# Patient Record
Sex: FEMALE | Race: WHITE | Employment: OTHER | URBAN - METROPOLITAN AREA
[De-identification: names, ages, dates, MRNs, and addresses within clinical notes are randomized per-mention and may not be internally consistent; named-entity substitution may affect disease eponyms.]

---

## 2017-01-05 PROBLEM — N28.9 RENAL INSUFFICIENCY: Status: ACTIVE | Noted: 2017-01-05

## 2017-01-07 PROBLEM — R10.9 ABDOMINAL PAIN: Status: ACTIVE | Noted: 2017-01-07

## 2018-04-25 ENCOUNTER — HOSPITAL ENCOUNTER (OUTPATIENT)
Dept: GENERAL RADIOLOGY | Age: 83
Discharge: HOME | End: 2018-04-27
Payer: MEDICARE

## 2018-04-25 ENCOUNTER — HOSPITAL ENCOUNTER (OUTPATIENT)
Age: 83
Discharge: HOME | End: 2018-04-27
Payer: MEDICARE

## 2018-04-25 DIAGNOSIS — M54.2 CERVICALGIA: ICD-10-CM

## 2018-04-25 DIAGNOSIS — M54.40 ACUTE RIGHT-SIDED LOW BACK PAIN WITH SCIATICA, SCIATICA LATERALITY UNSPECIFIED: ICD-10-CM

## 2018-04-25 PROCEDURE — 72110 X-RAY EXAM L-2 SPINE 4/>VWS: CPT

## 2018-04-25 PROCEDURE — 72072 X-RAY EXAM THORAC SPINE 3VWS: CPT

## 2019-03-22 ENCOUNTER — HOSPITAL ENCOUNTER (OUTPATIENT)
Dept: MRI IMAGING | Age: 84
Discharge: HOME | End: 2019-03-24
Payer: MEDICARE

## 2019-03-22 DIAGNOSIS — M54.6 PAIN IN THORACIC SPINE: ICD-10-CM

## 2019-03-22 DIAGNOSIS — M54.14 THORACIC RADICULOPATHY: ICD-10-CM

## 2019-03-22 DIAGNOSIS — M19.90 OSTEOARTHRITIS, UNSPECIFIED OSTEOARTHRITIS TYPE, UNSPECIFIED SITE: ICD-10-CM

## 2019-03-22 PROCEDURE — 72146 MRI CHEST SPINE W/O DYE: CPT

## 2019-04-05 ENCOUNTER — INITIAL CONSULT (OUTPATIENT)
Dept: NEUROSURGERY | Age: 84
End: 2019-04-05
Payer: MEDICARE

## 2019-04-05 VITALS
WEIGHT: 99 LBS | HEIGHT: 63 IN | BODY MASS INDEX: 17.54 KG/M2 | HEART RATE: 65 BPM | DIASTOLIC BLOOD PRESSURE: 75 MMHG | SYSTOLIC BLOOD PRESSURE: 136 MMHG

## 2019-04-05 DIAGNOSIS — G89.29 CHRONIC BILATERAL THORACIC BACK PAIN: ICD-10-CM

## 2019-04-05 DIAGNOSIS — M54.6 CHRONIC BILATERAL THORACIC BACK PAIN: ICD-10-CM

## 2019-04-05 PROCEDURE — 1036F TOBACCO NON-USER: CPT | Performed by: PHYSICIAN ASSISTANT

## 2019-04-05 PROCEDURE — 1090F PRES/ABSN URINE INCON ASSESS: CPT | Performed by: PHYSICIAN ASSISTANT

## 2019-04-05 PROCEDURE — 1123F ACP DISCUSS/DSCN MKR DOCD: CPT | Performed by: PHYSICIAN ASSISTANT

## 2019-04-05 PROCEDURE — G8427 DOCREV CUR MEDS BY ELIG CLIN: HCPCS | Performed by: PHYSICIAN ASSISTANT

## 2019-04-05 PROCEDURE — 4040F PNEUMOC VAC/ADMIN/RCVD: CPT | Performed by: PHYSICIAN ASSISTANT

## 2019-04-05 PROCEDURE — G8419 CALC BMI OUT NRM PARAM NOF/U: HCPCS | Performed by: PHYSICIAN ASSISTANT

## 2019-04-05 PROCEDURE — 99213 OFFICE O/P EST LOW 20 MIN: CPT | Performed by: PHYSICIAN ASSISTANT

## 2019-04-05 RX ORDER — FUROSEMIDE 20 MG/1
40 TABLET ORAL 2 TIMES DAILY
Status: ON HOLD | COMMUNITY
Start: 2019-02-08 | End: 2019-08-09 | Stop reason: SDUPTHER

## 2019-04-05 NOTE — PROGRESS NOTES
[address]  NEUROSURGERY     Patient: Rachel Granado  : 1928  MRN: [id number]    Date of Service: 2019    Reason for Referral: Back Pain    History of Present Illness: This is a 80year old white female who presents with chronic thoracic back pain. States the pain is tolerable, 5/10, and bilateral. Hx of T8 and T9 compression fx with kyphoplasty in  with Dr. Eloise Morales, admits to good relief. Only taking Tylenol intermittently for pain, states the pain subsided with rest. Denies new lower extremity pain, weakness, n/t. No loss of bowel or bladder function. Ambulating well with cane. No family present. Allergies:   Pcn [penicillins]    Past Medical History:      Diagnosis Date    Decreased ambulation status     uses cane    Gastric reflux     Hypertension     Left cataract 16    for removal    Parkinson disease ([facility] [address]     tremors of hands/arm / left leg    Vitamin D deficiency        Surgical History:      Procedure Laterality Date    CATARACT REMOVAL WITH IMPLANT Left 2016    HIP FRACTURE SURGERY Right 2015    ORIF left hip inter & subtrochanteric fx. w/long gamma nail. Lester Muniz MD    HYSTERECTOMY  ? robotic    KYPHOSIS SURGERY  2017    T8    KYPHOSIS SURGERY      TONSILLECTOMY      TOTAL KNEE ARTHROPLASTY Right 1997    Right TKA. Philip King. Jennifer Puente MD       Social History:   reports that she has never smoked. She has never used smokeless tobacco.   reports that she does not drink alcohol. Family History:  No family history on file.     Review of Systems:  Denies fever, chills, or night sweats  Denies headache, dizziness, syncope  Denies blurred vision, double vision  Denies chest pain, palpitations, SOB  Denies diarrhea, constipation, n/v  Denies dysuria, hematuria  Denies recent infections  Denies easy bruising  Denies anxiety, depression    Physical Exam:  WDWN, resting comfortable, no apparent distress  Appears stated age  Vitals stable  Non-labored breathing   A&O x 3, normal affect   Head is normocephalic, atraumatic   No palpable lymphadenopathy   Abdomen soft, nontender  Left pupil surgical   EOMI bilaterally  Cranial nerves II-XII intact bilaterally  No drift  4/5 in BUE, LUE tremor noted  4/5 in BLE  Sensation to LT intact x 4 ext  Toes going down  Skin warm and dry  -midline tenderness to palpation in thoracic or lumbar spine     Review of Imaging:  MRI Thoracic Spine   Impression   No evidence of new thoracic compression fracture. Prior kyphoplasties   as noted. Thyroid goiter. Severely dilated esophagus of indeterminate   etiology. Additional observations as outlined above. Assessment: Patient with chronic thoracic back pain, h/o T8 and T9 kyphoplasty. Stable. Plan:  -MRI reviewed, there is no surgical lesion.  No compression fx identified  -Pain control and expectations discussed   -Conservative tx; OTC medications, creams, ice/heat  -HHPT  -RTC PRN

## 2019-04-05 NOTE — PATIENT INSTRUCTIONS

## 2019-04-05 NOTE — LETTER
 KYPHOSIS SURGERY  01/11/2017    T8    KYPHOSIS SURGERY      TONSILLECTOMY      TOTAL KNEE ARTHROPLASTY Right 01/16/1997    Right TKA. Therman More. Aris Gosselin, MD       Social History:   reports that she has never smoked. She has never used smokeless tobacco.   reports that she does not drink alcohol. Family History:  No family history on file. Review of Systems:  Denies fever, chills, or night sweats  Denies headache, dizziness, syncope  Denies blurred vision, double vision  Denies chest pain, palpitations, SOB  Denies diarrhea, constipation, n/v  Denies dysuria, hematuria  Denies recent infections  Denies easy bruising  Denies anxiety, depression    Physical Exam:  WDWN, resting comfortable, no apparent distress  Appears stated age  Vitals stable  Non-labored breathing   A&O x 3, normal affect   Head is normocephalic, atraumatic   No palpable lymphadenopathy   Abdomen soft, nontender  Left pupil surgical   EOMI bilaterally  Cranial nerves II-XII intact bilaterally  No drift  4/5 in BUE, LUE tremor noted  4/5 in BLE  Sensation to LT intact x 4 ext  Toes going down  Skin warm and dry  -midline tenderness to palpation in thoracic or lumbar spine     Review of Imaging:  MRI Thoracic Spine   Impression   No evidence of new thoracic compression fracture. Prior kyphoplasties   as noted. Thyroid goiter. Severely dilated esophagus of indeterminate   etiology. Additional observations as outlined above. Assessment: Patient with chronic thoracic back pain, h/o T8 and T9 kyphoplasty. Stable. Plan:  -MRI reviewed, there is no surgical lesion. No compression fx identified  -Pain control and expectations discussed   -Conservative tx; OTC medications, creams, ice/heat  -HHPT  -RTC PRN                 If you have questions, please do not hesitate to call me. I look forward to following Balta Rasmussen along with you.     Sincerely,        NICOLE Camarena

## 2019-05-31 ENCOUNTER — HOSPITAL ENCOUNTER (EMERGENCY)
Age: 84
Discharge: HOME | End: 2019-05-31
Payer: MEDICARE

## 2019-05-31 ENCOUNTER — APPOINTMENT (OUTPATIENT)
Dept: CT IMAGING | Age: 84
End: 2019-05-31
Payer: MEDICARE

## 2019-05-31 VITALS
OXYGEN SATURATION: 93 % | DIASTOLIC BLOOD PRESSURE: 55 MMHG | HEIGHT: 64 IN | RESPIRATION RATE: 14 BRPM | HEART RATE: 84 BPM | SYSTOLIC BLOOD PRESSURE: 145 MMHG | TEMPERATURE: 97.8 F | WEIGHT: 100 LBS | BODY MASS INDEX: 17.07 KG/M2

## 2019-05-31 DIAGNOSIS — S39.012A LUMBOSACRAL STRAIN, INITIAL ENCOUNTER: Primary | ICD-10-CM

## 2019-05-31 DIAGNOSIS — S29.012A UPPER BACK STRAIN, INITIAL ENCOUNTER: ICD-10-CM

## 2019-05-31 PROCEDURE — 72131 CT LUMBAR SPINE W/O DYE: CPT

## 2019-05-31 PROCEDURE — 99283 EMERGENCY DEPT VISIT LOW MDM: CPT

## 2019-05-31 PROCEDURE — 6370000000 HC RX 637 (ALT 250 FOR IP): Performed by: NURSE PRACTITIONER

## 2019-05-31 PROCEDURE — 71250 CT THORAX DX C-: CPT

## 2019-05-31 RX ORDER — ACETAMINOPHEN 325 MG/1
650 TABLET ORAL ONCE
Status: COMPLETED | OUTPATIENT
Start: 2019-05-31 | End: 2019-05-31

## 2019-05-31 RX ORDER — ACETAMINOPHEN 500 MG
1000 TABLET ORAL EVERY 6 HOURS PRN
Qty: 20 TABLET | Refills: 0 | Status: ON HOLD | OUTPATIENT
Start: 2019-05-31 | End: 2019-08-09 | Stop reason: SDUPTHER

## 2019-05-31 RX ADMIN — ACETAMINOPHEN 650 MG: 325 TABLET ORAL at 13:32

## 2019-05-31 ASSESSMENT — PAIN DESCRIPTION - DESCRIPTORS: DESCRIPTORS: DISCOMFORT;CONSTANT

## 2019-05-31 ASSESSMENT — PAIN - FUNCTIONAL ASSESSMENT: PAIN_FUNCTIONAL_ASSESSMENT: PREVENTS OR INTERFERES WITH ALL ACTIVE AND SOME PASSIVE ACTIVITIES

## 2019-05-31 ASSESSMENT — PAIN SCALES - GENERAL
PAINLEVEL_OUTOF10: 7
PAINLEVEL_OUTOF10: 7

## 2019-05-31 ASSESSMENT — PAIN DESCRIPTION - ORIENTATION: ORIENTATION: UPPER

## 2019-05-31 ASSESSMENT — PAIN DESCRIPTION - FREQUENCY: FREQUENCY: CONTINUOUS

## 2019-05-31 ASSESSMENT — PAIN DESCRIPTION - PROGRESSION: CLINICAL_PROGRESSION: NOT CHANGED

## 2019-05-31 ASSESSMENT — PAIN DESCRIPTION - PAIN TYPE: TYPE: ACUTE PAIN

## 2019-05-31 ASSESSMENT — PAIN DESCRIPTION - LOCATION: LOCATION: BACK

## 2019-05-31 NOTE — ED NOTES
Bed:  [place] [address]  Expected date:   Expected time:   Means of arrival:   Comments:  [place]  05/31/19 10[facility]

## 2019-05-31 NOTE — ED PROVIDER NOTES
[facility]     Department of Emergency Medicine   ED  Provider Note  Admit Date/RoomTime: 5/31/2019 12:18 PM  ED Room: [place] [address]  Chief Complaint:   Back Pain (felt something \"pop\" in her upper back when she was inserting a screen in her window)    History of Present Illness   Source of history provided by:  patient. History/Exam Limitations: none. Rachel Granado is a 80 y.o. old female who has a past medical history of:   Past Medical History:   Diagnosis Date    Decreased ambulation status     uses cane    Gastric reflux     Hypertension     Left cataract 8-11-16    for removal    Parkinson disease ([facility] [address]     tremors of hands/arm / left leg    Vitamin D deficiency     presents to the emergency department by private vehicle, for acute, aching and cramping bilateral middle thoracic spine  And mid to lower lumbar pain without radiation, for several hour(s) prior to arrival.  The pain was caused by patient states that she was lifting a screen into a window when she felt a pop in her back. She has a history of recurrent self limited episodes of low back pain in the past, previous osteoarthritis of lumbar spine and previous herniated disc. Since onset the symptoms have been intermittent and mild in severity. There has been no bowel or bladder incontinence associated with the pain. There have been associated symptoms of nothing additional and denies any weakness, numbness, incontinence, dysuria, hematuria or abdominal pain. The the pain is aggraveated by any movement and relieved by nothing. Kathryn ODOM   Pertinent positives and negatives are stated within HPI, all other systems reviewed and are negative. Past Surgical History:   Procedure Laterality Date    CATARACT REMOVAL WITH IMPLANT Left 12/06/2016    HIP FRACTURE SURGERY Right 11/16/2015    ORIF left hip inter & subtrochanteric fx. w/long gamma nail. Lester Muniz MD    HYSTERECTOMY  2011?     robotic    KYPHOSIS SURGERY 01/11/2017    T8    KYPHOSIS SURGERY      TONSILLECTOMY      TOTAL KNEE ARTHROPLASTY Right 01/16/1997    Right TKA. Mikaelniranjan Jack. Vasile Lindsey MD   Social History:  reports that she has never smoked. She has never used smokeless tobacco. She reports that she does not drink alcohol or use drugs. Family History: family history is not on file. Allergies: Pcn [penicillins]    Physical Exam           ED Triage Vitals [05/31/19 1223]   BP Temp Temp Source Pulse Resp SpO2 Height Weight   (!) 145/55 97.8 °F (36.6 °C) Oral 84 14 93 % 5' 4\" (1.626 m) 100 lb (45.4 kg)      Oxygen Saturation Interpretation: Normal.    Constitutional:  Alert, development consistent with age. HEENT:  NC/NT. Airway patent. Neck:  Normal ROM. Supple. Respiratory:  Clear to auscultation and breath sounds equal.  CV:  Regular rate and rhythm, normal heart sounds, without pathological murmurs, ectopy, gallops, or rubs. GI:  Abdomen Soft, nontender, good bowel sounds. No firm or pulsatile mass. Back: middle and lower thoracic spine and lumbar spine bilateral.             Tenderness: Mild. Swelling: no.              Range of Motion: full range with pain. CVA Tenderness: No.            Straight leg raising:  Bilateral negative. Skin:  no erythema, rash or swelling noted. Distal Function:              Motor deficit: none. Sensory deficit: none. Pulse deficit: none. Calf Tenderness:  No Bilateral.               Edema:  none Both lower extremity(s). Reflexes: Bilateral knee,ankle,biceps normal.  Gait:  normal.  Integument:  Normal turgor. Warm, dry, without visible rash. Lymphatics: No lymphangitis or adenopathy noted. Neurological:  Oriented. Motor functions intact. Lab / Imaging Results   (All laboratory and radiology results have been personally reviewed by myself)  Labs:  No results found for this visit on 05/31/19. Imaging:   All Radiology results encounter      Plan   Discharge to home  Patient condition is good    New Medications     New Prescriptions    ACETAMINOPHEN (APAP EXTRA STRENGTH) 500 MG TABLET    Take 2 tablets by mouth every 6 hours as needed for Pain     Electronically signed by JERMAN Diaz CNP   DD: 5/31/19  **This report was transcribed using voice recognition software. Every effort was made to ensure accuracy; however, inadvertent computerized transcription errors may be present.   END OF ED PROVIDER NOTE      JERMAN Diaz CNP  05/31/19 5167

## 2019-05-31 NOTE — ED NOTES
RN applied ice to patients back where she is complaining of pain.      Sherren Levering, RN  05/31/19 5554

## 2019-06-06 ENCOUNTER — HOSPITAL ENCOUNTER (INPATIENT)
Age: 84
LOS: 5 days | Discharge: NURSING, REHAB OR LONG-TERM CARE | DRG: 478 | End: 2019-06-11
Attending: INTERNAL MEDICINE | Admitting: INTERNAL MEDICINE
Payer: MEDICARE

## 2019-06-06 ENCOUNTER — HOSPITAL ENCOUNTER (OUTPATIENT)
Age: 84
Discharge: HOME | End: 2019-06-06
Payer: MEDICARE

## 2019-06-06 ENCOUNTER — HOSPITAL ENCOUNTER (EMERGENCY)
Age: 84
Discharge: ANOTHER HOSPITAL | End: 2019-06-06
Attending: EMERGENCY MEDICINE
Payer: MEDICARE

## 2019-06-06 ENCOUNTER — APPOINTMENT (OUTPATIENT)
Dept: CT IMAGING | Age: 84
End: 2019-06-06
Payer: MEDICARE

## 2019-06-06 VITALS
HEIGHT: 64 IN | TEMPERATURE: 97.7 F | RESPIRATION RATE: 16 BRPM | BODY MASS INDEX: 17.07 KG/M2 | DIASTOLIC BLOOD PRESSURE: 87 MMHG | SYSTOLIC BLOOD PRESSURE: 171 MMHG | HEART RATE: 79 BPM | WEIGHT: 100 LBS | OXYGEN SATURATION: 96 %

## 2019-06-06 DIAGNOSIS — S22.000A THORACIC COMPRESSION FRACTURE, CLOSED, INITIAL ENCOUNTER (HCC): Primary | ICD-10-CM

## 2019-06-06 DIAGNOSIS — M54.5 LOW BACK PAIN, UNSPECIFIED BACK PAIN LATERALITY, UNSPECIFIED CHRONICITY, WITH SCIATICA PRESENCE UNSPECIFIED: ICD-10-CM

## 2019-06-06 DIAGNOSIS — S22.080A COMPRESSION FRACTURE OF T12 VERTEBRA (HCC): Primary | ICD-10-CM

## 2019-06-06 DIAGNOSIS — R26.2 UNABLE TO AMBULATE: ICD-10-CM

## 2019-06-06 LAB
ANION GAP SERPL CALCULATED.3IONS-SCNC: 12 MMOL/L (ref 7–16)
APTT: 24.2 SEC (ref 24.5–35.1)
BACTERIA: ABNORMAL /HPF
BASOPHILS ABSOLUTE: 0.02 E9/L (ref 0–0.2)
BASOPHILS RELATIVE PERCENT: 0.2 % (ref 0–2)
BILIRUBIN URINE: NEGATIVE
BLOOD, URINE: ABNORMAL
BUN BLDV-MCNC: 31 MG/DL (ref 8–23)
C-REACTIVE PROTEIN: 1.5 MG/DL (ref 0–0.4)
CALCIUM SERPL-MCNC: 9.5 MG/DL (ref 8.6–10.2)
CHLORIDE BLD-SCNC: 96 MMOL/L (ref 98–107)
CLARITY: CLEAR
CO2: 31 MMOL/L (ref 22–29)
COLOR: YELLOW
CREAT SERPL-MCNC: 0.9 MG/DL (ref 0.5–1)
EOSINOPHILS ABSOLUTE: 0 E9/L (ref 0.05–0.5)
EOSINOPHILS RELATIVE PERCENT: 0 % (ref 0–6)
EPITHELIAL CELLS, UA: ABNORMAL /HPF
GFR AFRICAN AMERICAN: >60
GFR NON-AFRICAN AMERICAN: 59 ML/MIN/1.73
GLUCOSE BLD-MCNC: 130 MG/DL (ref 74–99)
GLUCOSE URINE: NEGATIVE MG/DL
HCT VFR BLD CALC: 42.9 % (ref 34–48)
HEMOGLOBIN: 13.7 G/DL (ref 11.5–15.5)
IMMATURE GRANULOCYTES #: 0.1 E9/L
IMMATURE GRANULOCYTES %: 0.8 % (ref 0–5)
KETONES, URINE: ABNORMAL MG/DL
LACTIC ACID: 1.4 MMOL/L (ref 0.5–2.2)
LEUKOCYTE ESTERASE, URINE: NEGATIVE
LYMPHOCYTES ABSOLUTE: 0.59 E9/L (ref 1.5–4)
LYMPHOCYTES RELATIVE PERCENT: 4.5 % (ref 20–42)
MCH RBC QN AUTO: 30.6 PG (ref 26–35)
MCHC RBC AUTO-ENTMCNC: 31.9 % (ref 32–34.5)
MCV RBC AUTO: 96 FL (ref 80–99.9)
MONOCYTES ABSOLUTE: 0.39 E9/L (ref 0.1–0.95)
MONOCYTES RELATIVE PERCENT: 3 % (ref 2–12)
NEUTROPHILS ABSOLUTE: 11.96 E9/L (ref 1.8–7.3)
NEUTROPHILS RELATIVE PERCENT: 91.5 % (ref 43–80)
NITRITE, URINE: NEGATIVE
PDW BLD-RTO: 13.8 FL (ref 11.5–15)
PH UA: 5.5 (ref 5–9)
PLATELET # BLD: 201 E9/L (ref 130–450)
PMV BLD AUTO: 11.7 FL (ref 7–12)
POTASSIUM SERPL-SCNC: 4.1 MMOL/L (ref 3.5–5)
PROTEIN UA: ABNORMAL MG/DL
RBC # BLD: 4.47 E12/L (ref 3.5–5.5)
RBC # BLD: NORMAL 10*6/UL
RBC UA: ABNORMAL /HPF (ref 0–2)
SEDIMENTATION RATE, ERYTHROCYTE: 7 MM/HR (ref 0–20)
SODIUM BLD-SCNC: 139 MMOL/L (ref 132–146)
SPECIFIC GRAVITY UA: 1.01 (ref 1–1.03)
UROBILINOGEN, URINE: 0.2 E.U./DL
WBC # BLD: 13.1 E9/L (ref 4.5–11.5)
WBC UA: ABNORMAL /HPF (ref 0–5)

## 2019-06-06 PROCEDURE — 72128 CT CHEST SPINE W/O DYE: CPT

## 2019-06-06 PROCEDURE — 1200000000 HC SEMI PRIVATE

## 2019-06-06 PROCEDURE — 85025 COMPLETE CBC W/AUTO DIFF WBC: CPT

## 2019-06-06 PROCEDURE — 85730 THROMBOPLASTIN TIME PARTIAL: CPT

## 2019-06-06 PROCEDURE — 80048 BASIC METABOLIC PNL TOTAL CA: CPT

## 2019-06-06 PROCEDURE — A0425 GROUND MILEAGE: HCPCS

## 2019-06-06 PROCEDURE — 87040 BLOOD CULTURE FOR BACTERIA: CPT

## 2019-06-06 PROCEDURE — 81001 URINALYSIS AUTO W/SCOPE: CPT

## 2019-06-06 PROCEDURE — 83605 ASSAY OF LACTIC ACID: CPT

## 2019-06-06 PROCEDURE — 85651 RBC SED RATE NONAUTOMATED: CPT

## 2019-06-06 PROCEDURE — 99284 EMERGENCY DEPT VISIT MOD MDM: CPT

## 2019-06-06 PROCEDURE — 6370000000 HC RX 637 (ALT 250 FOR IP): Performed by: INTERNAL MEDICINE

## 2019-06-06 PROCEDURE — 86140 C-REACTIVE PROTEIN: CPT

## 2019-06-06 PROCEDURE — 6370000000 HC RX 637 (ALT 250 FOR IP): Performed by: NURSE PRACTITIONER

## 2019-06-06 PROCEDURE — A0428 BLS: HCPCS

## 2019-06-06 PROCEDURE — 2580000003 HC RX 258: Performed by: INTERNAL MEDICINE

## 2019-06-06 RX ORDER — GLYCERIN/PROPYLENE GLYCOL 0.6 %-0.6%
1 DROPPERETTE, SINGLE-USE DROP DISPENSER OPHTHALMIC (EYE) EVERY MORNING
COMMUNITY
End: 2020-09-21 | Stop reason: SDUPTHER

## 2019-06-06 RX ORDER — HYDROCODONE BITARTRATE AND ACETAMINOPHEN 5; 325 MG/1; MG/1
1 TABLET ORAL ONCE
Status: COMPLETED | OUTPATIENT
Start: 2019-06-06 | End: 2019-06-06

## 2019-06-06 RX ORDER — LACTOSE-REDUCED FOOD 0.06 G-1.5
1 LIQUID (ML) ORAL EVERY MORNING
Status: ON HOLD | COMMUNITY
End: 2019-06-11 | Stop reason: HOSPADM

## 2019-06-06 RX ORDER — GLYCERIN/PROPYLENE GLYCOL 0.6 %-0.6%
1 DROPPERETTE, SINGLE-USE DROP DISPENSER OPHTHALMIC (EYE) 3 TIMES DAILY
COMMUNITY

## 2019-06-06 RX ORDER — BISACODYL 10 MG
10 SUPPOSITORY, RECTAL RECTAL DAILY PRN
Status: DISCONTINUED | OUTPATIENT
Start: 2019-06-06 | End: 2019-06-11 | Stop reason: HOSPADM

## 2019-06-06 RX ORDER — ONDANSETRON 2 MG/ML
4 INJECTION INTRAMUSCULAR; INTRAVENOUS EVERY 6 HOURS PRN
Status: DISCONTINUED | OUTPATIENT
Start: 2019-06-06 | End: 2019-06-10 | Stop reason: SDUPTHER

## 2019-06-06 RX ORDER — LACTOSE-REDUCED FOOD 0.06 G-1.5
1 LIQUID (ML) ORAL EVERY MORNING
Status: DISCONTINUED | OUTPATIENT
Start: 2019-06-07 | End: 2019-06-06 | Stop reason: CLARIF

## 2019-06-06 RX ORDER — BISACODYL 10 MG
10 SUPPOSITORY, RECTAL RECTAL DAILY PRN
COMMUNITY
End: 2022-01-01

## 2019-06-06 RX ORDER — SODIUM CHLORIDE 0.9 % (FLUSH) 0.9 %
10 SYRINGE (ML) INJECTION EVERY 12 HOURS SCHEDULED
Status: DISCONTINUED | OUTPATIENT
Start: 2019-06-06 | End: 2019-06-10

## 2019-06-06 RX ORDER — ACETAMINOPHEN 500 MG
1000 TABLET ORAL EVERY 6 HOURS PRN
Status: DISCONTINUED | OUTPATIENT
Start: 2019-06-06 | End: 2019-06-11 | Stop reason: HOSPADM

## 2019-06-06 RX ORDER — FUROSEMIDE 40 MG/1
40 TABLET ORAL 2 TIMES DAILY
Status: DISCONTINUED | OUTPATIENT
Start: 2019-06-06 | End: 2019-06-11 | Stop reason: HOSPADM

## 2019-06-06 RX ORDER — SODIUM CHLORIDE 0.9 % (FLUSH) 0.9 %
10 SYRINGE (ML) INJECTION PRN
Status: DISCONTINUED | OUTPATIENT
Start: 2019-06-06 | End: 2019-06-10

## 2019-06-06 RX ORDER — ASPIRIN 81 MG/1
81 TABLET ORAL NIGHTLY
Status: DISCONTINUED | OUTPATIENT
Start: 2019-06-06 | End: 2019-06-09

## 2019-06-06 RX ORDER — POLYVINYL ALCOHOL 14 MG/ML
1 SOLUTION/ DROPS OPHTHALMIC EVERY MORNING
Status: DISCONTINUED | OUTPATIENT
Start: 2019-06-07 | End: 2019-06-07 | Stop reason: SDUPTHER

## 2019-06-06 RX ORDER — POLYVINYL ALCOHOL 14 MG/ML
1 SOLUTION/ DROPS OPHTHALMIC 3 TIMES DAILY
Status: DISCONTINUED | OUTPATIENT
Start: 2019-06-06 | End: 2019-06-11 | Stop reason: HOSPADM

## 2019-06-06 RX ADMIN — ASPIRIN 81 MG: 81 TABLET ORAL at 22:10

## 2019-06-06 RX ADMIN — ACETAMINOPHEN 1000 MG: 500 TABLET ORAL at 22:10

## 2019-06-06 RX ADMIN — CARBIDOPA AND LEVODOPA 1 TABLET: 25; 100 TABLET ORAL at 22:10

## 2019-06-06 RX ADMIN — POLYVINYL ALCOHOL 1 DROP: 14 SOLUTION/ DROPS OPHTHALMIC at 22:09

## 2019-06-06 RX ADMIN — HYDROCODONE BITARTRATE AND ACETAMINOPHEN 1 TABLET: 5; 325 TABLET ORAL at 16:40

## 2019-06-06 RX ADMIN — Medication 10 ML: at 22:13

## 2019-06-06 RX ADMIN — HYDROCODONE BITARTRATE AND ACETAMINOPHEN 1 TABLET: 5; 325 TABLET ORAL at 10:51

## 2019-06-06 ASSESSMENT — PAIN SCALES - GENERAL
PAINLEVEL_OUTOF10: 8
PAINLEVEL_OUTOF10: 6
PAINLEVEL_OUTOF10: 8
PAINLEVEL_OUTOF10: 7
PAINLEVEL_OUTOF10: 5
PAINLEVEL_OUTOF10: 8

## 2019-06-06 ASSESSMENT — PAIN DESCRIPTION - PAIN TYPE
TYPE: ACUTE PAIN
TYPE: CHRONIC PAIN
TYPE: ACUTE PAIN

## 2019-06-06 ASSESSMENT — PAIN DESCRIPTION - ORIENTATION: ORIENTATION: MID

## 2019-06-06 ASSESSMENT — PAIN DESCRIPTION - LOCATION
LOCATION: BACK

## 2019-06-06 ASSESSMENT — PAIN DESCRIPTION - DESCRIPTORS
DESCRIPTORS: CONSTANT
DESCRIPTORS: ACHING;DISCOMFORT
DESCRIPTORS: ACHING;BURNING;STABBING

## 2019-06-06 ASSESSMENT — PAIN DESCRIPTION - FREQUENCY: FREQUENCY: CONTINUOUS

## 2019-06-06 ASSESSMENT — PAIN - FUNCTIONAL ASSESSMENT: PAIN_FUNCTIONAL_ASSESSMENT: PREVENTS OR INTERFERES SOME ACTIVE ACTIVITIES AND ADLS

## 2019-06-06 ASSESSMENT — PAIN DESCRIPTION - PROGRESSION: CLINICAL_PROGRESSION: NOT CHANGED

## 2019-06-06 ASSESSMENT — PAIN DESCRIPTION - ONSET: ONSET: ON-GOING

## 2019-06-06 NOTE — ED PROVIDER NOTES
ATTENDING PROVIDER ATTESTATION:     Patel Berg presented to the emergency department for evaluation of Back Pain (mid back)    I have reviewed and discussed the case, including pertinent history (medical, surgical, family and social) and exam findings with the Midlevel and the Nurse assigned to Patel Berg. I have personally performed and/or participated in the history, exam, medical decision making, and procedures and agree with all pertinent clinical information. Please note the original note was cosigned in error by Dr. Radha Parker. I have reviewed my findings and recommendations with Patel Berg and members of family present at the time of disposition. My findings/plan: The primary encounter diagnosis was Compression fracture of T12 vertebra ([address] A diagnosis of Unable to ambulate was also pertinent to this visit. Discharge Medication List as of 6/6/2019  5:09 PM        Jose Marley MD      ED Attending  CC: Lisa         Department of Emergency Medicine   ED  Provider Note  Admit Date/RoomTime: 6/6/2019  9:44 AM  ED Room: 26/26  Chief Complaint:   Back Pain (mid back)    History of Present Illness   Source of history provided by:  patient. History/Exam Limitations: none. Patel Berg is a 80 y.o. old female who has a past medical history of:   Past Medical History:   Diagnosis Date    Decreased ambulation status     uses cane    Gastric reflux     Hypertension     Left cataract 8-11-16    for removal    Parkinson disease ([facility] [address]     tremors of hands/arm / left leg    Vitamin D deficiency     presents to the emergency department by ambulance where the patient received see Ambulance Run Sheet prior to arrival., for acute on chronic back pain and urinary frequency.  Patient reports she was in the ED last Friday after attempting to press a screen in her window she felt like something exploded in her back and had a CT of the back and states her previous kyphoplastys by Dr. Heydi Galdamez in 2017 were good and no fractures [facility] [address] [facility] [address]. She report she can not get out of bed and Dr. Hawa Zraate put her on a medrol dose pack and she still has 2 days left and has no improvement of pain. Since onset the symptoms have been constant and stable and moderate in severity. There has been no bowel or bladder incontinence associated with the pain. There have been associated symptoms of nothing additional and denies any weakness, numbness, incontinence, dysuria, hematuria, abdominal pain, fever, hx cancer, weight loss, tingling, morning stiffness, leg pain, leg weakness, abdominal swelling, chest pain, pelvic pain or perianal numbness. The the pain is aggraveated by any movement and relieved by nothing. ROS   Pertinent positives and negatives are stated within HPI, all other systems reviewed and are negative. Past Surgical History:   Procedure Laterality Date    CATARACT REMOVAL WITH IMPLANT Left 12/06/2016    HIP FRACTURE SURGERY Right 11/16/2015    ORIF left hip inter & subtrochanteric fx. w/long gamma nail. Anatoliy Gordillo MD    HYSTERECTOMY  2011? robotic    KYPHOSIS SURGERY  01/11/2017    T8    KYPHOSIS SURGERY      KYPHOSIS SURGERY N/A 6/10/2019    T11 VERTEBRAL BODY BIOPSY & KYPHOPLASTY performed by Yuan Das MD at [facility] Right 01/16/1997    Right TKA. Michael Rosario. Dallas Stokes MD   Social History:  reports that she has never smoked. She has never used smokeless tobacco. She reports that she does not drink alcohol or use drugs. Family History: family history is not on file. Allergies: Pcn [penicillins]    Physical Exam           ED Triage Vitals [06/06/19 0959]   BP Temp Temp Source Pulse Resp SpO2 Height Weight   (!) 168/79 97.7 °F (36.5 °C) Oral 85 18 96 % 5' 4\" (1.626 m) 100 lb (45.4 kg)      Oxygen Saturation Interpretation: Normal.    Constitutional:  Alert, development consistent with age.   HEENT: NC/NT. Airway patent. Neck:  Normal ROM. Supple. Respiratory:  Clear to auscultation and breath sounds equal.  CV:  Regular rate and rhythm, normal heart sounds, without pathological murmurs, ectopy, gallops, or rubs. GI:  Abdomen Soft, nontender, good bowel sounds. No firm or pulsatile mass. Back: middle thoracic spine bilateral.             Tenderness: Moderate to midline thoracic spine. Swelling: no.              Range of Motion: diminished range with pain. CVA Tenderness: No.            Straight leg raising:  Bilateral negative. Skin:  no erythema, rash or swelling noted. Distal Function:              Motor deficit: limited due to pain. Sensory deficit: none. Pulse deficit: none. Calf Tenderness:  No Bilateral.               Edema:  none Both lower extremity(s). Reflexes: Bilateral knee,ankle,biceps normal.  Gait:  On cart. Integument:  Normal turgor. Warm, dry, without visible rash. Lymphatics: No lymphangitis or adenopathy noted. Neurological:  Oriented. Motor functions intact.     Lab / Imaging Results   (All laboratory and radiology results have been personally reviewed by myself)  Labs:  Results for orders placed or performed during the hospital encounter of 06/06/19   Culture Blood #1   Result Value Ref Range    Blood Culture, Routine 5 Days- no growth    Culture Blood #2   Result Value Ref Range    Culture, Blood 2 5 Days- no growth    Urinalysis   Result Value Ref Range    Color, UA Yellow Straw/Yellow    Clarity, UA Clear Clear    Glucose, Ur Negative Negative mg/dL    Bilirubin Urine Negative Negative    Ketones, Urine TRACE (A) Negative mg/dL    Specific Gravity, UA 1.010 1.005 - 1.030    Blood, Urine TRACE-INTACT Negative    pH, UA 5.5 5.0 - 9.0    Protein, UA TRACE Negative mg/dL    Urobilinogen, Urine 0.2 <2.0 E.U./dL    Nitrite, Urine Negative Negative    Leukocyte Esterase, Urine Negative Negative Microscopic Urinalysis   Result Value Ref Range    WBC, UA 0-1 0 - 5 /HPF    RBC, UA 0-1 0 - 2 /HPF    Epi Cells RARE /HPF    Bacteria, UA RARE (A) /HPF   CBC Auto Differential   Result Value Ref Range    WBC 13.1 (H) 4.5 - 11.5 E9/L    RBC 4.47 3.50 - 5.50 E12/L    Hemoglobin 13.7 11.5 - 15.5 g/dL    Hematocrit 42.9 34.0 - 48.0 %    MCV 96.0 80.0 - 99.9 fL    MCH 30.6 26.0 - 35.0 pg    MCHC 31.9 (L) 32.0 - 34.5 %    RDW 13.8 11.5 - 15.0 fL    Platelets 715 411 - 689 E9/L    MPV 11.7 7.0 - 12.0 fL    Neutrophils % 91.5 (H) 43.0 - 80.0 %    Immature Granulocytes % 0.8 0.0 - 5.0 %    Lymphocytes % 4.5 (L) 20.0 - 42.0 %    Monocytes % 3.0 2.0 - 12.0 %    Eosinophils % 0.0 0.0 - 6.0 %    Basophils % 0.2 0.0 - 2.0 %    Neutrophils # 11.96 (H) 1.80 - 7.30 E9/L    Immature Granulocytes # 0.10 E9/L    Lymphocytes # 0.59 (L) 1.50 - 4.00 E9/L    Monocytes # 0.39 0.10 - 0.95 E9/L    Eosinophils # 0.00 (L) 0.05 - 0.50 E9/L    Basophils # 0.02 0.00 - 0.20 E9/L    RBC Morphology Normal    Sedimentation Rate   Result Value Ref Range    Sed Rate 7 0 - 20 mm/Hr   Basic Metabolic Panel   Result Value Ref Range    Sodium 139 132 - 146 mmol/L    Potassium 4.1 3.5 - 5.0 mmol/L    Chloride 96 (L) 98 - 107 mmol/L    CO2 31 (H) 22 - 29 mmol/L    Anion Gap 12 7 - 16 mmol/L    Glucose 130 (H) 74 - 99 mg/dL    BUN 31 (H) 8 - 23 mg/dL    CREATININE 0.9 0.5 - 1.0 mg/dL    GFR Non-African American 59 >=60 mL/min/1.73    GFR African American >60     Calcium 9.5 8.6 - 10.2 mg/dL   Lactic acid, plasma   Result Value Ref Range    Lactic Acid 1.4 0.5 - 2.2 mmol/L   C-Reactive Protein   Result Value Ref Range    CRP 1.5 (H) 0.0 - 0.4 mg/dL   APTT   Result Value Ref Range    aPTT 24.2 (L) 24.5 - 35.1 sec       Imaging: All Radiology results interpreted by Radiologist unless otherwise noted. CT Thoracic Spine WO Contrast   Final Result   ALERT:  THIS IS AN ABNORMAL REPORT   1.  Acute/subacute compression fracture at what is designated as the   T12 vertebral body, with approximately 40-50% loss of vertebral body   height, worsened when compared with previous study, with interval   development of air in the vertebral body at T12. Additionally, there   is air noted in the prevertebral soft tissues extending from the   approximate level of T10 vertebral body inferiorly to approximately   the level of the superior T12 vertebral body in the retrocrural   region. Findings are suspicious for possible infection given the lack   of nitrogen gas phenomenon within either the T11-T12 or T12-L1 disc   space. There is retropulsion of fracture fragments at the superior   posterior aspect of T12 of approximately 0.44 cm in the spinal canal.   2. Osteopenia with other stable changes throughout the thoracic spine   as described above, see above for details. 3. Abnormal extensive dilatation of the esophagus, tapering toward   more normal distal esophagus, findings are nonspecific, achalasia   could have the same appearance. An obstruction, esophageal well or   stricture distally to have the same appearance. Surgical   consultation/gastrointestinal consultation and evaluation recommended. 4. Retrosternal extension left thyroid gland incompletely   characterized, further evaluation with dedicated thyroid ultrasound is   recommended. ED Course / Medical Decision Making     Medications   HYDROcodone-acetaminophen (NORCO) 5-325 MG per tablet 1 tablet (1 tablet Oral Given 6/6/19 1051)   HYDROcodone-acetaminophen (NORCO) 5-325 MG per tablet 1 tablet (1 tablet Oral Given 6/6/19 1640)        Re-examination:  6/6/19       Time: 1300  Patients symptoms are improving. [address] Dr. Nunu De Luna into examine patient. [phone] Discussed transfer with patient and son. Consult(s):   IP CONSULT TO NEUROSURGERY  IP CONSULT TO PRIMARY CARE PROVIDER  1518 Discussed with Dr. Oliver Zuleta and will call Dr. Paulette Adamson.   [phone] Discussed with Dr. Paulette Adamson and will call pcp for admission to transfer to [facility]. 1545 Discussed case with Dr. Nia Angel and will admit to general medical surgical bed. Procedure(s):   none    Medical Decision Making:    Films were not obtained based on negative suspicion for bony injury as per history/physical findings and recent CT of spine. Will obtain a urinalysis and medicate with norco and re assess. She is afebrile; non toxic in appearance and hemodynamically stable. Her WBC's are slightly elevated and has been taking medrol dose pack for the past 3 days. She did get moderate relief of discomfort but still unable to ambulate due to pain. Case discussed with neurosurgeon and consult pcp for admission and they can consult Dr. Santhosh Huynh. Counseling: The emergency provider has spoken with the patient and son and discussed todays results, in addition to providing specific details for the plan of care and counseling regarding the diagnosis and prognosis. Questions are answered at this time and they are agreeable with the plan. Assessment      1. Compression fracture of T12 vertebra (HCC)    2. Unable to ambulate      Plan   Transfer to [address] [place] [address] for admission   Patient condition is stable    New Medications     Discharge Medication List as of 6/6/2019  5:09 PM        Electronically signed by Hawa Daley MD   DD: 6/6/19  **This report was transcribed using voice recognition software. Every effort was made to ensure accuracy; however, inadvertent computerized transcription errors may be present.   END OF ED PROVIDER NOTE      JERMAN Callaway - TAYLA  06/06/19 1546           Hawa Daley MD  06/25/19 1848

## 2019-06-07 ENCOUNTER — APPOINTMENT (OUTPATIENT)
Dept: MRI IMAGING | Age: 84
DRG: 478 | End: 2019-06-07
Attending: INTERNAL MEDICINE
Payer: MEDICARE

## 2019-06-07 ENCOUNTER — ANESTHESIA EVENT (OUTPATIENT)
Dept: OPERATING ROOM | Age: 84
DRG: 478 | End: 2019-06-07
Payer: MEDICARE

## 2019-06-07 LAB
ANION GAP SERPL CALCULATED.3IONS-SCNC: 14 MMOL/L (ref 7–16)
BASOPHILS ABSOLUTE: 0.02 E9/L (ref 0–0.2)
BASOPHILS RELATIVE PERCENT: 0.2 % (ref 0–2)
BUN BLDV-MCNC: 30 MG/DL (ref 8–23)
CALCIUM SERPL-MCNC: 9.2 MG/DL (ref 8.6–10.2)
CHLORIDE BLD-SCNC: 95 MMOL/L (ref 98–107)
CO2: 29 MMOL/L (ref 22–29)
CREAT SERPL-MCNC: 1 MG/DL (ref 0.5–1)
EOSINOPHILS ABSOLUTE: 0.14 E9/L (ref 0.05–0.5)
EOSINOPHILS RELATIVE PERCENT: 1.5 % (ref 0–6)
GFR AFRICAN AMERICAN: >60
GFR NON-AFRICAN AMERICAN: 52 ML/MIN/1.73
GLUCOSE BLD-MCNC: 96 MG/DL (ref 74–99)
HCT VFR BLD CALC: 42.3 % (ref 34–48)
HEMOGLOBIN: 13.8 G/DL (ref 11.5–15.5)
IMMATURE GRANULOCYTES #: 0.09 E9/L
IMMATURE GRANULOCYTES %: 1 % (ref 0–5)
LYMPHOCYTES ABSOLUTE: 1.58 E9/L (ref 1.5–4)
LYMPHOCYTES RELATIVE PERCENT: 17.2 % (ref 20–42)
MCH RBC QN AUTO: 30.7 PG (ref 26–35)
MCHC RBC AUTO-ENTMCNC: 32.6 % (ref 32–34.5)
MCV RBC AUTO: 94.2 FL (ref 80–99.9)
MONOCYTES ABSOLUTE: 0.68 E9/L (ref 0.1–0.95)
MONOCYTES RELATIVE PERCENT: 7.4 % (ref 2–12)
NEUTROPHILS ABSOLUTE: 6.68 E9/L (ref 1.8–7.3)
NEUTROPHILS RELATIVE PERCENT: 72.7 % (ref 43–80)
PDW BLD-RTO: 13.7 FL (ref 11.5–15)
PLATELET # BLD: 209 E9/L (ref 130–450)
PMV BLD AUTO: 11.9 FL (ref 7–12)
POTASSIUM REFLEX MAGNESIUM: 3.8 MMOL/L (ref 3.5–5)
RBC # BLD: 4.49 E12/L (ref 3.5–5.5)
SODIUM BLD-SCNC: 138 MMOL/L (ref 132–146)
WBC # BLD: 9.2 E9/L (ref 4.5–11.5)

## 2019-06-07 PROCEDURE — 36415 COLL VENOUS BLD VENIPUNCTURE: CPT

## 2019-06-07 PROCEDURE — 6370000000 HC RX 637 (ALT 250 FOR IP): Performed by: INTERNAL MEDICINE

## 2019-06-07 PROCEDURE — 6370000000 HC RX 637 (ALT 250 FOR IP): Performed by: PHYSICIAN ASSISTANT

## 2019-06-07 PROCEDURE — 2580000003 HC RX 258: Performed by: INTERNAL MEDICINE

## 2019-06-07 PROCEDURE — A9579 GAD-BASE MR CONTRAST NOS,1ML: HCPCS | Performed by: RADIOLOGY

## 2019-06-07 PROCEDURE — 6360000002 HC RX W HCPCS: Performed by: INTERNAL MEDICINE

## 2019-06-07 PROCEDURE — 99222 1ST HOSP IP/OBS MODERATE 55: CPT | Performed by: NEUROLOGICAL SURGERY

## 2019-06-07 PROCEDURE — 72157 MRI CHEST SPINE W/O & W/DYE: CPT

## 2019-06-07 PROCEDURE — 72158 MRI LUMBAR SPINE W/O & W/DYE: CPT

## 2019-06-07 PROCEDURE — 85025 COMPLETE CBC W/AUTO DIFF WBC: CPT

## 2019-06-07 PROCEDURE — 6360000004 HC RX CONTRAST MEDICATION: Performed by: RADIOLOGY

## 2019-06-07 PROCEDURE — 1200000000 HC SEMI PRIVATE

## 2019-06-07 PROCEDURE — 80048 BASIC METABOLIC PNL TOTAL CA: CPT

## 2019-06-07 RX ORDER — HYDRALAZINE HYDROCHLORIDE 20 MG/ML
5 INJECTION INTRAMUSCULAR; INTRAVENOUS EVERY 6 HOURS PRN
Status: DISCONTINUED | OUTPATIENT
Start: 2019-06-07 | End: 2019-06-11 | Stop reason: HOSPADM

## 2019-06-07 RX ORDER — OXYCODONE HYDROCHLORIDE AND ACETAMINOPHEN 5; 325 MG/1; MG/1
1 TABLET ORAL EVERY 4 HOURS PRN
Status: DISCONTINUED | OUTPATIENT
Start: 2019-06-07 | End: 2019-06-11 | Stop reason: HOSPADM

## 2019-06-07 RX ADMIN — POLYVINYL ALCOHOL 1 DROP: 14 SOLUTION/ DROPS OPHTHALMIC at 10:10

## 2019-06-07 RX ADMIN — VITAMIN D, TAB 1000IU (100/BT) 1000 UNITS: 25 TAB at 10:06

## 2019-06-07 RX ADMIN — CARBIDOPA AND LEVODOPA 1 TABLET: 25; 100 TABLET ORAL at 10:06

## 2019-06-07 RX ADMIN — OXYCODONE HYDROCHLORIDE AND ACETAMINOPHEN 1 TABLET: 5; 325 TABLET ORAL at 15:06

## 2019-06-07 RX ADMIN — OXYCODONE HYDROCHLORIDE AND ACETAMINOPHEN 1 TABLET: 5; 325 TABLET ORAL at 19:59

## 2019-06-07 RX ADMIN — OXYCODONE HYDROCHLORIDE AND ACETAMINOPHEN 1 TABLET: 5; 325 TABLET ORAL at 10:06

## 2019-06-07 RX ADMIN — GADOTERIDOL 9 ML: 279.3 INJECTION, SOLUTION INTRAVENOUS at 21:40

## 2019-06-07 RX ADMIN — ACETAMINOPHEN 1000 MG: 500 TABLET ORAL at 05:05

## 2019-06-07 RX ADMIN — ENOXAPARIN SODIUM 30 MG: 30 INJECTION SUBCUTANEOUS at 10:07

## 2019-06-07 RX ADMIN — Medication 10 ML: at 21:50

## 2019-06-07 RX ADMIN — Medication 10 ML: at 10:07

## 2019-06-07 RX ADMIN — ASPIRIN 81 MG: 81 TABLET ORAL at 21:50

## 2019-06-07 RX ADMIN — POLYVINYL ALCOHOL 1 DROP: 14 SOLUTION/ DROPS OPHTHALMIC at 21:53

## 2019-06-07 RX ADMIN — CARBIDOPA AND LEVODOPA 1 TABLET: 25; 100 TABLET ORAL at 21:50

## 2019-06-07 RX ADMIN — POLYVINYL ALCOHOL 1 DROP: 14 SOLUTION/ DROPS OPHTHALMIC at 14:08

## 2019-06-07 ASSESSMENT — PAIN DESCRIPTION - PAIN TYPE
TYPE: ACUTE PAIN

## 2019-06-07 ASSESSMENT — PAIN DESCRIPTION - PROGRESSION
CLINICAL_PROGRESSION: NOT CHANGED
CLINICAL_PROGRESSION: NOT CHANGED

## 2019-06-07 ASSESSMENT — PAIN DESCRIPTION - ORIENTATION
ORIENTATION: MID
ORIENTATION: MID

## 2019-06-07 ASSESSMENT — PAIN DESCRIPTION - FREQUENCY
FREQUENCY: CONTINUOUS
FREQUENCY: CONTINUOUS

## 2019-06-07 ASSESSMENT — PAIN DESCRIPTION - DESCRIPTORS
DESCRIPTORS: DISCOMFORT;NAGGING;STABBING
DESCRIPTORS: ACHING;DISCOMFORT
DESCRIPTORS: ACHING;DISCOMFORT
DESCRIPTORS: DISCOMFORT;NAGGING;STABBING

## 2019-06-07 ASSESSMENT — PAIN DESCRIPTION - LOCATION
LOCATION: BACK

## 2019-06-07 ASSESSMENT — PAIN SCALES - GENERAL
PAINLEVEL_OUTOF10: 4
PAINLEVEL_OUTOF10: 8
PAINLEVEL_OUTOF10: 6
PAINLEVEL_OUTOF10: 8
PAINLEVEL_OUTOF10: 6
PAINLEVEL_OUTOF10: 4
PAINLEVEL_OUTOF10: 8

## 2019-06-07 ASSESSMENT — PAIN DESCRIPTION - ONSET
ONSET: ON-GOING
ONSET: ON-GOING

## 2019-06-07 NOTE — PLAN OF CARE
Problem: Falls - Risk of:  Goal: Will remain free from falls  Description  Will remain free from falls  6/7/2019 1556 by Mounika Matthews RN  Outcome: Met This Shift  6/7/2019 1022 by Mounika Matthews RN  Outcome: Ongoing  Goal: Absence of physical injury  Description  Absence of physical injury  6/7/2019 1556 by Mounika Matthews RN  Outcome: Met This Shift  6/7/2019 1022 by Mounika Matthews RN  Outcome: Ongoing     Problem: Risk for Impaired Skin Integrity  Goal: Tissue integrity - skin and mucous membranes  Description  Structural intactness and normal physiological function of skin and  mucous membranes.   6/7/2019 1556 by Mounika Matthews RN  Outcome: Met This Shift  6/7/2019 1022 by Mounika Matthews RN  Outcome: Ongoing     Problem: Pain:  Goal: Pain level will decrease  Description  Pain level will decrease  6/7/2019 1556 by Mounika Matthews RN  Outcome: Ongoing  6/7/2019 1022 by Mounika Matthews RN  Outcome: Ongoing  Goal: Control of acute pain  Description  Control of acute pain  6/7/2019 1556 by Mounika Matthews RN  Outcome: Ongoing  6/7/2019 1022 by Mounika Matthews RN  Outcome: Ongoing  Goal: Control of chronic pain  Description  Control of chronic pain  Outcome: Ongoing

## 2019-06-07 NOTE — ANESTHESIA PRE PROCEDURE
Department of Anesthesiology  Preprocedure Note       Name:  Diony Lao   Age:  80 y.o.  :  1928                                          MRN:  [id number]         Date:  2019      Surgeon: Will Lomeli):  Spenser Christine MD    Procedure: T11 VERTEBRAL BODY BIOPSY & KYPHOPLASTY (N/A )    Medications prior to admission:   Prior to Admission medications    Medication Sig Start Date End Date Taking?  Authorizing Provider   Nutritional Supplements (BOOST PLUS) LIQD Take 1 Can by mouth every morning   Yes Historical Provider, MD   bisacodyl (DULCOLAX) 10 MG suppository Place 10 mg rectally daily as needed for Constipation   Yes Historical Provider, MD   Artificial Tear Solution (SOOTHE XP) SOLN Place 1 drop into both eyes every morning   Yes Historical Provider, MD   Artificial Tear Solution (SOOTHE XP) SOLN Place 1 drop into the left eye 3 times daily   Yes Historical Provider, MD   acetaminophen (APAP EXTRA STRENGTH) 500 MG tablet Take 2 tablets by mouth every 6 hours as needed for Pain 19  Yes JERMAN Gonzalez - CNP   furosemide (LASIX) 20 MG tablet Take 40 mg by mouth 2 times daily  19  Yes Historical Provider, MD   aspirin 81 MG tablet Take 81 mg by mouth nightly    Yes Historical Provider, MD   Cholecalciferol (VITAMIN D3) 1000 UNITS TABS Take 1 tablet by mouth every morning    Yes Historical Provider, MD   carbidopa-levodopa (SINEMET)  MG per tablet Take 1 tablet by mouth 2 times daily    Yes Historical Provider, MD       Current medications:    Current Facility-Administered Medications   Medication Dose Route Frequency Provider Last Rate Last Dose    oxyCODONE-acetaminophen (PERCOCET) 5-325 MG per tablet 1 tablet  1 tablet Oral Q4H PRN NICOLE Alcantar   1 tablet at 19 1506    HYDROmorphone (DILAUDID) injection 0.5 mg  0.5 mg Intravenous Q4H PRN NICOLE Alcantar        enoxaparin (LOVENOX) injection 30 mg  30 mg Subcutaneous Daily Claudean Bread, DO   30 mg at 19 1007    hydrALAZINE (APRESOLINE) injection 5 mg  5 mg Intravenous Q6H PRN Shahana Morales MD        carbidopa-levodopa (SINEMET)  MG per tablet 1 tablet  1 tablet Oral BID Katharina Nguyễn, DO   1 tablet at 06/07/19 1006    vitamin D (CHOLECALCIFEROL) tablet 1,000 Units  1 tablet Oral QAM Katharina Nguyễn, DO   1,000 Units at 06/07/19 1006    aspirin EC tablet 81 mg  81 mg Oral Nightly Katharina Nguyễn, DO   81 mg at 06/06/19 2210    furosemide (LASIX) tablet 40 mg  40 mg Oral BID Katharina Nguyễn, DO        acetaminophen (TYLENOL) tablet 1,000 mg  1,000 mg Oral Q6H PRN Katharina Nguyễn, DO   1,000 mg at 06/07/19 0505    bisacodyl (DULCOLAX) suppository 10 mg  10 mg Rectal Daily PRN Katharina Nguyễn, DO        polyvinyl alcohol (LIQUIFILM TEARS) 1.4 % ophthalmic solution 1 drop  1 drop Left Eye TID Katharina Nguyễn, DO   1 drop at 06/07/19 1408    sodium chloride flush 0.9 % injection 10 mL  10 mL Intravenous 2 times per day Katharina Nguyễn, DO   10 mL at 06/07/19 1007    sodium chloride flush 0.9 % injection 10 mL  10 mL Intravenous PRN Katharina Nguyễn, DO        magnesium hydroxide (MILK OF MAGNESIA) 400 MG/5ML suspension 30 mL  30 mL Oral Daily PRN Katharina Nguyễn, DO        ondansetron [facility]) injection 4 mg  4 mg Intravenous Q6H PRN Katharina Nguyễn, DO           Allergies:     Allergies   Allergen Reactions    Pcn [Penicillins] Itching       Problem List:    Patient Active Problem List   Diagnosis Code    Subtrochanteric fracture of right femur ([facility] S72.21XA    Parkinson's disease ([facility] G20    Renal insufficiency N28.9    Hypertension I10    Abdominal pain R10.9    Chronic bilateral thoracic back pain M54.6, G89.29    Thoracic compression fracture, closed, initial encounter ([facility] [address] S22.000A       Past Medical History:        Diagnosis Date    Decreased ambulation status     uses cane    Gastric reflux     Hypertension     Left cataract 8-11-16    for removal    [facility]     tremors of hands/arm / left leg    Vitamin D deficiency        Past Surgical History:        Procedure Laterality Date    CATARACT REMOVAL WITH IMPLANT Left 12/06/2016    HIP FRACTURE SURGERY Right 11/16/2015    ORIF left hip inter & subtrochanteric fx. w/long gamma nail. Anatoliy Gordillo MD    HYSTERECTOMY  2011? robotic    KYPHOSIS SURGERY  01/11/2017    T8    KYPHOSIS SURGERY      TONSILLECTOMY      TOTAL KNEE ARTHROPLASTY Right 01/16/1997    Right TKA. Michael Rosario. Dallas Stokes MD       Social History:    Social History     Tobacco Use    Smoking status: Never Smoker    Smokeless tobacco: Never Used   Substance Use Topics    Alcohol use: No                                Counseling given: Not Answered      Vital Signs (Current):   Vitals:    06/07/19 0730 06/07/19 1130 06/07/19 1218 06/07/19 1506   BP: (!) 187/91 (!) 140/58 (!) 154/71 (!) 132/96   Pulse: 78  81 120   Resp: 18  16 18   Temp: 36.4 °C (97.5 °F)  36.4 °C (97.5 °F) 36.9 °C (98.4 °F)   TempSrc: Temporal   Temporal   SpO2: 94%   98%   Weight:       Height:                                                  BP Readings from Last 3 Encounters:   06/07/19 (!) 132/96   06/06/19 (!) 171/87   05/31/19 (!) 145/55       NPO Status:  Pt instructed to be NPO at midnight on 06/10/2019 and she verbalized understanding. BMI:   Wt Readings from Last 3 Encounters:   06/07/19 93 lb 9.6 oz (42.5 kg)   06/06/19 100 lb (45.4 kg)   05/31/19 100 lb (45.4 kg)     Body mass index is 17.12 kg/m².     CBC:   Lab Results   Component Value Date    WBC 9.2 06/07/2019    RBC 4.49 06/07/2019    HGB 13.8 06/07/2019    HCT 42.3 06/07/2019    MCV 94.2 06/07/2019    RDW 13.7 06/07/2019     06/07/2019       CMP:   Lab Results   Component Value Date     06/07/2019    K 3.8 06/07/2019    CL 95 06/07/2019    CO2 29 06/07/2019    BUN 30 06/07/2019    CREATININE 1.0 06/07/2019    GFRAA >60 06/07/2019 LABGLOM 52 06/07/2019    GLUCOSE 96 06/07/2019    PROT 6.2 01/08/2017    CALCIUM 9.2 06/07/2019    BILITOT 0.7 01/08/2017    ALKPHOS 74 01/08/2017    AST 44 01/08/2017    ALT <5 01/08/2017       POC Tests: No results for input(s): POCGLU, POCNA, POCK, POCCL, POCBUN, POCHEMO, POCHCT in the last 72 hours. Coags:   Lab Results   Component Value Date    PROTIME 10.4 01/11/2017    INR 0.9 01/11/2017    APTT 24.2 06/06/2019       HCG (If Applicable): No results found for: PREGTESTUR, PREGSERUM, HCG, HCGQUANT     ABGs: No results found for: PHART, PO2ART, AUW0LHH, FSK7TTV, BEART, X6CGBVXD     Type & Screen (If Applicable):  No results found for: LABABO, LABRH    EKG 01/04/2017    Normal sinus rhythm  Possible Left atrial enlargement  Left axis deviation  Left ventricular hypertrophy  Abnormal ECG  No previous ECGs available    CT Scan of Thoracic Spine 06/06/2019    Impression   ALERT:  THIS IS AN ABNORMAL REPORT   1. Acute/subacute compression fracture at what is designated as the   T12 vertebral body, with approximately 40-50% loss of vertebral body   height, worsened when compared with previous study, with interval   development of air in the vertebral body at T12. Additionally, there   is air noted in the prevertebral soft tissues extending from the   approximate level of T10 vertebral body inferiorly to approximately   the level of the superior T12 vertebral body in the retrocrural   region. Findings are suspicious for possible infection given the lack   of nitrogen gas phenomenon within either the T11-T12 or T12-L1 disc   space. There is retropulsion of fracture fragments at the superior   posterior aspect of T12 of approximately 0.44 cm in the spinal canal.   2. Osteopenia with other stable changes throughout the thoracic spine   as described above, see above for details.    3. Abnormal extensive dilatation of the esophagus, tapering toward   more normal distal esophagus, findings are nonspecific, achalasia could have the same appearance. An obstruction, esophageal well or   stricture distally to have the same appearance. Surgical   consultation/gastrointestinal consultation and evaluation recommended. 4. Retrosternal extension left thyroid gland incompletely   characterized, further evaluation with dedicated thyroid ultrasound is   recommended.                                       Anesthesia Evaluation  Patient summary reviewed and Nursing notes reviewed no history of anesthetic complications:   Airway: Mallampati: III  TM distance: <3 FB   Neck ROM: full  Mouth opening: < 3 FB Dental:    (+) upper dentures and lower dentures      Pulmonary:Negative Pulmonary ROS and normal exam                               Cardiovascular:    (+) hypertension:,       ECG reviewed  Rhythm: regular  Rate: normal           Beta Blocker:  Not on Beta Blocker         Neuro/Psych:   (+) neuromuscular disease: Parkinson's disease,              ROS comment: Hx: Chronic bilateral thoracic back pain, tremors to BUEs GI/Hepatic/Renal:   (+) GERD: well controlled, renal disease: CRI,           Endo/Other:                      ROS comment: Hx: Vitamin D deficiency, enlarged thyroid Abdominal:           Vascular: negative vascular ROS. Anesthesia Plan      general and MAC     ASA 4       Induction: intravenous. BIS  MIPS: Postoperative opioids intended and Prophylactic antiemetics administered. Anesthetic plan and risks discussed with patient. Use of blood products discussed with patient whom consented to blood products. Plan discussed with CRNA and attending. Justino Menon RN   6/7/2019      DOS STAFF ADDENDUM:    Patient seen and chart reviewed. Physical exam and history updated as indicated. NPO status confirmed. Anesthesia options and plan discussed including risks benefits with patient/legal guardian and family as available. Concerns and questions addressed.   Consent verbalized to proceed.   Anesthesia plan, options and intraoperative/postoperative concerns discussed with care team.    Hoang Dumont MD  6/10/2019  10:28 AM

## 2019-06-07 NOTE — H&P
Milla Lainez M.D. History and Physical      CHIEF COMPLAINT:  Back pain      Reason for Admission:  Compression fracture thoracic spine    History Obtained From:  patient, electronic medical record    HISTORY OF PRESENT ILLNESS:      The patient is a 80 y.o. female of Rose Doshi MD with significant past medical history of HTN, renal insufficiency, enlarged thyroid, previous compression fractures, parkinson's disease who presents with mid- back pain that started one week ago when the patient was reaching up and pushing a window screen into place. She felt \"an explosion in my back\". She saw her PCP and was given a medrol dose pack without improvement in her condition and then presented to the ER for care. Denies associated symptoms. Patient sees Dr Sabine Vaughn. 06/06/19 1815  97.4 (36.3)  14  73  136/73  --  --  --  --  97  None (Room air)  6  100 lb (45.4 kg)  18.3     Labs: unremarkable    Radiology: CT thoracic spine: acute/subacute compression fracture of T12 vertebral body, air in the vertebral body at T12, osteopenia      Past Medical History:        Diagnosis Date    Decreased ambulation status     uses cane    Gastric reflux     Hypertension     Left cataract 8-11-16    for removal    Parkinson disease ([address]     tremors of hands/arm / left leg    Vitamin D deficiency      Past Surgical History:        Procedure Laterality Date    CATARACT REMOVAL WITH IMPLANT Left 12/06/2016    HIP FRACTURE SURGERY Right 11/16/2015    ORIF left hip inter & subtrochanteric fx. w/long gamma nail. Nathaly Greenwood MD    HYSTERECTOMY  2011? robotic    KYPHOSIS SURGERY  01/11/2017    T8    KYPHOSIS SURGERY      TONSILLECTOMY      TOTAL KNEE ARTHROPLASTY Right 01/16/1997    Right TKA. Kilo Patterson.  Virginie Hobbs MD         Medications Prior to Admission:    Medications Prior to Admission: Nutritional Supplements (BOOST PLUS) LIQD, Take 1 Can by mouth every morning  bisacodyl

## 2019-06-08 LAB
ANION GAP SERPL CALCULATED.3IONS-SCNC: 10 MMOL/L (ref 7–16)
BASOPHILS ABSOLUTE: 0.02 E9/L (ref 0–0.2)
BASOPHILS RELATIVE PERCENT: 0.2 % (ref 0–2)
BUN BLDV-MCNC: 28 MG/DL (ref 8–23)
CALCIUM SERPL-MCNC: 9.2 MG/DL (ref 8.6–10.2)
CHLORIDE BLD-SCNC: 98 MMOL/L (ref 98–107)
CO2: 26 MMOL/L (ref 22–29)
CREAT SERPL-MCNC: 1 MG/DL (ref 0.5–1)
EOSINOPHILS ABSOLUTE: 0.2 E9/L (ref 0.05–0.5)
EOSINOPHILS RELATIVE PERCENT: 2 % (ref 0–6)
GFR AFRICAN AMERICAN: >60
GFR NON-AFRICAN AMERICAN: 52 ML/MIN/1.73
GLUCOSE BLD-MCNC: 73 MG/DL (ref 74–99)
HCT VFR BLD CALC: 39.6 % (ref 34–48)
HEMOGLOBIN: 12.8 G/DL (ref 11.5–15.5)
IMMATURE GRANULOCYTES #: 0.12 E9/L
IMMATURE GRANULOCYTES %: 1.2 % (ref 0–5)
INR BLD: 0.9
LYMPHOCYTES ABSOLUTE: 1.63 E9/L (ref 1.5–4)
LYMPHOCYTES RELATIVE PERCENT: 16.2 % (ref 20–42)
MCH RBC QN AUTO: 31.4 PG (ref 26–35)
MCHC RBC AUTO-ENTMCNC: 32.3 % (ref 32–34.5)
MCV RBC AUTO: 97.1 FL (ref 80–99.9)
MONOCYTES ABSOLUTE: 0.59 E9/L (ref 0.1–0.95)
MONOCYTES RELATIVE PERCENT: 5.8 % (ref 2–12)
NEUTROPHILS ABSOLUTE: 7.53 E9/L (ref 1.8–7.3)
NEUTROPHILS RELATIVE PERCENT: 74.6 % (ref 43–80)
PDW BLD-RTO: 14 FL (ref 11.5–15)
PLATELET # BLD: 223 E9/L (ref 130–450)
PMV BLD AUTO: 11.7 FL (ref 7–12)
POTASSIUM SERPL-SCNC: 5 MMOL/L (ref 3.5–5)
PROTHROMBIN TIME: 10.6 SEC (ref 9.3–12.4)
RBC # BLD: 4.08 E12/L (ref 3.5–5.5)
SODIUM BLD-SCNC: 134 MMOL/L (ref 132–146)
WBC # BLD: 10.1 E9/L (ref 4.5–11.5)

## 2019-06-08 PROCEDURE — 36415 COLL VENOUS BLD VENIPUNCTURE: CPT

## 2019-06-08 PROCEDURE — 85025 COMPLETE CBC W/AUTO DIFF WBC: CPT

## 2019-06-08 PROCEDURE — 1200000000 HC SEMI PRIVATE

## 2019-06-08 PROCEDURE — 6370000000 HC RX 637 (ALT 250 FOR IP): Performed by: INTERNAL MEDICINE

## 2019-06-08 PROCEDURE — 2580000003 HC RX 258: Performed by: INTERNAL MEDICINE

## 2019-06-08 PROCEDURE — 93005 ELECTROCARDIOGRAM TRACING: CPT

## 2019-06-08 PROCEDURE — 6370000000 HC RX 637 (ALT 250 FOR IP): Performed by: PHYSICIAN ASSISTANT

## 2019-06-08 PROCEDURE — 6360000002 HC RX W HCPCS: Performed by: INTERNAL MEDICINE

## 2019-06-08 PROCEDURE — 80048 BASIC METABOLIC PNL TOTAL CA: CPT

## 2019-06-08 PROCEDURE — 99232 SBSQ HOSP IP/OBS MODERATE 35: CPT | Performed by: NEUROLOGICAL SURGERY

## 2019-06-08 PROCEDURE — 85610 PROTHROMBIN TIME: CPT

## 2019-06-08 RX ADMIN — FUROSEMIDE 40 MG: 40 TABLET ORAL at 20:45

## 2019-06-08 RX ADMIN — CARBIDOPA AND LEVODOPA 1 TABLET: 25; 100 TABLET ORAL at 09:20

## 2019-06-08 RX ADMIN — CARBIDOPA AND LEVODOPA 1 TABLET: 25; 100 TABLET ORAL at 20:45

## 2019-06-08 RX ADMIN — Medication 10 ML: at 09:19

## 2019-06-08 RX ADMIN — POLYVINYL ALCOHOL 1 DROP: 14 SOLUTION/ DROPS OPHTHALMIC at 20:46

## 2019-06-08 RX ADMIN — ASPIRIN 81 MG: 81 TABLET ORAL at 20:45

## 2019-06-08 RX ADMIN — OXYCODONE HYDROCHLORIDE AND ACETAMINOPHEN 1 TABLET: 5; 325 TABLET ORAL at 09:14

## 2019-06-08 RX ADMIN — OXYCODONE HYDROCHLORIDE AND ACETAMINOPHEN 1 TABLET: 5; 325 TABLET ORAL at 14:06

## 2019-06-08 RX ADMIN — VITAMIN D, TAB 1000IU (100/BT) 1000 UNITS: 25 TAB at 09:19

## 2019-06-08 RX ADMIN — OXYCODONE HYDROCHLORIDE AND ACETAMINOPHEN 1 TABLET: 5; 325 TABLET ORAL at 18:13

## 2019-06-08 RX ADMIN — Medication 10 ML: at 20:46

## 2019-06-08 RX ADMIN — ENOXAPARIN SODIUM 30 MG: 30 INJECTION SUBCUTANEOUS at 09:22

## 2019-06-08 RX ADMIN — POLYVINYL ALCOHOL 1 DROP: 14 SOLUTION/ DROPS OPHTHALMIC at 13:32

## 2019-06-08 RX ADMIN — MAGNESIUM HYDROXIDE 30 ML: 400 SUSPENSION ORAL at 14:47

## 2019-06-08 RX ADMIN — POLYVINYL ALCOHOL 1 DROP: 14 SOLUTION/ DROPS OPHTHALMIC at 09:19

## 2019-06-08 ASSESSMENT — PAIN SCALES - GENERAL
PAINLEVEL_OUTOF10: 5
PAINLEVEL_OUTOF10: 7
PAINLEVEL_OUTOF10: 8
PAINLEVEL_OUTOF10: 7
PAINLEVEL_OUTOF10: 4
PAINLEVEL_OUTOF10: 4
PAINLEVEL_OUTOF10: 5

## 2019-06-08 ASSESSMENT — PAIN DESCRIPTION - PAIN TYPE
TYPE: ACUTE PAIN

## 2019-06-08 ASSESSMENT — PAIN DESCRIPTION - DESCRIPTORS
DESCRIPTORS: ACHING;BURNING
DESCRIPTORS: ACHING;CONSTANT;BURNING;DISCOMFORT
DESCRIPTORS: ACHING;CONSTANT;BURNING;DISCOMFORT
DESCRIPTORS: DISCOMFORT

## 2019-06-08 ASSESSMENT — PAIN DESCRIPTION - FREQUENCY
FREQUENCY: CONTINUOUS
FREQUENCY: CONTINUOUS

## 2019-06-08 ASSESSMENT — PAIN - FUNCTIONAL ASSESSMENT
PAIN_FUNCTIONAL_ASSESSMENT: PREVENTS OR INTERFERES SOME ACTIVE ACTIVITIES AND ADLS
PAIN_FUNCTIONAL_ASSESSMENT: PREVENTS OR INTERFERES SOME ACTIVE ACTIVITIES AND ADLS

## 2019-06-08 ASSESSMENT — PAIN DESCRIPTION - ONSET
ONSET: ON-GOING
ONSET: ON-GOING

## 2019-06-08 ASSESSMENT — PAIN DESCRIPTION - PROGRESSION
CLINICAL_PROGRESSION: NOT CHANGED
CLINICAL_PROGRESSION: NOT CHANGED

## 2019-06-08 ASSESSMENT — PAIN DESCRIPTION - LOCATION
LOCATION: BACK

## 2019-06-08 NOTE — PROGRESS NOTES
Department of Neurosurgery  Attending Progress Note    CHIEF COMPLAINT:    SUBJECTIVE:  Seen today for back pain and possible T12 pathologic fracture with intractable pain and inability to ambulate    ROS:    OBJECTIVE  Physical  VITALS:  BP (!) 109/55   Pulse 77   Temp 97.1 °F (36.2 °C) (Temporal)   Resp 16   Ht 5' 2\" (1.575 m)   Wt 93 lb 9.6 oz (42.5 kg)   SpO2 98%   BMI 17.12 kg/m²   NEUROLOGIC:  Mental Status Exam:  Level of Alertness:   awake  Orientation:   person, place, time  Motor Exam:  Motor exam is symmetrical 5 out of 5 all extremities bilaterally  Sensory:  Sensory intact    Data  CBC:   Lab Results   Component Value Date    WBC 9.2 06/07/2019    RBC 4.49 06/07/2019    HGB 13.8 06/07/2019    HCT 42.3 06/07/2019    MCV 94.2 06/07/2019    MCH 30.7 06/07/2019    MCHC 32.6 06/07/2019    RDW 13.7 06/07/2019     06/07/2019    MPV 11.9 06/07/2019     BMP:    Lab Results   Component Value Date     06/07/2019    K 3.8 06/07/2019    CL 95 06/07/2019    CO2 29 06/07/2019    BUN 30 06/07/2019    LABALBU 3.5 01/08/2017    CREATININE 1.0 06/07/2019    CALCIUM 9.2 06/07/2019    GFRAA >60 06/07/2019    LABGLOM 52 06/07/2019    GLUCOSE 96 06/07/2019     Current Inpatient Medications  Current Facility-Administered Medications: oxyCODONE-acetaminophen (PERCOCET) 5-325 MG per tablet 1 tablet, 1 tablet, Oral, Q4H PRN  HYDROmorphone (DILAUDID) injection 0.5 mg, 0.5 mg, Intravenous, Q4H PRN  enoxaparin (LOVENOX) injection 30 mg, 30 mg, Subcutaneous, Daily  hydrALAZINE (APRESOLINE) injection 5 mg, 5 mg, Intravenous, Q6H PRN  gadoteridol (PROHANCE) injection 9 mL, 9 mL, Intravenous, ONCE PRN  carbidopa-levodopa (SINEMET)  MG per tablet 1 tablet, 1 tablet, Oral, BID  vitamin D (CHOLECALCIFEROL) tablet 1,000 Units, 1 tablet, Oral, QAM  aspirin EC tablet 81 mg, 81 mg, Oral, Nightly  furosemide (LASIX) tablet 40 mg, 40 mg, Oral, BID  acetaminophen (TYLENOL) tablet 1,000 mg, 1,000 mg, Oral, Q6H

## 2019-06-08 NOTE — PROGRESS NOTES
Subjective:    Chief complaint:    Having issues with pain  Daughter is by the bedside  They do have questions about proposed procedure on Monday    Objective:    /62   Pulse 80   Temp 97 °F (36.1 °C) (Temporal)   Resp 18   Ht 5' 2\" (1.575 m)   Wt 93 lb 9.6 oz (42.5 kg)   SpO2 97%   BMI 17.12 kg/m²   General : Awake ,alert,no distress. Heart:  RRR, no murmurs, gallops, or rubs. Lungs:  CTA bilaterally, no wheeze, rales or rhonchi  Abd: bowel sounds present, nontender, nondistended, no masses  Extrem:  No clubbing, cyanosis, or edema    CBC:   Lab Results   Component Value Date    WBC 10.1 06/08/2019    RBC 4.08 06/08/2019    HGB 12.8 06/08/2019    HCT 39.6 06/08/2019    MCV 97.1 06/08/2019    MCH 31.4 06/08/2019    MCHC 32.3 06/08/2019    RDW 14.0 06/08/2019     06/08/2019    MPV 11.7 06/08/2019     BMP:    Lab Results   Component Value Date     06/08/2019    K 5.0 06/08/2019    K 3.8 06/07/2019    CL 98 06/08/2019    CO2 26 06/08/2019    BUN 28 06/08/2019    LABALBU 3.5 01/08/2017    CREATININE 1.0 06/08/2019    CALCIUM 9.2 06/08/2019    GFRAA >60 06/08/2019    LABGLOM 52 06/08/2019    GLUCOSE 73 06/08/2019     PT/INR:    Lab Results   Component Value Date    PROTIME 10.6 06/08/2019    INR 0.9 06/08/2019     Troponin:    Lab Results   Component Value Date    TROPONINI <0.01 01/04/2017       No results for input(s): LABURIN in the last 72 hours.   Recent Labs     06/06/19  1317   BC 24 Hours- no growth     Recent Labs     06/06/19  1317   BLOODCULT2 24 Hours- no growth         Current Facility-Administered Medications:     oxyCODONE-acetaminophen (PERCOCET) 5-325 MG per tablet 1 tablet, 1 tablet, Oral, Q4H PRN, NICOLE Ruiz, 1 tablet at 06/08/19 1406    HYDROmorphone (DILAUDID) injection 0.5 mg, 0.5 mg, Intravenous, Q4H PRN, NICOLE Ruiz    enoxaparin (LOVENOX) injection 30 mg, 30 mg, Subcutaneous, Daily, Maegan Galarza DO, 30 mg at 06/08/19 7957    hydrALAZINE

## 2019-06-09 ENCOUNTER — APPOINTMENT (OUTPATIENT)
Dept: GENERAL RADIOLOGY | Age: 84
DRG: 478 | End: 2019-06-09
Attending: INTERNAL MEDICINE
Payer: MEDICARE

## 2019-06-09 PROCEDURE — 6370000000 HC RX 637 (ALT 250 FOR IP): Performed by: PHYSICIAN ASSISTANT

## 2019-06-09 PROCEDURE — 6370000000 HC RX 637 (ALT 250 FOR IP): Performed by: INTERNAL MEDICINE

## 2019-06-09 PROCEDURE — 2580000003 HC RX 258: Performed by: INTERNAL MEDICINE

## 2019-06-09 PROCEDURE — 1200000000 HC SEMI PRIVATE

## 2019-06-09 PROCEDURE — 71045 X-RAY EXAM CHEST 1 VIEW: CPT

## 2019-06-09 PROCEDURE — 99232 SBSQ HOSP IP/OBS MODERATE 35: CPT | Performed by: NEUROLOGICAL SURGERY

## 2019-06-09 RX ORDER — SODIUM PHOSPHATE, DIBASIC AND SODIUM PHOSPHATE, MONOBASIC 7; 19 G/133ML; G/133ML
1 ENEMA RECTAL
Status: COMPLETED | OUTPATIENT
Start: 2019-06-09 | End: 2019-06-09

## 2019-06-09 RX ADMIN — SODIUM PHOSPHATE, DIBASIC AND SODIUM PHOSPHATE, MONOBASIC 1 ENEMA: 7; 19 ENEMA RECTAL at 16:49

## 2019-06-09 RX ADMIN — OXYCODONE HYDROCHLORIDE AND ACETAMINOPHEN 1 TABLET: 5; 325 TABLET ORAL at 13:42

## 2019-06-09 RX ADMIN — OXYCODONE HYDROCHLORIDE AND ACETAMINOPHEN 1 TABLET: 5; 325 TABLET ORAL at 21:54

## 2019-06-09 RX ADMIN — POLYVINYL ALCOHOL 1 DROP: 14 SOLUTION/ DROPS OPHTHALMIC at 13:43

## 2019-06-09 RX ADMIN — VITAMIN D, TAB 1000IU (100/BT) 1000 UNITS: 25 TAB at 08:42

## 2019-06-09 RX ADMIN — OXYCODONE HYDROCHLORIDE AND ACETAMINOPHEN 1 TABLET: 5; 325 TABLET ORAL at 18:03

## 2019-06-09 RX ADMIN — POLYVINYL ALCOHOL 1 DROP: 14 SOLUTION/ DROPS OPHTHALMIC at 21:31

## 2019-06-09 RX ADMIN — FUROSEMIDE 40 MG: 40 TABLET ORAL at 08:42

## 2019-06-09 RX ADMIN — CARBIDOPA AND LEVODOPA 1 TABLET: 25; 100 TABLET ORAL at 21:32

## 2019-06-09 RX ADMIN — POLYVINYL ALCOHOL 1 DROP: 14 SOLUTION/ DROPS OPHTHALMIC at 08:43

## 2019-06-09 RX ADMIN — Medication 10 ML: at 08:43

## 2019-06-09 RX ADMIN — OXYCODONE HYDROCHLORIDE AND ACETAMINOPHEN 1 TABLET: 5; 325 TABLET ORAL at 08:45

## 2019-06-09 RX ADMIN — OXYCODONE HYDROCHLORIDE AND ACETAMINOPHEN 1 TABLET: 5; 325 TABLET ORAL at 04:13

## 2019-06-09 RX ADMIN — Medication 10 MG: at 13:49

## 2019-06-09 RX ADMIN — CARBIDOPA AND LEVODOPA 1 TABLET: 25; 100 TABLET ORAL at 08:42

## 2019-06-09 RX ADMIN — Medication 10 ML: at 22:40

## 2019-06-09 ASSESSMENT — PAIN SCALES - GENERAL
PAINLEVEL_OUTOF10: 4
PAINLEVEL_OUTOF10: 3
PAINLEVEL_OUTOF10: 7
PAINLEVEL_OUTOF10: 3
PAINLEVEL_OUTOF10: 5
PAINLEVEL_OUTOF10: 5
PAINLEVEL_OUTOF10: 0
PAINLEVEL_OUTOF10: 7
PAINLEVEL_OUTOF10: 5

## 2019-06-09 ASSESSMENT — PAIN DESCRIPTION - PAIN TYPE
TYPE: ACUTE PAIN

## 2019-06-09 ASSESSMENT — PAIN DESCRIPTION - ONSET
ONSET: ON-GOING

## 2019-06-09 ASSESSMENT — PAIN DESCRIPTION - LOCATION
LOCATION: BACK

## 2019-06-09 ASSESSMENT — PAIN DESCRIPTION - ORIENTATION
ORIENTATION: LOWER;MID
ORIENTATION: MID;LOWER

## 2019-06-09 ASSESSMENT — PAIN DESCRIPTION - FREQUENCY
FREQUENCY: CONTINUOUS

## 2019-06-09 ASSESSMENT — PAIN DESCRIPTION - DESCRIPTORS
DESCRIPTORS: ACHING;CONSTANT;BURNING
DESCRIPTORS: ACHING;DISCOMFORT;SORE
DESCRIPTORS: ACHING;DISCOMFORT;DULL
DESCRIPTORS: ACHING;DISCOMFORT;SORE

## 2019-06-09 NOTE — PROGRESS NOTES
Subjective:    Chief complaint:    No new issues  Constipated  Had suppository    Objective:    /68   Pulse 95   Temp 97.8 °F (36.6 °C) (Temporal)   Resp 18   Ht 5' 2\" (1.575 m)   Wt 98 lb 6.4 oz (44.6 kg) Comment: 98.4lb  SpO2 99%   BMI 18.00 kg/m²   General : Awake ,alert,no distress. Heart:  RRR, no murmurs, gallops, or rubs. Lungs:  CTA bilaterally, no wheeze, rales or rhonchi  Abd: bowel sounds present, nontender, nondistended, no masses  Extrem:  No clubbing, cyanosis, or edema    CBC:   Lab Results   Component Value Date    WBC 10.1 06/08/2019    RBC 4.08 06/08/2019    HGB 12.8 06/08/2019    HCT 39.6 06/08/2019    MCV 97.1 06/08/2019    MCH 31.4 06/08/2019    MCHC 32.3 06/08/2019    RDW 14.0 06/08/2019     06/08/2019    MPV 11.7 06/08/2019     BMP:    Lab Results   Component Value Date     06/08/2019    K 5.0 06/08/2019    K 3.8 06/07/2019    CL 98 06/08/2019    CO2 26 06/08/2019    BUN 28 06/08/2019    LABALBU 3.5 01/08/2017    CREATININE 1.0 06/08/2019    CALCIUM 9.2 06/08/2019    GFRAA >60 06/08/2019    LABGLOM 52 06/08/2019    GLUCOSE 73 06/08/2019     PT/INR:    Lab Results   Component Value Date    PROTIME 10.6 06/08/2019    INR 0.9 06/08/2019     Troponin:    Lab Results   Component Value Date    TROPONINI <0.01 01/04/2017       No results for input(s): LABURIN in the last 72 hours. No results for input(s): BC in the last 72 hours. No results for input(s): Shayla Speller in the last 72 hours.       Current Facility-Administered Medications:     oxyCODONE-acetaminophen (PERCOCET) 5-325 MG per tablet 1 tablet, 1 tablet, Oral, Q4H PRN, NICOLE Burnette, 1 tablet at 06/09/19 1342    HYDROmorphone (DILAUDID) injection 0.5 mg, 0.5 mg, Intravenous, Q4H PRN, NICOLE Burnette    hydrALAZINE (APRESOLINE) injection 5 mg, 5 mg, Intravenous, Q6H PRN, Kieran Marshall MD    [facility]) injection 9 mL, 9 mL, Intravenous, ONCE PRN, Yaa Petersen MD    carbidopa-levodopa (SINEMET)  MG per tablet 1 tablet, 1 tablet, Oral, BID, [place]on [place], DO, 1 tablet at 06/09/19 5990    vitamin D (CHOLECALCIFEROL) tablet 1,000 Units, 1 tablet, Oral, QAM, [place], DO, 1,000 Units at 06/09/19 4711    furosemide (LASIX) tablet 40 mg, 40 mg, Oral, BID, Dilcia Chihuahua, DO, 40 mg at 06/09/19 2428    acetaminophen (TYLENOL) tablet 1,000 mg, 1,000 mg, Oral, Q6H PRN, Dilcia Ch[place]ua, DO, 1,000 mg at 06/07/19 0505    bisacodyl (DULCOLAX) suppository 10 mg, 10 mg, Rectal, Daily PRN, Dilcia Chihuahua, DO, 10 mg at 06/09/19 1349    polyvinyl alcohol (LIQUIFILM TEARS) 1.4 % ophthalmic solution 1 drop, 1 drop, Left Eye, TID, Dilcia Chihu[place]ua, DO, 1 drop at 06/09/19 1343    sodium chloride flush 0.9 % injection 10 mL, 10 mL, Intravenous, 2 times per day, [place]on [place], DO, 10 mL at 06/09/19 0843    sodium chloride flush 0.9 % injection 10 mL, 10 mL, Intravenous, PRN, Dilcia Chihuahua, DO    magnesium hydroxide (MILK OF MAGNESIA) 400 MG/5ML suspension 30 mL, 30 mL, Oral, Daily PRN, [place]on [place], DO, 30 mL at 06/08/19 1447    ondansetron (ZOFRAN) injection 4 mg, 4 mg, Intravenous, Q6H PRN, F[place]on [place], DO    DIET GENERAL;  Diet NPO, After Midnight    XR CHEST PORTABLE   Final Result   Left basilar atelectasis. MRI LUMBAR SPINE W WO CONTRAST   Final Result   No acute fractures or abnormal enhancements in the lumbar spine. Acute fracture of T12 vertebral body extending towards the pedicles   all with abnormal enhancements as described on the MRI of the lumbar   spine performed same time. MRI THORACIC SPINE W WO CONTRAST   Final Result   1. Acute compression fracture deformity of T12 with the significant   loss of height of the related but centrally approximately 60%. 2. Protrusion of bone fragments from the posterior superior corner of   T12 into the anterior spinal canal but not direct impresses on the   distal thoracic spinal cord.       3. No signs of cord

## 2019-06-10 ENCOUNTER — APPOINTMENT (OUTPATIENT)
Dept: GENERAL RADIOLOGY | Age: 84
DRG: 478 | End: 2019-06-10
Attending: INTERNAL MEDICINE
Payer: MEDICARE

## 2019-06-10 ENCOUNTER — ANESTHESIA (OUTPATIENT)
Dept: OPERATING ROOM | Age: 84
DRG: 478 | End: 2019-06-10
Payer: MEDICARE

## 2019-06-10 VITALS
SYSTOLIC BLOOD PRESSURE: 136 MMHG | RESPIRATION RATE: 16 BRPM | DIASTOLIC BLOOD PRESSURE: 96 MMHG | OXYGEN SATURATION: 95 %

## 2019-06-10 PROCEDURE — 3600000014 HC SURGERY LEVEL 4 ADDTL 15MIN: Performed by: NEUROLOGICAL SURGERY

## 2019-06-10 PROCEDURE — 0PS43ZZ REPOSITION THORACIC VERTEBRA, PERCUTANEOUS APPROACH: ICD-10-PCS | Performed by: NEUROLOGICAL SURGERY

## 2019-06-10 PROCEDURE — 3700000001 HC ADD 15 MINUTES (ANESTHESIA): Performed by: NEUROLOGICAL SURGERY

## 2019-06-10 PROCEDURE — 2580000003 HC RX 258: Performed by: INTERNAL MEDICINE

## 2019-06-10 PROCEDURE — 88307 TISSUE EXAM BY PATHOLOGIST: CPT

## 2019-06-10 PROCEDURE — 1200000000 HC SEMI PRIVATE

## 2019-06-10 PROCEDURE — 2500000003 HC RX 250 WO HCPCS: Performed by: NEUROLOGICAL SURGERY

## 2019-06-10 PROCEDURE — 2580000003 HC RX 258: Performed by: NEUROLOGICAL SURGERY

## 2019-06-10 PROCEDURE — 88311 DECALCIFY TISSUE: CPT

## 2019-06-10 PROCEDURE — 97530 THERAPEUTIC ACTIVITIES: CPT

## 2019-06-10 PROCEDURE — 7100000000 HC PACU RECOVERY - FIRST 15 MIN: Performed by: NEUROLOGICAL SURGERY

## 2019-06-10 PROCEDURE — 0PB40ZX EXCISION OF THORACIC VERTEBRA, OPEN APPROACH, DIAGNOSTIC: ICD-10-PCS | Performed by: NEUROLOGICAL SURGERY

## 2019-06-10 PROCEDURE — 3600000004 HC SURGERY LEVEL 4 BASE: Performed by: NEUROLOGICAL SURGERY

## 2019-06-10 PROCEDURE — 6370000000 HC RX 637 (ALT 250 FOR IP): Performed by: PHYSICIAN ASSISTANT

## 2019-06-10 PROCEDURE — 22513 PERQ VERTEBRAL AUGMENTATION: CPT | Performed by: NEUROLOGICAL SURGERY

## 2019-06-10 PROCEDURE — 3700000000 HC ANESTHESIA ATTENDED CARE: Performed by: NEUROLOGICAL SURGERY

## 2019-06-10 PROCEDURE — 6360000002 HC RX W HCPCS: Performed by: NURSE ANESTHETIST, CERTIFIED REGISTERED

## 2019-06-10 PROCEDURE — 2709999900 HC NON-CHARGEABLE SUPPLY: Performed by: NEUROLOGICAL SURGERY

## 2019-06-10 PROCEDURE — 3209999900 FLUORO FOR SURGICAL PROCEDURES

## 2019-06-10 PROCEDURE — 6370000000 HC RX 637 (ALT 250 FOR IP): Performed by: INTERNAL MEDICINE

## 2019-06-10 PROCEDURE — 6360000002 HC RX W HCPCS: Performed by: ANESTHESIOLOGY

## 2019-06-10 PROCEDURE — 2580000003 HC RX 258: Performed by: PHYSICIAN ASSISTANT

## 2019-06-10 PROCEDURE — 97161 PT EVAL LOW COMPLEX 20 MIN: CPT

## 2019-06-10 PROCEDURE — 6360000002 HC RX W HCPCS: Performed by: NEUROLOGICAL SURGERY

## 2019-06-10 PROCEDURE — 0PU43JZ SUPPLEMENT THORACIC VERTEBRA WITH SYNTHETIC SUBSTITUTE, PERCUTANEOUS APPROACH: ICD-10-PCS | Performed by: NEUROLOGICAL SURGERY

## 2019-06-10 PROCEDURE — 7100000001 HC PACU RECOVERY - ADDTL 15 MIN: Performed by: NEUROLOGICAL SURGERY

## 2019-06-10 DEVICE — BONE CEMENT C01B HV-R WITH MIXER  US
Type: IMPLANTABLE DEVICE | Site: SPINE THORACIC | Status: FUNCTIONAL
Brand: KYPHON® HV-R® BONE CEMENT AND KYPHON® MIXER PACK

## 2019-06-10 RX ORDER — PROPOFOL 10 MG/ML
INJECTION, EMULSION INTRAVENOUS CONTINUOUS PRN
Status: DISCONTINUED | OUTPATIENT
Start: 2019-06-10 | End: 2019-06-10 | Stop reason: SDUPTHER

## 2019-06-10 RX ORDER — BUPIVACAINE HYDROCHLORIDE 2.5 MG/ML
INJECTION, SOLUTION EPIDURAL; INFILTRATION; INTRACAUDAL PRN
Status: DISCONTINUED | OUTPATIENT
Start: 2019-06-10 | End: 2019-06-10 | Stop reason: HOSPADM

## 2019-06-10 RX ORDER — ONDANSETRON 2 MG/ML
4 INJECTION INTRAMUSCULAR; INTRAVENOUS EVERY 6 HOURS PRN
Status: DISCONTINUED | OUTPATIENT
Start: 2019-06-10 | End: 2019-06-11 | Stop reason: HOSPADM

## 2019-06-10 RX ORDER — LIDOCAINE HYDROCHLORIDE AND EPINEPHRINE 5; 5 MG/ML; UG/ML
INJECTION, SOLUTION INFILTRATION; PERINEURAL PRN
Status: DISCONTINUED | OUTPATIENT
Start: 2019-06-10 | End: 2019-06-10 | Stop reason: HOSPADM

## 2019-06-10 RX ORDER — LABETALOL HYDROCHLORIDE 5 MG/ML
5 INJECTION, SOLUTION INTRAVENOUS EVERY 10 MIN PRN
Status: DISCONTINUED | OUTPATIENT
Start: 2019-06-10 | End: 2019-06-10 | Stop reason: HOSPADM

## 2019-06-10 RX ORDER — DOCUSATE SODIUM 100 MG/1
100 CAPSULE, LIQUID FILLED ORAL 2 TIMES DAILY
Status: DISCONTINUED | OUTPATIENT
Start: 2019-06-10 | End: 2019-06-11 | Stop reason: HOSPADM

## 2019-06-10 RX ORDER — SODIUM CHLORIDE 0.9 % (FLUSH) 0.9 %
10 SYRINGE (ML) INJECTION PRN
Status: DISCONTINUED | OUTPATIENT
Start: 2019-06-10 | End: 2019-06-10 | Stop reason: HOSPADM

## 2019-06-10 RX ORDER — HYDRALAZINE HYDROCHLORIDE 20 MG/ML
5 INJECTION INTRAMUSCULAR; INTRAVENOUS EVERY 10 MIN PRN
Status: DISCONTINUED | OUTPATIENT
Start: 2019-06-10 | End: 2019-06-10 | Stop reason: HOSPADM

## 2019-06-10 RX ORDER — CEFAZOLIN SODIUM 1 G/50ML
1 SOLUTION INTRAVENOUS SEE ADMIN INSTRUCTIONS
Status: DISCONTINUED | OUTPATIENT
Start: 2019-06-10 | End: 2019-06-10 | Stop reason: HOSPADM

## 2019-06-10 RX ORDER — MEPERIDINE HYDROCHLORIDE 50 MG/ML
12.5 INJECTION INTRAMUSCULAR; INTRAVENOUS; SUBCUTANEOUS EVERY 5 MIN PRN
Status: DISCONTINUED | OUTPATIENT
Start: 2019-06-10 | End: 2019-06-10 | Stop reason: HOSPADM

## 2019-06-10 RX ORDER — FENTANYL CITRATE 50 UG/ML
INJECTION, SOLUTION INTRAMUSCULAR; INTRAVENOUS PRN
Status: DISCONTINUED | OUTPATIENT
Start: 2019-06-10 | End: 2019-06-10 | Stop reason: SDUPTHER

## 2019-06-10 RX ORDER — SODIUM CHLORIDE 0.9 % (FLUSH) 0.9 %
10 SYRINGE (ML) INJECTION EVERY 12 HOURS SCHEDULED
Status: DISCONTINUED | OUTPATIENT
Start: 2019-06-10 | End: 2019-06-11 | Stop reason: HOSPADM

## 2019-06-10 RX ORDER — SODIUM CHLORIDE 0.9 % (FLUSH) 0.9 %
10 SYRINGE (ML) INJECTION PRN
Status: DISCONTINUED | OUTPATIENT
Start: 2019-06-10 | End: 2019-06-11 | Stop reason: HOSPADM

## 2019-06-10 RX ORDER — MORPHINE SULFATE 2 MG/ML
2 INJECTION, SOLUTION INTRAMUSCULAR; INTRAVENOUS EVERY 5 MIN PRN
Status: DISCONTINUED | OUTPATIENT
Start: 2019-06-10 | End: 2019-06-10 | Stop reason: HOSPADM

## 2019-06-10 RX ORDER — SODIUM CHLORIDE 9 MG/ML
INJECTION, SOLUTION INTRAVENOUS CONTINUOUS
Status: DISCONTINUED | OUTPATIENT
Start: 2019-06-10 | End: 2019-06-10

## 2019-06-10 RX ORDER — PROMETHAZINE HYDROCHLORIDE 25 MG/ML
6.25 INJECTION, SOLUTION INTRAMUSCULAR; INTRAVENOUS
Status: DISCONTINUED | OUTPATIENT
Start: 2019-06-10 | End: 2019-06-10 | Stop reason: HOSPADM

## 2019-06-10 RX ORDER — SODIUM CHLORIDE 0.9 % (FLUSH) 0.9 %
10 SYRINGE (ML) INJECTION EVERY 12 HOURS SCHEDULED
Status: DISCONTINUED | OUTPATIENT
Start: 2019-06-10 | End: 2019-06-10 | Stop reason: HOSPADM

## 2019-06-10 RX ADMIN — OXYCODONE HYDROCHLORIDE AND ACETAMINOPHEN 1 TABLET: 5; 325 TABLET ORAL at 17:10

## 2019-06-10 RX ADMIN — POLYVINYL ALCOHOL 1 DROP: 14 SOLUTION/ DROPS OPHTHALMIC at 13:57

## 2019-06-10 RX ADMIN — OXYCODONE HYDROCHLORIDE AND ACETAMINOPHEN 1 TABLET: 5; 325 TABLET ORAL at 06:55

## 2019-06-10 RX ADMIN — Medication 10 ML: at 09:04

## 2019-06-10 RX ADMIN — CEFAZOLIN SODIUM 2 G: 1 SOLUTION INTRAVENOUS at 12:15

## 2019-06-10 RX ADMIN — POLYVINYL ALCOHOL 1 DROP: 14 SOLUTION/ DROPS OPHTHALMIC at 20:04

## 2019-06-10 RX ADMIN — CARBIDOPA AND LEVODOPA 1 TABLET: 25; 100 TABLET ORAL at 20:04

## 2019-06-10 RX ADMIN — OXYCODONE HYDROCHLORIDE AND ACETAMINOPHEN 1 TABLET: 5; 325 TABLET ORAL at 22:42

## 2019-06-10 RX ADMIN — SODIUM CHLORIDE: 9 INJECTION, SOLUTION INTRAVENOUS at 09:10

## 2019-06-10 RX ADMIN — POLYVINYL ALCOHOL 1 DROP: 14 SOLUTION/ DROPS OPHTHALMIC at 09:04

## 2019-06-10 RX ADMIN — FENTANYL CITRATE 50 MCG: 50 INJECTION, SOLUTION INTRAMUSCULAR; INTRAVENOUS at 12:14

## 2019-06-10 RX ADMIN — MORPHINE SULFATE 2 MG: 2 INJECTION, SOLUTION INTRAMUSCULAR; INTRAVENOUS at 13:14

## 2019-06-10 RX ADMIN — FUROSEMIDE 40 MG: 40 TABLET ORAL at 09:03

## 2019-06-10 RX ADMIN — Medication 10 ML: at 22:42

## 2019-06-10 RX ADMIN — PROPOFOL 90 MCG/KG/MIN: 10 INJECTION, EMULSION INTRAVENOUS at 12:14

## 2019-06-10 RX ADMIN — Medication 10 ML: at 10:00

## 2019-06-10 RX ADMIN — VITAMIN D, TAB 1000IU (100/BT) 1000 UNITS: 25 TAB at 09:03

## 2019-06-10 RX ADMIN — CARBIDOPA AND LEVODOPA 1 TABLET: 25; 100 TABLET ORAL at 09:03

## 2019-06-10 ASSESSMENT — PULMONARY FUNCTION TESTS
PIF_VALUE: 0
PIF_VALUE: 1
PIF_VALUE: 0
PIF_VALUE: 1
PIF_VALUE: 0
PIF_VALUE: 1
PIF_VALUE: 0

## 2019-06-10 ASSESSMENT — PAIN DESCRIPTION - LOCATION
LOCATION: BACK

## 2019-06-10 ASSESSMENT — PAIN DESCRIPTION - ORIENTATION
ORIENTATION: LOWER
ORIENTATION: LEFT;LOWER
ORIENTATION: LOWER

## 2019-06-10 ASSESSMENT — PAIN DESCRIPTION - PAIN TYPE
TYPE: ACUTE PAIN
TYPE: SURGICAL PAIN

## 2019-06-10 ASSESSMENT — PAIN SCALES - GENERAL
PAINLEVEL_OUTOF10: 7
PAINLEVEL_OUTOF10: 6
PAINLEVEL_OUTOF10: 7
PAINLEVEL_OUTOF10: 5
PAINLEVEL_OUTOF10: 4
PAINLEVEL_OUTOF10: 0
PAINLEVEL_OUTOF10: 0
PAINLEVEL_OUTOF10: 4
PAINLEVEL_OUTOF10: 0

## 2019-06-10 ASSESSMENT — PAIN DESCRIPTION - DESCRIPTORS
DESCRIPTORS: ACHING;DISCOMFORT
DESCRIPTORS: ACHING
DESCRIPTORS: ACHING;DISCOMFORT

## 2019-06-10 NOTE — OP NOTE
and the operative procedure, which she was  about to undergo. Next, induction of monitored anesthesia care was then  commenced. Upon completion of induction of monitored anesthesia care,  she received preoperative antibiotics. She then flipped into prone  position on a Tevin table. All pressure points were padded. Her  thoracic region was prepped and draped in the usual sterile fashion. Using biplanar fluoroscopy, I then identified the entry point into the  T12 pedicle on the patient's left side. I infiltrated the skin with  lidocaine with epinephrine 1:200,000. I then used the #15-blade to make  a stab incision. I docked a [employer] One-Step Osteo introducer on the  facet, advanced it through the pedicle into the vertebral body. I was  able to get it across midline. I removed the inner stylet and left the  outer working cannula in place. I inserted a bone biopsy tool. After  obtaining a biopsy, I inserted a Medtronic Kyphon balloon that was  inflated to 120 psi. I deflated the balloon and injected a total of 4.5  mL of polymethylmethacrylate into the vertebral body. After this was  done, I then removed the outer working cannula. I obtained final AP and  lateral fluoroscopic image. There was no evidence of extravasation of  cement. The skin was then subsequently closed with Dermabond. The  patient was then flipped into the supine position on her hospital bed  and transported to the postanesthesia care unit in a stable condition. There were no complications. Counts were correct. I was present for  the entire case.         Albert Jaramillo MD    D: 06/10/2019 13:03:43       T: 06/10/2019 18:11:17     [email or web address]  Job#: [id number]     Doc#: [id number]    CC:

## 2019-06-10 NOTE — PROGRESS NOTES
Subjective: The patient is awake and alert. Still with pain on movement but slightly better since admisison  Objective:    BP (!) 108/56   Pulse 87   Temp 97.6 °F (36.4 °C) (Temporal)   Resp 18   Ht 5' 2\" (1.575 m)   Wt 98 lb 6.4 oz (44.6 kg)   SpO2 92%   BMI 18.00 kg/m²     No intake/output data recorded. HEENT: NCAT,  PERRLA, No JVD  Heart:  RRR, no murmurs, gallops, or rubs.   Lungs:  CTA bilaterally, no wheeze, rales or rhonchi  Abd: bowel sounds present, nontender, nondistended, no masses  Extrem:  No clubbing, cyanosis, or edema     Recent Labs     06/08/19  0830   WBC 10.1   HGB 12.8   HCT 39.6          Recent Labs     06/08/19  0830      K 5.0   CL 98   CO2 26   BUN 28*   CREATININE 1.0   CALCIUM 9.2       Assessment:    Patient Active Problem List   Diagnosis    [facility]trochanteric fracture of right femur ([facility]    Parkinson's disease ([facility]    Renal insufficiency    Hypertension    Abdominal pain    Chronic bilateral thoracic back pain    Thoracic compression fracture, closed, initial encounter ([facility]       Plan:    Admitted to med/surg for thoracic compression fracture, likely associated with osteopetrosis and  advanced age   MRI with compression fractures  For or today   Neurosurgery following   Pain control     HTN accelerated from pain   Lifestyle modifications discussed  Continue home meds  PRN iv hydralizine      DVT prophylaxis  PT/OT  DC planning- not sure that she is safe for home , needs ARI /SNF   Await acceptance       DVT and GERD prophylaxis    All consultants notes reviewed    Didi Bowen MD  3:30 PM  6/10/2019

## 2019-06-11 VITALS
WEIGHT: 98.4 LBS | OXYGEN SATURATION: 92 % | RESPIRATION RATE: 16 BRPM | HEART RATE: 95 BPM | SYSTOLIC BLOOD PRESSURE: 122 MMHG | BODY MASS INDEX: 18.11 KG/M2 | DIASTOLIC BLOOD PRESSURE: 60 MMHG | TEMPERATURE: 97.2 F | HEIGHT: 62 IN

## 2019-06-11 LAB
BLOOD CULTURE, ROUTINE: NORMAL
CULTURE, BLOOD 2: NORMAL

## 2019-06-11 PROCEDURE — 97535 SELF CARE MNGMENT TRAINING: CPT

## 2019-06-11 PROCEDURE — 6370000000 HC RX 637 (ALT 250 FOR IP): Performed by: INTERNAL MEDICINE

## 2019-06-11 PROCEDURE — 97530 THERAPEUTIC ACTIVITIES: CPT

## 2019-06-11 PROCEDURE — 2580000003 HC RX 258: Performed by: PHYSICIAN ASSISTANT

## 2019-06-11 PROCEDURE — 97165 OT EVAL LOW COMPLEX 30 MIN: CPT

## 2019-06-11 PROCEDURE — 6370000000 HC RX 637 (ALT 250 FOR IP): Performed by: PHYSICIAN ASSISTANT

## 2019-06-11 RX ORDER — CYCLOBENZAPRINE HCL 10 MG
10 TABLET ORAL 3 TIMES DAILY PRN
Status: DISCONTINUED | OUTPATIENT
Start: 2019-06-11 | End: 2019-06-11 | Stop reason: HOSPADM

## 2019-06-11 RX ORDER — CYCLOBENZAPRINE HCL 10 MG
10 TABLET ORAL 3 TIMES DAILY PRN
Qty: 21 TABLET | Refills: 2 | Status: SHIPPED | OUTPATIENT
Start: 2019-06-11 | End: 2019-06-18

## 2019-06-11 RX ORDER — OXYCODONE HYDROCHLORIDE AND ACETAMINOPHEN 5; 325 MG/1; MG/1
1 TABLET ORAL EVERY 4 HOURS PRN
Qty: 42 TABLET | Refills: 0 | Status: SHIPPED | OUTPATIENT
Start: 2019-06-11 | End: 2019-06-18

## 2019-06-11 RX ADMIN — CARBIDOPA AND LEVODOPA 1 TABLET: 25; 100 TABLET ORAL at 09:49

## 2019-06-11 RX ADMIN — Medication 10 ML: at 09:49

## 2019-06-11 RX ADMIN — FUROSEMIDE 40 MG: 40 TABLET ORAL at 09:49

## 2019-06-11 RX ADMIN — DOCUSATE SODIUM 100 MG: 100 CAPSULE, LIQUID FILLED ORAL at 09:49

## 2019-06-11 RX ADMIN — POLYVINYL ALCOHOL 1 DROP: 14 SOLUTION/ DROPS OPHTHALMIC at 09:50

## 2019-06-11 RX ADMIN — VITAMIN D, TAB 1000IU (100/BT) 1000 UNITS: 25 TAB at 09:49

## 2019-06-11 RX ADMIN — OXYCODONE HYDROCHLORIDE AND ACETAMINOPHEN 1 TABLET: 5; 325 TABLET ORAL at 08:27

## 2019-06-11 RX ADMIN — BISACODYL 5 MG: 5 TABLET, COATED ORAL at 09:53

## 2019-06-11 ASSESSMENT — PAIN DESCRIPTION - PAIN TYPE: TYPE: SURGICAL PAIN

## 2019-06-11 ASSESSMENT — PAIN DESCRIPTION - ONSET: ONSET: ON-GOING

## 2019-06-11 ASSESSMENT — PAIN DESCRIPTION - ORIENTATION: ORIENTATION: LOWER

## 2019-06-11 ASSESSMENT — PAIN SCALES - GENERAL
PAINLEVEL_OUTOF10: 9
PAINLEVEL_OUTOF10: 4

## 2019-06-11 ASSESSMENT — PAIN DESCRIPTION - FREQUENCY: FREQUENCY: CONTINUOUS

## 2019-06-11 ASSESSMENT — PAIN DESCRIPTION - DESCRIPTORS: DESCRIPTORS: ACHING;DISCOMFORT;SORE

## 2019-06-11 ASSESSMENT — PAIN DESCRIPTION - LOCATION: LOCATION: BACK

## 2019-06-11 NOTE — DISCHARGE INSTR - COC
Continuity of Care Form    Patient Name: Elise Black   :  1928  MRN:  [id number]    Admit date:  2019  Discharge date:  2019    Code Status Order: Full Code   Advance Directives:   Advance Care Flowsheet Documentation     Date/Time Healthcare Directive Type of Healthcare Directive Copy in [address] Agent's Name Healthcare Agent's Phone Number    06/10/19 7361  Yes, patient has an advance directive for healthcare treatment  Living will;Health care treatment directive  No, copy requested from family  --  --  --    19 1809  Yes, patient has an advance directive for healthcare treatment  Living will;Health care treatment directive  No, copy requested from family  --  --  --          Admitting Physician:  Sofie Denton MD  PCP: Henrry Hendrix MD    Discharging Nurse: Elinor Smith Unit/Room#: 4422/4218-N  Discharging Unit Phone Number: [phone]    Emergency Contact:   Extended Emergency Contact Information  Primary Emergency Contact: Farshad Ghotra  Address: [address] [place] [address], [address] [place] [address] Phone: [phone]  Work Phone: [phone]  Relation: Child  Secondary Emergency Contact: [address] Phone: [phone]  Relation: Child    Past Surgical History:  Past Surgical History:   Procedure Laterality Date    CATARACT REMOVAL WITH IMPLANT Left 2016    HIP FRACTURE SURGERY Right 2015    ORIF left hip inter & subtrochanteric fx. w/long gamma nail. Katelyn Mooney MD    HYSTERECTOMY  ? robotic    KYPHOSIS SURGERY  2017    T8    KYPHOSIS SURGERY      KYPHOSIS SURGERY N/A 6/10/2019    T11 VERTEBRAL BODY BIOPSY & KYPHOPLASTY performed by Deidre Singer MD at [facility] Right 1997    Right hospitals. [facility].  Iraida Sawyer MD       Immunization History:   Immunization History   Administered

## 2019-06-11 NOTE — PROGRESS NOTES
1,000 Units, 1 tablet, Oral, QAM  furosemide (LASIX) tablet 40 mg, 40 mg, Oral, BID  acetaminophen (TYLENOL) tablet 1,000 mg, 1,000 mg, Oral, Q6H PRN  bisacodyl (DULCOLAX) suppository 10 mg, 10 mg, Rectal, Daily PRN  polyvinyl alcohol (LIQUIFILM TEARS) 1.4 % ophthalmic solution 1 drop, 1 drop, Left Eye, TID  magnesium hydroxide (MILK OF MAGNESIA) 400 MG/5ML suspension 30 mL, 30 mL, Oral, Daily PRN    ASSESSMENT AND PLAN:  Patient POD 1 s/p T12 kyphoplasty.  Stable.   -Pain control and expectations discussed  -Will obtain new imaging if no improvement  -PT/OT  -Discharge planning   -F/U in 4 weeks

## 2019-06-11 NOTE — PROGRESS NOTES
Mod I   Mod I   Grooming SBA   Mod I   UB Dressing SBA   Mod I   LB Dressing Dep  Sup    Bathing UB: SBA  LB: Dep  Sup    Toileting Mod A  Sup   Bed Mobility  Log roll: Min A  Supine to sit: Min A   Sit to supine: NT   Log roll Sup  Supine to sit: Sup   Sit to supine: Sup   Functional Transfers Sit to stand:Min A   Stand to sit:Min A  Stand pivot: Min A with w.w. Commode: Min/Mod A  Sup   Functional Mobility Min A with w.w. short household distance, ~20 x 2  Sup   Balance Sitting:     Static:  SBA    Dynamic:CGA  Standing: Min A     Activity Tolerance Fair- with lt. Ax. Visual/  Perceptual Glasses: yes                Hand dominance: R  UE ROM: RUE:  WFL  LUE:  WFL  Strength: RUE: grossly 4-/5 LUE: grossly 4-/5   Strength: B WFL  Fine Motor Coordination: B WFL    Hearing: WFL  Sensation: B UE: No c/o numbness or tingling  Tone: B UE: WFL  Edema: B UE: none noted                            Comments/Treatment: Upon arrival, patient supine in bed. Pt. Assisted with bed mobility, functional transfer/mobility training, ADL training with focus on safety, technique, precautions. At end of session, patient seated in bedside chair, all needs met, breakfast tray available, RN notified, with call light and phone within reach, all lines and tubes intact. Pt. Instructed RE: safe transfers/mobility, ADLs, role of OT, treatment plan, recs. , prec. Pt would benefit from continued skilled OT to increase functional independence and quality of life. Eval Complexity: Low  · History: Expanded chart review of medical records and additional review of physical, cognitive, or psychosocial history related to current functional performance  · Exam: 3+ performance deficits  · Assistance/Modification: Min/mod assistance or modifications required to perform tasks. May have comorbidities that affect occupational performance.     Assessment of current deficits   Functional mobility [x]  ADLs [x] Strength [x]  Cognition []  Functional

## 2019-06-11 NOTE — PROGRESS NOTES
Physical Therapy  Treatment Note  [facility] NEURO SPINE     Name: Fern Smith  : 1928  MRN: [id number]     Date of Service: 2019     Evaluating PT:  Lord Ventura, PT [id number]     Room #:  9874/5856-W  Diagnosis:  pathologic fracture at T12  Surgery: 6/10/19 T12 VERTEBRAL BODY BIOPSY      Past Medical History             Diagnosis Date    Decreased ambulation status       uses cane    Gastric reflux      Hypertension      Left cataract 16     for removal    Parkinson disease ([address]       tremors of hands/arm / left leg    Vitamin D deficiency           Past Surgical History             Procedure Laterality Date    CATARACT REMOVAL WITH IMPLANT Left 2016    HIP FRACTURE SURGERY Right 2015     ORIF left hip inter & subtrochanteric fx. w/long gamma nail. Ag Potter MD    HYSTERECTOMY   ?     robotic    KYPHOSIS SURGERY   2017     T8    KYPHOSIS SURGERY        TONSILLECTOMY        TOTAL KNEE ARTHROPLASTY Right 1997     Right TKA. Shena August. Brannon Phipps MD         Precautions: Falls, spinal neutral mechanics. BLE FWB (full weight bearing)  Equipment Needs: To be determined.      Patient lives children  in a two story home 1st floor etup  with 3 steps to enter with 1Rail  Bed is on 1 floor and bath is on 1 floor. Patient ambulated with rollator  PTA. Equipment owned: Rollator,         Initial Evaluation  Date: 6/10/19 Treatment  19 Short Term/ Long Term   Goals   AM-PAC 6 Clicks 99/13  47/     Was pt agreeable to Eval/treatment? yes       Does pt have pain? Discomfort 6/10 however improved as compared to prior to sx.   Reporting pain L side back pain.      Bed Mobility  Rolling: Min  Supine to sit: Min  Sit to supine: NT  Scooting: Min Rolling: Min  Supine to sit: Min  Sit to supine: NT  Scooting:   Rolling: Ind  Supine to sit:  Ind  Sit to supine: Ind  Scooting: Ind   Transfers Sit to stand: Min  Stand to sit: Min  Stand pivot: Min Sit to stand:

## 2019-06-11 NOTE — DISCHARGE SUMMARY
Physician Discharge Summary     Patient ID:  Jonathan Sims  [id number]  11 y.o.  1928    Admit date: 2019    Discharge date and time: 2019    Admission Diagnoses:   Patient Active Problem List   Diagnosis    [facility]trochan[facility] fracture of right femur [facility]    Parkinson's disease [facility]    Renal insufficiency    Hypertension    Abdominal pain    Chronic bilateral thoracic back pain    Thoracic compression fracture, closed, initial encounter [facility])       Discharge Diagnoses: t 12 compression fracture     Consults: ns     Procedures:  kypho of [facility]     Hospital Course: The patient is a 80 y.o. female of Catia Saini MD with significant past medical history of HTN, renal insufficiency, enlarged thyroid, previous compression fractures, parkinson's disease who presents with mid- back pain that started one week ago when the patient was reaching up and pushing a window screen into place. She felt \"an explosion in my back\". She saw her PCP and was given a medrol dose pack without improvement in her condition and then presented to the ER for care. Denies associated symptoms. Patient sees Dr Faith Bowden.   pt tolerated above procedure well. Denies any acute complains . Working with pt . She  will be discharged to [facility] for further rehab. No results for input(s): WBC, HGB, HCT, PLT in the last 72 hours. No results for input(s): NA, K, CL, CO2, BUN, CREATININE, CALCIUM in the last 72 hours. Invalid input(s): GLU    Ct Thoracic Spine Wo Contrast    Result Date: 2019  Patient MRN: [id number] : 1928 Age:  80 years Order Date: 2019 11:00 AM. Gender: Female Exam: CT THORACIC SPINE WO CONTRAST. Three-dimensional reconstructions were obtained by CT technician. Number of Images: 750 Indication:   Midline back pain. Contrast dosage: None Comparison: CT lumbar spine 2019. CT chest 2019. MRI thoracic spine 3/22/2019. Findings: This examination was performed on a CT scanner with dose reduction. into this region or near this region. T10, T11, L1 and L2 vertebral bodies are unremarkable and stable when compared with 5/31/2019. Vertebral body heights of T1-T7 are thought to be stable when compared with the previous exam. Vertebroplasty/kyphoplasty at T8 and T9 levels is unremarkable. No evident area of spinal canal stenosis are is seen throughout the visualized thoracic spine. Biapical pleural-parenchymal scarring and soft tissue thickening. No focal area of infiltrate/pneumonia. Posteriorly dependent atelectasis lung bases. Pulmonary nodule in the right lung base measured at approximately 0.53 cm anterior posterior by 0.51 cm transverse. Bilateral pleural effusions. ALERT:  THIS IS AN ABNORMAL REPORT 1. Acute/subacute compression fracture at what is designated as the T12 vertebral body, with approximately 40-50% loss of vertebral body height, worsened when compared with previous study, with interval development of air in the vertebral body at T12. Additionally, there is air noted in the prevertebral soft tissues extending from the approximate level of T10 vertebral body inferiorly to approximately the level of the superior T12 vertebral body in the retrocrural region. Findings are suspicious for possible infection given the lack of nitrogen gas phenomenon within either the T11-T12 or T12-L1 disc space. There is retropulsion of fracture fragments at the superior posterior aspect of T12 of approximately 0.44 cm in the spinal canal. 2. Osteopenia with other stable changes throughout the thoracic spine as described above, see above for details. 3. Abnormal extensive dilatation of the esophagus, tapering toward more normal distal esophagus, findings are nonspecific, achalasia could have the same appearance. An obstruction, esophageal well or stricture distally to have the same appearance. Surgical consultation/gastrointestinal consultation and evaluation recommended.  4. Retrosternal extension left thyroid evaluated clinically included esophagogram or upper endoscopy. 1. Acute compression fracture deformity of T12 with the significant loss of height of the related but centrally approximately 60%. 2. Protrusion of bone fragments from the posterior superior corner of T12 into the anterior spinal canal but not direct impresses on the distal thoracic spinal cord. 3. No signs of cord contusion. Mri Lumbar Spine W Wo Contrast    Result Date: 2019  Patient MRN:  [id number] : 1928 Age: 80 years Gender: Female Order Date:  2019 10:45 AM TECHNIQUE/NUMBER OF IMAGES/COMPARISON/CLINICAL HISTORY: MRI of the lumbar spine The study is part of the MRA of the thoracic spine. IV contrast was the same used for the MRI of the thoracic spine. T1-T2 and STIR sequences were obtained. T1 sequence done without and IV contrast. Clinical history trauma injury fracture of T12. Comparison with the previous CT lumbar spine May 31. Clinical history acute fracture of T12 vertebral body. FINDINGS: There is a an acute compression fracture deformity of T12 as described on the MRI of the thoracic spine with protrusion of bone fragments from the posterior superior corner of T12 towards anterior spinal canal but no significant degree of atherosclerosis of the cervical spine canal is seen. There is no evidence for edema or contusion of the distal thoracic spinal cord, conus medullaris and nerve roots. No abnormal postcontrast enhancement is seen in the contents of the thecal sac. Postcontrast enhancement is seen in the site of T12 to the fracture and including the proximal to the fragments and towards the pedicles bilaterally. No abnormal contrast enhancement is seen in the epidural space or in the vertebrae of the lumbar spine and upper sacral spine. No acute fractures are seen in the lumbar spine. Vertebroplasty of normal height. There is no bone marrow edema in the lumbar spine with bone marrow replacement process.  Some hypertrophy of the facet joints but there is no encroachments of the neural foramina. Mild broad posterior displacement is seen in the levels of L4-5 and minimal at L5-S1 but there is no spine canal stenosis. Incidentally noted is the presence of a distended gallbladder with multiple gallstones . No acute fractures or abnormal enhancements in the lumbar spine. Acute fracture of T12 vertebral body extending towards the pedicles all with abnormal enhancements as described on the MRI of the lumbar spine performed same time. Discharge Exam:    HEENT: NCAT,  PERRLA, No JVD  Heart:  RRR, no murmurs, gallops, or rubs. Lungs:  CTA bilaterally, no wheeze, rales or rhonchi  Abd: bowel sounds present, nontender, nondistended, no masses  Extrem:  No clubbing, cyanosis, or edema    Disposition: gurpreet    Patient Condition at Discharge: stable     Patient Instructions:      Medication List      START taking these medications    cyclobenzaprine 10 MG tablet  Commonly known as:  FLEXERIL  Take 1 tablet by mouth 3 times daily as needed for Muscle spasms     oxyCODONE-acetaminophen 5-325 MG per tablet  Commonly known as:  PERCOCET  Take 1 tablet by mouth every 4 hours as needed for Pain for up to 7 days. CONTINUE taking these medications    acetaminophen 500 MG tablet  Commonly known as:  APAP EXTRA STRENGTH  Take 2 tablets by mouth every 6 hours as needed for Pain     aspirin 81 MG tablet     bisacodyl 10 MG suppository  Commonly known as:  DULCOLAX     carbidopa-levodopa  MG per tablet  Commonly known as:  SINEMET     furosemide 20 MG tablet  Commonly known as:  LASIX     * SOOTHE XP Soln     * SOOTHE XP Soln     vitamin D3 1000 units Tabs         * This list has 2 medication(s) that are the same as other medications prescribed for you. Read the directions carefully, and ask your doctor or other care provider to review them with you.             STOP taking these medications    BOOST PLUS Liqd           Where to Get Your

## 2019-06-11 NOTE — ANESTHESIA POSTPROCEDURE EVALUATION
Department of Anesthesiology  Postprocedure Note    Patient: Rachel Granado  MRN: [id number]  YOB: 1928  Date of evaluation: 6/11/2019  Time:  9:02 AM     Procedure Summary     Date:  06/10/19 [place]  [facility] [place] [facility]    Anesthesia Start:  1209 Anesthesia Stop:  1257    Procedure:  T11 VERTEBRAL BODY BIOPSY & KYPHOPLASTY (N/A ) Diagnosis:  (.)    Surgeon:  Delmy Connor MD Responsible Provider:  Natalie Elam MD    Anesthesia Type:  general, MAC ASA Status:  4          Anesthesia Type: general, MAC    Bill Phase I: Bill Score: 10    Bill Phase II:      Last vitals: Reviewed and per EMR flowsheets.        Anesthesia Post Evaluation    Patient location during evaluation: PACU  Patient participation: complete - patient participated  Level of consciousness: awake and alert  Airway patency: patent  Nausea & Vomiting: no nausea and no vomiting  Complications: no  Cardiovascular status: hemodynamically stable and blood pressure returned to baseline  Respiratory status: acceptable  Hydration status: euvolemic

## 2019-06-14 LAB
EKG ATRIAL RATE: 81 BPM
EKG P AXIS: 59 DEGREES
EKG P-R INTERVAL: 152 MS
EKG Q-T INTERVAL: 390 MS
EKG QRS DURATION: 78 MS
EKG QTC CALCULATION (BAZETT): 453 MS
EKG R AXIS: -21 DEGREES
EKG T AXIS: 33 DEGREES
EKG VENTRICULAR RATE: 81 BPM

## 2019-06-26 ENCOUNTER — TELEPHONE (OUTPATIENT)
Dept: NEUROSURGERY | Age: 84
End: 2019-06-26

## 2019-06-26 DIAGNOSIS — M54.9 CHRONIC BILATERAL BACK PAIN, UNSPECIFIED BACK LOCATION: Primary | ICD-10-CM

## 2019-06-26 DIAGNOSIS — G89.29 CHRONIC BILATERAL BACK PAIN, UNSPECIFIED BACK LOCATION: Primary | ICD-10-CM

## 2019-06-26 NOTE — TELEPHONE ENCOUNTER
Facility requesting xray order. Patient is in constant pain in back and abdomen. She is taking Percocet 5-325 q4 prn.  Please fax order to [phone] or call [phone]

## 2019-07-19 ENCOUNTER — OFFICE VISIT (OUTPATIENT)
Dept: NEUROSURGERY | Age: 84
End: 2019-07-19

## 2019-07-19 VITALS — DIASTOLIC BLOOD PRESSURE: 52 MMHG | HEART RATE: 92 BPM | SYSTOLIC BLOOD PRESSURE: 113 MMHG

## 2019-07-19 DIAGNOSIS — Z87.81 H/O COMPRESSION FRACTURE OF SPINE: Primary | ICD-10-CM

## 2019-07-19 PROCEDURE — 99024 POSTOP FOLLOW-UP VISIT: CPT | Performed by: PHYSICIAN ASSISTANT

## 2019-07-19 RX ORDER — OXYCODONE HYDROCHLORIDE AND ACETAMINOPHEN 5; 325 MG/1; MG/1
1 TABLET ORAL EVERY 4 HOURS PRN
Status: ON HOLD | COMMUNITY
End: 2019-08-09 | Stop reason: SDUPTHER

## 2019-07-19 RX ORDER — CYCLOBENZAPRINE HCL 10 MG
10 TABLET ORAL 3 TIMES DAILY PRN
COMMUNITY
End: 2021-06-03

## 2019-07-19 NOTE — PATIENT INSTRUCTIONS

## 2019-07-26 DIAGNOSIS — Z87.81 H/O: COMPRESSION FRACTURE OF SPINE: Primary | ICD-10-CM

## 2019-08-01 ENCOUNTER — HOSPITAL ENCOUNTER (INPATIENT)
Age: 84
LOS: 6 days | Discharge: ANOTHER HOSPITAL | DRG: 351 | End: 2019-08-07
Attending: EMERGENCY MEDICINE | Admitting: INTERNAL MEDICINE
Payer: MEDICARE

## 2019-08-01 ENCOUNTER — ANESTHESIA (OUTPATIENT)
Dept: OPERATING ROOM | Age: 84
DRG: 351 | End: 2019-08-01
Payer: MEDICARE

## 2019-08-01 ENCOUNTER — ANESTHESIA EVENT (OUTPATIENT)
Dept: OPERATING ROOM | Age: 84
DRG: 351 | End: 2019-08-01
Payer: MEDICARE

## 2019-08-01 ENCOUNTER — APPOINTMENT (OUTPATIENT)
Dept: GENERAL RADIOLOGY | Age: 84
DRG: 351 | End: 2019-08-01
Payer: MEDICARE

## 2019-08-01 ENCOUNTER — APPOINTMENT (OUTPATIENT)
Dept: CT IMAGING | Age: 84
DRG: 351 | End: 2019-08-01
Payer: MEDICARE

## 2019-08-01 DIAGNOSIS — K56.609 SBO (SMALL BOWEL OBSTRUCTION) (HCC): Primary | ICD-10-CM

## 2019-08-01 LAB
ABO/RH: NORMAL
ALBUMIN SERPL-MCNC: 4.2 G/DL (ref 3.5–5.2)
ALP BLD-CCNC: 165 U/L (ref 35–104)
ALT SERPL-CCNC: 10 U/L (ref 0–32)
ANION GAP SERPL CALCULATED.3IONS-SCNC: 16 MMOL/L (ref 7–16)
ANTIBODY IDENTIFICATION: NORMAL
ANTIBODY SCREEN: NORMAL
AST SERPL-CCNC: 21 U/L (ref 0–31)
BACTERIA: ABNORMAL /HPF
BASOPHILS ABSOLUTE: 0.03 E9/L (ref 0–0.2)
BASOPHILS RELATIVE PERCENT: 0.2 % (ref 0–2)
BILIRUB SERPL-MCNC: 0.7 MG/DL (ref 0–1.2)
BILIRUBIN URINE: NEGATIVE
BLOOD, URINE: ABNORMAL
BUN BLDV-MCNC: 37 MG/DL (ref 8–23)
CALCIUM SERPL-MCNC: 10.4 MG/DL (ref 8.6–10.2)
CHLORIDE BLD-SCNC: 87 MMOL/L (ref 98–107)
CLARITY: CLEAR
CO2: 32 MMOL/L (ref 22–29)
COLOR: YELLOW
CREAT SERPL-MCNC: 1.1 MG/DL (ref 0.5–1)
DAT POLYSPECIFIC: NORMAL
DR. NOTIFY: NORMAL
EOSINOPHILS ABSOLUTE: 0 E9/L (ref 0.05–0.5)
EOSINOPHILS RELATIVE PERCENT: 0 % (ref 0–6)
EPITHELIAL CELLS, UA: ABNORMAL /HPF
GFR AFRICAN AMERICAN: 56
GFR NON-AFRICAN AMERICAN: 47 ML/MIN/1.73
GLUCOSE BLD-MCNC: 203 MG/DL (ref 74–99)
GLUCOSE URINE: NEGATIVE MG/DL
HCT VFR BLD CALC: 39 % (ref 34–48)
HEMOGLOBIN: 13.4 G/DL (ref 11.5–15.5)
IMMATURE GRANULOCYTES #: 0.07 E9/L
IMMATURE GRANULOCYTES %: 0.4 % (ref 0–5)
KETONES, URINE: NEGATIVE MG/DL
LACTIC ACID: 1 MMOL/L (ref 0.5–2.2)
LEUKOCYTE ESTERASE, URINE: ABNORMAL
LIPASE: 31 U/L (ref 13–60)
LYMPHOCYTES ABSOLUTE: 0.38 E9/L (ref 1.5–4)
LYMPHOCYTES RELATIVE PERCENT: 2.3 % (ref 20–42)
MCH RBC QN AUTO: 32.3 PG (ref 26–35)
MCHC RBC AUTO-ENTMCNC: 34.4 % (ref 32–34.5)
MCV RBC AUTO: 94 FL (ref 80–99.9)
MONOCYTES ABSOLUTE: 0.41 E9/L (ref 0.1–0.95)
MONOCYTES RELATIVE PERCENT: 2.4 % (ref 2–12)
NEUTROPHILS ABSOLUTE: 15.9 E9/L (ref 1.8–7.3)
NEUTROPHILS RELATIVE PERCENT: 94.7 % (ref 43–80)
NITRITE, URINE: NEGATIVE
PDW BLD-RTO: 14.9 FL (ref 11.5–15)
PH UA: 7 (ref 5–9)
PLATELET # BLD: 312 E9/L (ref 130–450)
PMV BLD AUTO: 11.5 FL (ref 7–12)
POTASSIUM REFLEX MAGNESIUM: 3.8 MMOL/L (ref 3.5–5)
PROTEIN UA: ABNORMAL MG/DL
RBC # BLD: 4.15 E12/L (ref 3.5–5.5)
RBC # BLD: NORMAL 10*6/UL
RBC UA: ABNORMAL /HPF (ref 0–2)
SODIUM BLD-SCNC: 135 MMOL/L (ref 132–146)
SPECIFIC GRAVITY UA: <=1.005 (ref 1–1.03)
TOTAL PROTEIN: 7.5 G/DL (ref 6.4–8.3)
UROBILINOGEN, URINE: 0.2 E.U./DL
WBC # BLD: 16.8 E9/L (ref 4.5–11.5)
WBC UA: ABNORMAL /HPF (ref 0–5)

## 2019-08-01 PROCEDURE — 83605 ASSAY OF LACTIC ACID: CPT

## 2019-08-01 PROCEDURE — 1200000000 HC SEMI PRIVATE

## 2019-08-01 PROCEDURE — 86901 BLOOD TYPING SEROLOGIC RH(D): CPT

## 2019-08-01 PROCEDURE — 2580000003 HC RX 258: Performed by: INTERNAL MEDICINE

## 2019-08-01 PROCEDURE — 86900 BLOOD TYPING SEROLOGIC ABO: CPT

## 2019-08-01 PROCEDURE — 2580000003 HC RX 258: Performed by: RADIOLOGY

## 2019-08-01 PROCEDURE — 74018 RADEX ABDOMEN 1 VIEW: CPT

## 2019-08-01 PROCEDURE — 6360000004 HC RX CONTRAST MEDICATION: Performed by: RADIOLOGY

## 2019-08-01 PROCEDURE — 80053 COMPREHEN METABOLIC PANEL: CPT

## 2019-08-01 PROCEDURE — 96365 THER/PROPH/DIAG IV INF INIT: CPT

## 2019-08-01 PROCEDURE — 86870 RBC ANTIBODY IDENTIFICATION: CPT

## 2019-08-01 PROCEDURE — 2500000003 HC RX 250 WO HCPCS: Performed by: EMERGENCY MEDICINE

## 2019-08-01 PROCEDURE — 96375 TX/PRO/DX INJ NEW DRUG ADDON: CPT

## 2019-08-01 PROCEDURE — 86850 RBC ANTIBODY SCREEN: CPT

## 2019-08-01 PROCEDURE — 36415 COLL VENOUS BLD VENIPUNCTURE: CPT

## 2019-08-01 PROCEDURE — 99285 EMERGENCY DEPT VISIT HI MDM: CPT

## 2019-08-01 PROCEDURE — 6360000002 HC RX W HCPCS: Performed by: EMERGENCY MEDICINE

## 2019-08-01 PROCEDURE — 86880 COOMBS TEST DIRECT: CPT

## 2019-08-01 PROCEDURE — 81001 URINALYSIS AUTO W/SCOPE: CPT

## 2019-08-01 PROCEDURE — 74177 CT ABD & PELVIS W/CONTRAST: CPT

## 2019-08-01 PROCEDURE — 86922 COMPATIBILITY TEST ANTIGLOB: CPT

## 2019-08-01 PROCEDURE — 83690 ASSAY OF LIPASE: CPT

## 2019-08-01 PROCEDURE — 6360000002 HC RX W HCPCS: Performed by: INTERNAL MEDICINE

## 2019-08-01 PROCEDURE — 85025 COMPLETE CBC W/AUTO DIFF WBC: CPT

## 2019-08-01 PROCEDURE — 93005 ELECTROCARDIOGRAM TRACING: CPT | Performed by: STUDENT IN AN ORGANIZED HEALTH CARE EDUCATION/TRAINING PROGRAM

## 2019-08-01 PROCEDURE — 2580000003 HC RX 258: Performed by: EMERGENCY MEDICINE

## 2019-08-01 RX ORDER — 0.9 % SODIUM CHLORIDE 0.9 %
1000 INTRAVENOUS SOLUTION INTRAVENOUS ONCE
Status: COMPLETED | OUTPATIENT
Start: 2019-08-01 | End: 2019-08-01

## 2019-08-01 RX ORDER — ONDANSETRON 2 MG/ML
4 INJECTION INTRAMUSCULAR; INTRAVENOUS ONCE
Status: COMPLETED | OUTPATIENT
Start: 2019-08-01 | End: 2019-08-01

## 2019-08-01 RX ORDER — SODIUM CHLORIDE 0.9 % (FLUSH) 0.9 %
10 SYRINGE (ML) INJECTION 2 TIMES DAILY
Status: DISCONTINUED | OUTPATIENT
Start: 2019-08-01 | End: 2019-08-07 | Stop reason: HOSPADM

## 2019-08-01 RX ORDER — DIMETHICONE, OXYBENZONE, AND PADIMATE O 2; 2.5; 6.6 G/100G; G/100G; G/100G
STICK TOPICAL PRN
Status: DISCONTINUED | OUTPATIENT
Start: 2019-08-01 | End: 2019-08-07 | Stop reason: HOSPADM

## 2019-08-01 RX ORDER — MORPHINE SULFATE 4 MG/ML
4 INJECTION, SOLUTION INTRAMUSCULAR; INTRAVENOUS ONCE
Status: COMPLETED | OUTPATIENT
Start: 2019-08-01 | End: 2019-08-01

## 2019-08-01 RX ORDER — ONDANSETRON 2 MG/ML
4 INJECTION INTRAMUSCULAR; INTRAVENOUS EVERY 6 HOURS PRN
Status: DISCONTINUED | OUTPATIENT
Start: 2019-08-01 | End: 2019-08-07 | Stop reason: HOSPADM

## 2019-08-01 RX ORDER — SODIUM CHLORIDE 9 MG/ML
INJECTION, SOLUTION INTRAVENOUS CONTINUOUS
Status: DISCONTINUED | OUTPATIENT
Start: 2019-08-01 | End: 2019-08-02

## 2019-08-01 RX ADMIN — ONDANSETRON 4 MG: 2 INJECTION INTRAMUSCULAR; INTRAVENOUS at 03:34

## 2019-08-01 RX ADMIN — Medication 10 ML: at 21:00

## 2019-08-01 RX ADMIN — METRONIDAZOLE 500 MG: 500 INJECTION, SOLUTION INTRAVENOUS at 06:00

## 2019-08-01 RX ADMIN — IOPAMIDOL 100 ML: 755 INJECTION, SOLUTION INTRAVENOUS at 03:26

## 2019-08-01 RX ADMIN — MORPHINE SULFATE 4 MG: 4 INJECTION, SOLUTION INTRAMUSCULAR; INTRAVENOUS at 03:35

## 2019-08-01 RX ADMIN — Medication 10 ML: at 03:23

## 2019-08-01 RX ADMIN — SODIUM CHLORIDE: 9 INJECTION, SOLUTION INTRAVENOUS at 07:43

## 2019-08-01 RX ADMIN — CEFTRIAXONE 1 G: 1 INJECTION, POWDER, FOR SOLUTION INTRAMUSCULAR; INTRAVENOUS at 05:12

## 2019-08-01 RX ADMIN — ENOXAPARIN SODIUM 40 MG: 40 INJECTION SUBCUTANEOUS at 09:16

## 2019-08-01 RX ADMIN — SODIUM CHLORIDE 1000 ML: 9 INJECTION, SOLUTION INTRAVENOUS at 05:12

## 2019-08-01 ASSESSMENT — PAIN DESCRIPTION - LOCATION: LOCATION: ABDOMEN

## 2019-08-01 ASSESSMENT — PAIN SCALES - GENERAL
PAINLEVEL_OUTOF10: 6
PAINLEVEL_OUTOF10: 3
PAINLEVEL_OUTOF10: 0
PAINLEVEL_OUTOF10: 5

## 2019-08-01 ASSESSMENT — PAIN DESCRIPTION - PROGRESSION: CLINICAL_PROGRESSION: GRADUALLY IMPROVING

## 2019-08-01 ASSESSMENT — PAIN DESCRIPTION - DESCRIPTORS: DESCRIPTORS: DISCOMFORT

## 2019-08-01 ASSESSMENT — PAIN DESCRIPTION - PAIN TYPE
TYPE: ACUTE PAIN
TYPE: ACUTE PAIN

## 2019-08-01 NOTE — ANESTHESIA PRE PROCEDURE
(DEMEROL) injection 12.5 mg  12.5 mg Intravenous Q5 Min PRN Gilberto Vu MD        diphenhydrAMINE (BENADRYL) injection 12.5 mg  12.5 mg Intravenous Once PRN Gilberto Vu MD        HYDROmorphone (DILAUDID) injection 0.25 mg  0.25 mg Intravenous Q5 Min PRN Gilberto Vu MD        HYDROmorphone (DILAUDID) injection 0.1 mg  0.1 mg Intravenous Q5 Min PRN Gilberto Vu MD        ceFAZolin (ANCEF) 2 g in dextrose 5 % 100 mL IVPB  2 g Intravenous See Admin Instructions Cynthia Hanson MD        acetaminophen (TYLENOL) tablet 650 mg  650 mg Oral Q4H PRN Willy Bee, DO   650 mg at 08/04/19 2343    enoxaparin (LOVENOX) injection 30 mg  30 mg Subcutaneous Daily Jose Pruitt DO   30 mg at 08/04/19 1986    lactated ringers infusion   Intravenous Continuous Veronia Snellen, DO 75 mL/hr at 08/02/19 2118      diltiazem 100 mg in dextrose 5 % 100 mL infusion  5 mg/hr Intravenous Continuous Jose Pruitt, DO 2.5 mL/hr at 08/05/19 0345 2.5 mg/hr at 08/05/19 0345    perflutren lipid microspheres (DEFINITY) injection 1.65 mg  1.5 mL Intravenous ONCE PRN Jose Pruitt, DO        medicated lip balm (BLISTEX/CARMEX) stick   Topical PRN Jose Pruitt, DO        sodium chloride flush 0.9 % injection 10 mL  10 mL Intravenous BID Jose Pruitt DO   10 mL at 08/02/19 0916    magnesium hydroxide (MILK OF MAGNESIA) 400 MG/5ML suspension 30 mL  30 mL Oral Daily PRN Jose Robertsves, DO        ondansetron [facility]) injection 4 mg  4 mg Intravenous Q6H PRN Jose Pruitt, DO           Allergies:     Allergies   Allergen Reactions    Pcn [Penicillins] Itching       Problem List:    Patient Active Problem List   Diagnosis Code    Subtrochanteric fracture of right femur ([facility] S72.21XA    Parkinson's disease ([facility] G20    Renal insufficiency N28.9    Hypertension I10    Abdominal pain R10.9    Chronic bilateral thoracic back pain M54.6, G89.29    Thoracic compression fracture, closed, initial encounter ([facility]

## 2019-08-01 NOTE — H&P
History and Physical      CHIEF COMPLAINT:  abdominal pain       HISTORY OF PRESENT ILLNESS:      The patient is a 80 y.o. female patient of Dr Kasie Lutz who presents with abdominal pain. She resides a local nursing home. She began to develop abdominal pain on the day prior to admission. The pain was located in the upper abdomen and was associated with nausea and radiated into both upper quadrants. He also complains of back pain but this is chronic related to previous compression fracture. She was admitted 2 years ago with abdominal pain and seen by general surgery. Eventually was determined to have a compression fracture and underwent kyphoplasty with good results. She was well until this spring when she developed a recurrent compression fracture. She underwent kyphoplasty on 6/10. Patient continues to complain of back pain as well as abdominal pain and was prescribed Percocet. No fever chills vomiting or diarrhea reported. 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 Nasogastric tube was placed and she was admitted for further evaluation. Currently she is resting quietly and her only complaint is back pain. Past Medical History:    Past Medical History:   Diagnosis Date    Decreased ambulation status     uses cane    Gastric reflux     Hypertension     Left cataract 8-11-16    for removal    Parkinson disease (HCC)     tremors of hands/arm / left leg    Vitamin D deficiency        Past Surgical History:    Past Surgical History:   Procedure Laterality Date    CATARACT REMOVAL WITH IMPLANT Left 12/06/2016    HIP FRACTURE SURGERY Right 11/16/2015    ORIF left hip inter & subtrochanteric fx. w/long gamma nail. Cresencio Camacho MD    HYSTERECTOMY  2011?     robotic    KYPHOSIS SURGERY  01/11/2017    T8    KYPHOSIS SURGERY      KYPHOSIS SURGERY N/A 6/10/2019    T11 VERTEBRAL BODY BIOPSY & KYPHOPLASTY performed by Slava James

## 2019-08-01 NOTE — PROGRESS NOTES
[facility] Quality Flow/Interdisciplinary Rounds Progress Note        Quality Flow Rounds held on August 1, 2019    Disciplines Attending:  Bedside Nurse, ,  and Nursing Unit Leadership    Jonathan Sims was admitted on 8/1/2019  2:16 AM    Anticipated Discharge Date:  Expected Discharge Date: 08/04/19    Disposition:    Trey Score:  Trey Scale Score: 17    Readmission Risk              Risk of Unplanned Readmission:        13           Discussed patient goal for the day, patient clinical progression, and barriers to discharge.   The following Goal(s) of the Day/Commitment(s) have been identified:  await Dr. Wilson [facility] orders      Eloise De Oliveira  August 1, 2019

## 2019-08-01 NOTE — ED PROVIDER NOTES
Department of Emergency Medicine   ED  Provider Note  Admit Date/RoomTime: 8/1/2019  2:16 AM  ED Room: 0530/0530-A      History of Present Illness:  8/1/19, Time: 3:42 AM         Army Kyle is a 80 y.o. female presenting to the ED for abdominal pain, beginning 1 day ago. The complaint has been persistent, mild in severity, and worsened by nothing. Pt with history of HTN and gastric reflux presents with abdominal pain that started yesterday. She states that she feels more bloated tonight and the pain has gotten worse. She also complains of nausea. Her last BM with suppository was this morning. Pt denies fever, lightheadedness, hematochezia, vomiting, diarrhea, dysuria, constipation, vaginal bleeding or discharge, fever, headache, cough. Review of Systems:   Pertinent positives and negatives are stated within HPI, all other systems reviewed and are negative.    --------------------------------------------- PAST HISTORY ---------------------------------------------  Past Medical History:  has a past medical history of Decreased ambulation status, Gastric reflux, Hypertension, Left cataract, Parkinson disease ([facility] [address] and Vitamin D deficiency. Past Surgical History:  has a past surgical history that includes Hip fracture surgery (Right, 11/16/2015); Hysterectomy (2011?); Tonsillectomy; Cataract removal with implant (Left, 12/06/2016); Total knee arthroplasty (Right, 01/16/1997); Kyphosis surgery (01/11/2017); Kyphosis surgery; and Kyphosis surgery (N/A, 6/10/2019). Social History:  reports that she has never smoked. She has never used smokeless tobacco. She reports that she does not drink alcohol or use drugs. Family History: family history is not on file. The patients home medications have been reviewed.     Allergies: Pcn [penicillins]    ---------------------------------------------------PHYSICAL EXAM--------------------------------------    Constitutional/General: Alert and oriented x3, well appearing, non toxic in NAD  Head: Normocephalic and atraumatic  Eyes: PERRL, EOMI, conjunctiva normal, sclera non icteric  Mouth: Oropharynx clear  Neck: Supple  Respiratory: Lungs clear to auscultation bilaterally, no wheezes, rales, or rhonchi. Not in respiratory distress  Cardiovascular:  Regular rate. Regular rhythm. No murmurs, gallops, or rubs. 2+ distal pulses  Chest: No chest wall tenderness  GI:  Abdomen Soft, Abdominal tenderness, Distended. -BS. No rebound, guarding, or rigidity. No pulsatile masses. Musculoskeletal: Moves all extremities x 4. Warm and well perfused, no clubbing, cyanosis, or edema. Integument: Skin warm and dry. No rashes. Neurologic: GCS 15, no focal deficits, symmetric strength 5/5 in the upper and lower extremities bilaterally  Psychiatric: Normal Affect    -------------------------------------------------- RESULTS -------------------------------------------------  I have personally reviewed all laboratory and imaging results for this patient. Results are listed below.      LABS:  Results for orders placed or performed during the hospital encounter of 08/01/19   CBC Auto Differential   Result Value Ref Range    WBC 16.8 (H) 4.5 - 11.5 E9/L    RBC 4.15 3.50 - 5.50 E12/L    Hemoglobin 13.4 11.5 - 15.5 g/dL    Hematocrit 39.0 34.0 - 48.0 %    MCV 94.0 80.0 - 99.9 fL    MCH 32.3 26.0 - 35.0 pg    MCHC 34.4 32.0 - 34.5 %    RDW 14.9 11.5 - 15.0 fL    Platelets 850 651 - 177 E9/L    MPV 11.5 7.0 - 12.0 fL    Neutrophils % 94.7 (H) 43.0 - 80.0 %    Immature Granulocytes % 0.4 0.0 - 5.0 %    Lymphocytes % 2.3 (L) 20.0 - 42.0 %    Monocytes % 2.4 2.0 - 12.0 %    Eosinophils % 0.0 0.0 - 6.0 %    Basophils % 0.2 0.0 - 2.0 %    Neutrophils # 15.90 (H) 1.80 - 7.30 E9/L    Immature Granulocytes # 0.07 E9/L    Lymphocytes # 0.38 (L) 1.50 - 4.00 E9/L    Monocytes # 0.41 0.10 - 0.95 E9/L    Eosinophils # 0.00 (L) 0.05 - 0.50 E9/L    Basophils # 0.03 0.00 - 0.20 E9/L    RBC Morphology Normal with liver function testing is recommended to determine if an MRCP    may be indicated. 3. Cardiomegaly. 4. Additional nonacute findings as described above. THIS REPORT CONTAINS FINDINGS THAT MAY BE CRITICAL TO PATIENT CARE. The    findings were verbally communicated via telephone conference with Wayne David at 4:30 AM EDT on 8/1/2019. The findings were acknowledged and    understood. This report has been electronically signed by Yahaira Au MD.            ------------------------- NURSING NOTES AND VITALS REVIEWED ---------------------------   The nursing notes within the ED encounter and vital signs as below have been reviewed by myself. BP (!) 130/94   Pulse 97   Temp 98.1 °F (36.7 °C) (Oral)   Resp 16   Ht 5' 2\" (1.575 m)   Wt 92 lb 4.8 oz (41.9 kg)   SpO2 97%   BMI 16.88 kg/m²   Oxygen Saturation Interpretation: Normal    The patients available past medical records and past encounters were reviewed.       ------------------------------ ED COURSE/MEDICAL DECISION MAKING----------------------  Medications   medicated lip balm (BLISTEX/CARMEX) stick (has no administration in time range)   sodium chloride flush 0.9 % injection 10 mL (10 mLs Intravenous Given 8/1/19 0323)   magnesium hydroxide (MILK OF MAGNESIA) 400 MG/5ML suspension 30 mL (has no administration in time range)   ondansetron (ZOFRAN) injection 4 mg (has no administration in time range)   enoxaparin (LOVENOX) injection 40 mg (has no administration in time range)   0.9 % sodium chloride infusion (has no administration in time range)   iopamidol (ISOVUE-370) 76 % injection 100 mL (100 mLs Intravenous Given 8/1/19 9346)   morphine sulfate (PF) injection 4 mg (4 mg Intravenous Given 8/1/19 9754)   ondansetron (ZOFRAN) injection 4 mg (4 mg Intravenous Given 8/1/19 0334)   cefTRIAXone (ROCEPHIN) 1 g in dextrose 5 % 50 mL IVPB (vial-mate) (0 g Intravenous Stopped 8/1/19 0600)   metronidazole (FLAGYL) 500 mg in NaCl

## 2019-08-02 LAB
ANION GAP SERPL CALCULATED.3IONS-SCNC: 10 MMOL/L (ref 7–16)
BASOPHILS ABSOLUTE: 0.03 E9/L (ref 0–0.2)
BASOPHILS RELATIVE PERCENT: 0.4 % (ref 0–2)
BUN BLDV-MCNC: 26 MG/DL (ref 8–23)
CALCIUM SERPL-MCNC: 8.8 MG/DL (ref 8.6–10.2)
CHLORIDE BLD-SCNC: 107 MMOL/L (ref 98–107)
CO2: 31 MMOL/L (ref 22–29)
CREAT SERPL-MCNC: 1 MG/DL (ref 0.5–1)
EOSINOPHILS ABSOLUTE: 0.12 E9/L (ref 0.05–0.5)
EOSINOPHILS RELATIVE PERCENT: 1.7 % (ref 0–6)
GFR AFRICAN AMERICAN: >60
GFR NON-AFRICAN AMERICAN: 52 ML/MIN/1.73
GLUCOSE BLD-MCNC: 84 MG/DL (ref 74–99)
HCT VFR BLD CALC: 30 % (ref 34–48)
HEMOGLOBIN: 9.8 G/DL (ref 11.5–15.5)
IMMATURE GRANULOCYTES #: 0.03 E9/L
IMMATURE GRANULOCYTES %: 0.4 % (ref 0–5)
LV EF: 60 %
LVEF MODALITY: NORMAL
LYMPHOCYTES ABSOLUTE: 1.11 E9/L (ref 1.5–4)
LYMPHOCYTES RELATIVE PERCENT: 15.9 % (ref 20–42)
MAGNESIUM: 2.2 MG/DL (ref 1.6–2.6)
MCH RBC QN AUTO: 32.3 PG (ref 26–35)
MCHC RBC AUTO-ENTMCNC: 32.7 % (ref 32–34.5)
MCV RBC AUTO: 99 FL (ref 80–99.9)
MONOCYTES ABSOLUTE: 0.61 E9/L (ref 0.1–0.95)
MONOCYTES RELATIVE PERCENT: 8.7 % (ref 2–12)
NEUTROPHILS ABSOLUTE: 5.1 E9/L (ref 1.8–7.3)
NEUTROPHILS RELATIVE PERCENT: 72.9 % (ref 43–80)
PDW BLD-RTO: 15.6 FL (ref 11.5–15)
PLATELET # BLD: 215 E9/L (ref 130–450)
PMV BLD AUTO: 10.7 FL (ref 7–12)
POTASSIUM REFLEX MAGNESIUM: 3.5 MMOL/L (ref 3.5–5)
RBC # BLD: 3.03 E12/L (ref 3.5–5.5)
SODIUM BLD-SCNC: 148 MMOL/L (ref 132–146)
WBC # BLD: 7 E9/L (ref 4.5–11.5)

## 2019-08-02 PROCEDURE — 93005 ELECTROCARDIOGRAM TRACING: CPT | Performed by: INTERNAL MEDICINE

## 2019-08-02 PROCEDURE — 2580000003 HC RX 258: Performed by: INTERNAL MEDICINE

## 2019-08-02 PROCEDURE — 80048 BASIC METABOLIC PNL TOTAL CA: CPT

## 2019-08-02 PROCEDURE — 85025 COMPLETE CBC W/AUTO DIFF WBC: CPT

## 2019-08-02 PROCEDURE — 2500000003 HC RX 250 WO HCPCS: Performed by: INTERNAL MEDICINE

## 2019-08-02 PROCEDURE — 36415 COLL VENOUS BLD VENIPUNCTURE: CPT

## 2019-08-02 PROCEDURE — 6360000002 HC RX W HCPCS: Performed by: INTERNAL MEDICINE

## 2019-08-02 PROCEDURE — 83735 ASSAY OF MAGNESIUM: CPT

## 2019-08-02 PROCEDURE — 2060000000 HC ICU INTERMEDIATE R&B

## 2019-08-02 PROCEDURE — 2580000003 HC RX 258: Performed by: STUDENT IN AN ORGANIZED HEALTH CARE EDUCATION/TRAINING PROGRAM

## 2019-08-02 PROCEDURE — 86902 BLOOD TYPE ANTIGEN DONOR EA: CPT

## 2019-08-02 PROCEDURE — APPSS60 APP SPLIT SHARED TIME 46-60 MINUTES: Performed by: NURSE PRACTITIONER

## 2019-08-02 PROCEDURE — 93306 TTE W/DOPPLER COMPLETE: CPT

## 2019-08-02 PROCEDURE — 99223 1ST HOSP IP/OBS HIGH 75: CPT | Performed by: INTERNAL MEDICINE

## 2019-08-02 RX ORDER — SODIUM CHLORIDE, SODIUM LACTATE, POTASSIUM CHLORIDE, CALCIUM CHLORIDE 600; 310; 30; 20 MG/100ML; MG/100ML; MG/100ML; MG/100ML
INJECTION, SOLUTION INTRAVENOUS CONTINUOUS
Status: DISCONTINUED | OUTPATIENT
Start: 2019-08-02 | End: 2019-08-06

## 2019-08-02 RX ORDER — DILTIAZEM HYDROCHLORIDE 5 MG/ML
10 INJECTION INTRAVENOUS ONCE
Status: COMPLETED | OUTPATIENT
Start: 2019-08-02 | End: 2019-08-02

## 2019-08-02 RX ADMIN — SODIUM CHLORIDE, POTASSIUM CHLORIDE, SODIUM LACTATE AND CALCIUM CHLORIDE: 600; 310; 30; 20 INJECTION, SOLUTION INTRAVENOUS at 06:49

## 2019-08-02 RX ADMIN — DILTIAZEM HYDROCHLORIDE 10 MG/HR: 5 INJECTION INTRAVENOUS at 23:06

## 2019-08-02 RX ADMIN — ENOXAPARIN SODIUM 30 MG: 30 INJECTION SUBCUTANEOUS at 09:16

## 2019-08-02 RX ADMIN — DILTIAZEM HYDROCHLORIDE 5 MG/HR: 5 INJECTION INTRAVENOUS at 11:19

## 2019-08-02 RX ADMIN — Medication 10 ML: at 09:16

## 2019-08-02 RX ADMIN — SODIUM CHLORIDE, POTASSIUM CHLORIDE, SODIUM LACTATE AND CALCIUM CHLORIDE: 600; 310; 30; 20 INJECTION, SOLUTION INTRAVENOUS at 21:18

## 2019-08-02 RX ADMIN — DILTIAZEM HYDROCHLORIDE 10 MG: 5 INJECTION INTRAVENOUS at 11:16

## 2019-08-02 ASSESSMENT — PAIN SCALES - GENERAL
PAINLEVEL_OUTOF10: 0

## 2019-08-02 NOTE — PROGRESS NOTES
Alysa Moreno,    Your patient is on a medication that requires a renal dose adjustment. Renal Function Assessment:    Date Body Weight IBW Adj. Body Weight Scr CrCl Dialysis status   8/2/2019 38.8 kg 50.1 kg   1 22 ml/min         Pharmacy has renally dose-adjusted the following medication(s):    Date Medication Original Dosing Regimen New Dosing Regimen   08/01/2019 Enoxaparin 40 mg daily 30 mg daily           These changes were made per protocol according to the [facility] Renal Function-Based Dose Adjustments Policy    *Please note this dose may need readjusted if your patient's renal function significantly improves. Please contact pharmacy with any questions regarding these changes.

## 2019-08-02 NOTE — PROGRESS NOTES
Nurse to nurse report called and given to [facility] on 4W. Pt belongings packed, daughter at bedside. On telemetry monitor. Cardiology consult called in by charge nurse.

## 2019-08-03 LAB
ANION GAP SERPL CALCULATED.3IONS-SCNC: 12 MMOL/L (ref 7–16)
BASOPHILS ABSOLUTE: 0.03 E9/L (ref 0–0.2)
BASOPHILS RELATIVE PERCENT: 0.3 % (ref 0–2)
BUN BLDV-MCNC: 25 MG/DL (ref 8–23)
CALCIUM SERPL-MCNC: 8.8 MG/DL (ref 8.6–10.2)
CHLORIDE BLD-SCNC: 102 MMOL/L (ref 98–107)
CO2: 29 MMOL/L (ref 22–29)
CREAT SERPL-MCNC: 1 MG/DL (ref 0.5–1)
EKG ATRIAL RATE: 108 BPM
EKG ATRIAL RATE: 99 BPM
EKG P AXIS: 59 DEGREES
EKG P-R INTERVAL: 164 MS
EKG Q-T INTERVAL: 284 MS
EKG Q-T INTERVAL: 366 MS
EKG QRS DURATION: 68 MS
EKG QRS DURATION: 76 MS
EKG QTC CALCULATION (BAZETT): 445 MS
EKG QTC CALCULATION (BAZETT): 469 MS
EKG R AXIS: -11 DEGREES
EKG R AXIS: -27 DEGREES
EKG T AXIS: -12 DEGREES
EKG T AXIS: 3 DEGREES
EKG VENTRICULAR RATE: 148 BPM
EKG VENTRICULAR RATE: 99 BPM
EOSINOPHILS ABSOLUTE: 0.13 E9/L (ref 0.05–0.5)
EOSINOPHILS RELATIVE PERCENT: 1.4 % (ref 0–6)
GFR AFRICAN AMERICAN: >60
GFR NON-AFRICAN AMERICAN: 52 ML/MIN/1.73
GLUCOSE BLD-MCNC: 107 MG/DL (ref 74–99)
HCT VFR BLD CALC: 31.4 % (ref 34–48)
HEMOGLOBIN: 9.8 G/DL (ref 11.5–15.5)
IMMATURE GRANULOCYTES #: 0.03 E9/L
IMMATURE GRANULOCYTES %: 0.3 % (ref 0–5)
LYMPHOCYTES ABSOLUTE: 1.31 E9/L (ref 1.5–4)
LYMPHOCYTES RELATIVE PERCENT: 13.7 % (ref 20–42)
MAGNESIUM: 2 MG/DL (ref 1.6–2.6)
MCH RBC QN AUTO: 31.3 PG (ref 26–35)
MCHC RBC AUTO-ENTMCNC: 31.2 % (ref 32–34.5)
MCV RBC AUTO: 100.3 FL (ref 80–99.9)
MONOCYTES ABSOLUTE: 0.87 E9/L (ref 0.1–0.95)
MONOCYTES RELATIVE PERCENT: 9.1 % (ref 2–12)
NEUTROPHILS ABSOLUTE: 7.21 E9/L (ref 1.8–7.3)
NEUTROPHILS RELATIVE PERCENT: 75.2 % (ref 43–80)
PDW BLD-RTO: 15.5 FL (ref 11.5–15)
PLATELET # BLD: 246 E9/L (ref 130–450)
PMV BLD AUTO: 11.2 FL (ref 7–12)
POTASSIUM REFLEX MAGNESIUM: 3.3 MMOL/L (ref 3.5–5)
RBC # BLD: 3.13 E12/L (ref 3.5–5.5)
SODIUM BLD-SCNC: 143 MMOL/L (ref 132–146)
WBC # BLD: 9.6 E9/L (ref 4.5–11.5)

## 2019-08-03 PROCEDURE — 2580000003 HC RX 258: Performed by: INTERNAL MEDICINE

## 2019-08-03 PROCEDURE — 2500000003 HC RX 250 WO HCPCS: Performed by: INTERNAL MEDICINE

## 2019-08-03 PROCEDURE — 36415 COLL VENOUS BLD VENIPUNCTURE: CPT

## 2019-08-03 PROCEDURE — 85025 COMPLETE CBC W/AUTO DIFF WBC: CPT

## 2019-08-03 PROCEDURE — 80048 BASIC METABOLIC PNL TOTAL CA: CPT

## 2019-08-03 PROCEDURE — 93010 ELECTROCARDIOGRAM REPORT: CPT | Performed by: INTERNAL MEDICINE

## 2019-08-03 PROCEDURE — 2060000000 HC ICU INTERMEDIATE R&B

## 2019-08-03 PROCEDURE — 6370000000 HC RX 637 (ALT 250 FOR IP): Performed by: INTERNAL MEDICINE

## 2019-08-03 PROCEDURE — 83735 ASSAY OF MAGNESIUM: CPT

## 2019-08-03 PROCEDURE — 6360000002 HC RX W HCPCS: Performed by: INTERNAL MEDICINE

## 2019-08-03 PROCEDURE — 99232 SBSQ HOSP IP/OBS MODERATE 35: CPT | Performed by: INTERNAL MEDICINE

## 2019-08-03 RX ORDER — POTASSIUM CHLORIDE 20 MEQ/1
40 TABLET, EXTENDED RELEASE ORAL ONCE
Status: COMPLETED | OUTPATIENT
Start: 2019-08-03 | End: 2019-08-03

## 2019-08-03 RX ORDER — ACETAMINOPHEN 325 MG/1
650 TABLET ORAL EVERY 4 HOURS PRN
Status: DISCONTINUED | OUTPATIENT
Start: 2019-08-03 | End: 2019-08-07 | Stop reason: HOSPADM

## 2019-08-03 RX ADMIN — POTASSIUM CHLORIDE 40 MEQ: 20 TABLET, EXTENDED RELEASE ORAL at 13:49

## 2019-08-03 RX ADMIN — DILTIAZEM HYDROCHLORIDE 10 MG/HR: 5 INJECTION INTRAVENOUS at 17:33

## 2019-08-03 RX ADMIN — ACETAMINOPHEN 650 MG: 325 TABLET ORAL at 02:29

## 2019-08-03 RX ADMIN — DILTIAZEM HYDROCHLORIDE 10 MG/HR: 5 INJECTION INTRAVENOUS at 07:34

## 2019-08-03 RX ADMIN — ENOXAPARIN SODIUM 30 MG: 30 INJECTION SUBCUTANEOUS at 10:00

## 2019-08-03 ASSESSMENT — PAIN SCALES - GENERAL
PAINLEVEL_OUTOF10: 4
PAINLEVEL_OUTOF10: 0
PAINLEVEL_OUTOF10: 0

## 2019-08-04 LAB
ANION GAP SERPL CALCULATED.3IONS-SCNC: 9 MMOL/L (ref 7–16)
BASOPHILS ABSOLUTE: 0.02 E9/L (ref 0–0.2)
BASOPHILS RELATIVE PERCENT: 0.2 % (ref 0–2)
BUN BLDV-MCNC: 15 MG/DL (ref 8–23)
CALCIUM SERPL-MCNC: 8.8 MG/DL (ref 8.6–10.2)
CHLORIDE BLD-SCNC: 102 MMOL/L (ref 98–107)
CO2: 28 MMOL/L (ref 22–29)
CREAT SERPL-MCNC: 0.9 MG/DL (ref 0.5–1)
EOSINOPHILS ABSOLUTE: 0.21 E9/L (ref 0.05–0.5)
EOSINOPHILS RELATIVE PERCENT: 2.1 % (ref 0–6)
GFR AFRICAN AMERICAN: >60
GFR NON-AFRICAN AMERICAN: 59 ML/MIN/1.73
GLUCOSE BLD-MCNC: 137 MG/DL (ref 74–99)
HCT VFR BLD CALC: 30.3 % (ref 34–48)
HEMOGLOBIN: 9.6 G/DL (ref 11.5–15.5)
IMMATURE GRANULOCYTES #: 0.05 E9/L
IMMATURE GRANULOCYTES %: 0.5 % (ref 0–5)
LYMPHOCYTES ABSOLUTE: 1.32 E9/L (ref 1.5–4)
LYMPHOCYTES RELATIVE PERCENT: 13.2 % (ref 20–42)
MCH RBC QN AUTO: 31.2 PG (ref 26–35)
MCHC RBC AUTO-ENTMCNC: 31.7 % (ref 32–34.5)
MCV RBC AUTO: 98.4 FL (ref 80–99.9)
MONOCYTES ABSOLUTE: 0.75 E9/L (ref 0.1–0.95)
MONOCYTES RELATIVE PERCENT: 7.5 % (ref 2–12)
NEUTROPHILS ABSOLUTE: 7.65 E9/L (ref 1.8–7.3)
NEUTROPHILS RELATIVE PERCENT: 76.5 % (ref 43–80)
PDW BLD-RTO: 15.6 FL (ref 11.5–15)
PLATELET # BLD: 239 E9/L (ref 130–450)
PMV BLD AUTO: 11.2 FL (ref 7–12)
POTASSIUM REFLEX MAGNESIUM: 3.7 MMOL/L (ref 3.5–5)
RBC # BLD: 3.08 E12/L (ref 3.5–5.5)
SODIUM BLD-SCNC: 139 MMOL/L (ref 132–146)
WBC # BLD: 10 E9/L (ref 4.5–11.5)

## 2019-08-04 PROCEDURE — 6360000002 HC RX W HCPCS: Performed by: INTERNAL MEDICINE

## 2019-08-04 PROCEDURE — 80048 BASIC METABOLIC PNL TOTAL CA: CPT

## 2019-08-04 PROCEDURE — 85025 COMPLETE CBC W/AUTO DIFF WBC: CPT

## 2019-08-04 PROCEDURE — 2060000000 HC ICU INTERMEDIATE R&B

## 2019-08-04 PROCEDURE — 36415 COLL VENOUS BLD VENIPUNCTURE: CPT

## 2019-08-04 PROCEDURE — 6370000000 HC RX 637 (ALT 250 FOR IP): Performed by: INTERNAL MEDICINE

## 2019-08-04 PROCEDURE — 87081 CULTURE SCREEN ONLY: CPT

## 2019-08-04 RX ADMIN — ACETAMINOPHEN 650 MG: 325 TABLET ORAL at 23:43

## 2019-08-04 RX ADMIN — ENOXAPARIN SODIUM 30 MG: 30 INJECTION SUBCUTANEOUS at 08:51

## 2019-08-04 ASSESSMENT — PAIN SCALES - GENERAL
PAINLEVEL_OUTOF10: 0
PAINLEVEL_OUTOF10: 4

## 2019-08-05 VITALS — OXYGEN SATURATION: 97 % | DIASTOLIC BLOOD PRESSURE: 64 MMHG | SYSTOLIC BLOOD PRESSURE: 146 MMHG

## 2019-08-05 PROBLEM — R76.8 RED BLOOD CELL ANTIBODY POSITIVE: Chronic | Status: ACTIVE | Noted: 2019-08-05

## 2019-08-05 PROBLEM — E44.0 MODERATE PROTEIN-CALORIE MALNUTRITION (HCC): Status: ACTIVE | Noted: 2019-08-05

## 2019-08-05 LAB
ANION GAP SERPL CALCULATED.3IONS-SCNC: 9 MMOL/L (ref 7–16)
BASOPHILS ABSOLUTE: 0.03 E9/L (ref 0–0.2)
BASOPHILS RELATIVE PERCENT: 0.3 % (ref 0–2)
BLOOD BANK DISPENSE STATUS: NORMAL
BLOOD BANK DISPENSE STATUS: NORMAL
BLOOD BANK PRODUCT CODE: NORMAL
BLOOD BANK PRODUCT CODE: NORMAL
BPU ID: NORMAL
BPU ID: NORMAL
BUN BLDV-MCNC: 13 MG/DL (ref 8–23)
CALCIUM SERPL-MCNC: 8.6 MG/DL (ref 8.6–10.2)
CHLORIDE BLD-SCNC: 101 MMOL/L (ref 98–107)
CO2: 28 MMOL/L (ref 22–29)
CREAT SERPL-MCNC: 0.9 MG/DL (ref 0.5–1)
DESCRIPTION BLOOD BANK: NORMAL
DESCRIPTION BLOOD BANK: NORMAL
EOSINOPHILS ABSOLUTE: 0.26 E9/L (ref 0.05–0.5)
EOSINOPHILS RELATIVE PERCENT: 2.4 % (ref 0–6)
GFR AFRICAN AMERICAN: >60
GFR NON-AFRICAN AMERICAN: 59 ML/MIN/1.73
GLUCOSE BLD-MCNC: 121 MG/DL (ref 74–99)
HCT VFR BLD CALC: 34.3 % (ref 34–48)
HEMOGLOBIN: 10.8 G/DL (ref 11.5–15.5)
IMMATURE GRANULOCYTES #: 0.09 E9/L
IMMATURE GRANULOCYTES %: 0.8 % (ref 0–5)
LYMPHOCYTES ABSOLUTE: 1.44 E9/L (ref 1.5–4)
LYMPHOCYTES RELATIVE PERCENT: 13.5 % (ref 20–42)
MCH RBC QN AUTO: 31.4 PG (ref 26–35)
MCHC RBC AUTO-ENTMCNC: 31.5 % (ref 32–34.5)
MCV RBC AUTO: 99.7 FL (ref 80–99.9)
MONOCYTES ABSOLUTE: 0.75 E9/L (ref 0.1–0.95)
MONOCYTES RELATIVE PERCENT: 7 % (ref 2–12)
NEUTROPHILS ABSOLUTE: 8.13 E9/L (ref 1.8–7.3)
NEUTROPHILS RELATIVE PERCENT: 76 % (ref 43–80)
PDW BLD-RTO: 15.5 FL (ref 11.5–15)
PLATELET # BLD: 265 E9/L (ref 130–450)
PMV BLD AUTO: 10.9 FL (ref 7–12)
POTASSIUM REFLEX MAGNESIUM: 4.1 MMOL/L (ref 3.5–5)
RBC # BLD: 3.44 E12/L (ref 3.5–5.5)
SODIUM BLD-SCNC: 138 MMOL/L (ref 132–146)
WBC # BLD: 10.7 E9/L (ref 4.5–11.5)

## 2019-08-05 PROCEDURE — 2500000003 HC RX 250 WO HCPCS: Performed by: SURGERY

## 2019-08-05 PROCEDURE — 2580000003 HC RX 258: Performed by: STUDENT IN AN ORGANIZED HEALTH CARE EDUCATION/TRAINING PROGRAM

## 2019-08-05 PROCEDURE — 6360000002 HC RX W HCPCS: Performed by: STUDENT IN AN ORGANIZED HEALTH CARE EDUCATION/TRAINING PROGRAM

## 2019-08-05 PROCEDURE — 88302 TISSUE EXAM BY PATHOLOGIST: CPT

## 2019-08-05 PROCEDURE — C1781 MESH (IMPLANTABLE): HCPCS | Performed by: SURGERY

## 2019-08-05 PROCEDURE — 3700000000 HC ANESTHESIA ATTENDED CARE: Performed by: SURGERY

## 2019-08-05 PROCEDURE — 6370000000 HC RX 637 (ALT 250 FOR IP): Performed by: INTERNAL MEDICINE

## 2019-08-05 PROCEDURE — 80048 BASIC METABOLIC PNL TOTAL CA: CPT

## 2019-08-05 PROCEDURE — 3600000003 HC SURGERY LEVEL 3 BASE: Performed by: SURGERY

## 2019-08-05 PROCEDURE — 2060000000 HC ICU INTERMEDIATE R&B

## 2019-08-05 PROCEDURE — 3700000001 HC ADD 15 MINUTES (ANESTHESIA): Performed by: SURGERY

## 2019-08-05 PROCEDURE — 2700000000 HC OXYGEN THERAPY PER DAY

## 2019-08-05 PROCEDURE — 7100000001 HC PACU RECOVERY - ADDTL 15 MIN: Performed by: SURGERY

## 2019-08-05 PROCEDURE — 3600000013 HC SURGERY LEVEL 3 ADDTL 15MIN: Performed by: SURGERY

## 2019-08-05 PROCEDURE — 6370000000 HC RX 637 (ALT 250 FOR IP): Performed by: STUDENT IN AN ORGANIZED HEALTH CARE EDUCATION/TRAINING PROGRAM

## 2019-08-05 PROCEDURE — 85025 COMPLETE CBC W/AUTO DIFF WBC: CPT

## 2019-08-05 PROCEDURE — 2500000003 HC RX 250 WO HCPCS: Performed by: NURSE ANESTHETIST, CERTIFIED REGISTERED

## 2019-08-05 PROCEDURE — 2580000003 HC RX 258: Performed by: NURSE ANESTHETIST, CERTIFIED REGISTERED

## 2019-08-05 PROCEDURE — 7100000000 HC PACU RECOVERY - FIRST 15 MIN: Performed by: SURGERY

## 2019-08-05 PROCEDURE — 99232 SBSQ HOSP IP/OBS MODERATE 35: CPT | Performed by: INTERNAL MEDICINE

## 2019-08-05 PROCEDURE — 2709999900 HC NON-CHARGEABLE SUPPLY: Performed by: SURGERY

## 2019-08-05 PROCEDURE — 0YU60JZ SUPPLEMENT LEFT INGUINAL REGION WITH SYNTHETIC SUBSTITUTE, OPEN APPROACH: ICD-10-PCS | Performed by: SURGERY

## 2019-08-05 PROCEDURE — 6360000002 HC RX W HCPCS: Performed by: NURSE ANESTHETIST, CERTIFIED REGISTERED

## 2019-08-05 PROCEDURE — 36415 COLL VENOUS BLD VENIPUNCTURE: CPT

## 2019-08-05 DEVICE — MESH HERN W2XL4IN VENTRAL INGUINAL POLYPR REP MFIL: Type: IMPLANTABLE DEVICE | Site: INGUINAL | Status: FUNCTIONAL

## 2019-08-05 RX ORDER — PROPOFOL 10 MG/ML
INJECTION, EMULSION INTRAVENOUS CONTINUOUS PRN
Status: DISCONTINUED | OUTPATIENT
Start: 2019-08-05 | End: 2019-08-05 | Stop reason: SDUPTHER

## 2019-08-05 RX ORDER — CYCLOBENZAPRINE HCL 10 MG
10 TABLET ORAL 3 TIMES DAILY PRN
Status: DISCONTINUED | OUTPATIENT
Start: 2019-08-05 | End: 2019-08-07 | Stop reason: HOSPADM

## 2019-08-05 RX ORDER — LIDOCAINE HYDROCHLORIDE 20 MG/ML
INJECTION, SOLUTION EPIDURAL; INFILTRATION; INTRACAUDAL; PERINEURAL PRN
Status: DISCONTINUED | OUTPATIENT
Start: 2019-08-05 | End: 2019-08-05 | Stop reason: SDUPTHER

## 2019-08-05 RX ORDER — FENTANYL CITRATE 50 UG/ML
INJECTION, SOLUTION INTRAMUSCULAR; INTRAVENOUS PRN
Status: DISCONTINUED | OUTPATIENT
Start: 2019-08-05 | End: 2019-08-05 | Stop reason: SDUPTHER

## 2019-08-05 RX ORDER — SODIUM CHLORIDE, SODIUM LACTATE, POTASSIUM CHLORIDE, CALCIUM CHLORIDE 600; 310; 30; 20 MG/100ML; MG/100ML; MG/100ML; MG/100ML
INJECTION, SOLUTION INTRAVENOUS CONTINUOUS PRN
Status: DISCONTINUED | OUTPATIENT
Start: 2019-08-05 | End: 2019-08-05 | Stop reason: SDUPTHER

## 2019-08-05 RX ORDER — DIPHENHYDRAMINE HYDROCHLORIDE 50 MG/ML
12.5 INJECTION INTRAMUSCULAR; INTRAVENOUS
Status: DISCONTINUED | OUTPATIENT
Start: 2019-08-05 | End: 2019-08-05 | Stop reason: HOSPADM

## 2019-08-05 RX ORDER — MEPERIDINE HYDROCHLORIDE 25 MG/ML
12.5 INJECTION INTRAMUSCULAR; INTRAVENOUS; SUBCUTANEOUS EVERY 5 MIN PRN
Status: DISCONTINUED | OUTPATIENT
Start: 2019-08-05 | End: 2019-08-05 | Stop reason: HOSPADM

## 2019-08-05 RX ADMIN — FENTANYL CITRATE 25 MCG: 50 INJECTION, SOLUTION INTRAMUSCULAR; INTRAVENOUS at 07:05

## 2019-08-05 RX ADMIN — PROPOFOL 50 MCG/KG/MIN: 10 INJECTION, EMULSION INTRAVENOUS at 07:06

## 2019-08-05 RX ADMIN — Medication 2 G: at 06:56

## 2019-08-05 RX ADMIN — Medication 10 ML: at 20:55

## 2019-08-05 RX ADMIN — ACETAMINOPHEN 650 MG: 325 TABLET ORAL at 18:07

## 2019-08-05 RX ADMIN — MAGNESIUM HYDROXIDE 30 ML: 400 SUSPENSION ORAL at 18:06

## 2019-08-05 RX ADMIN — ACETAMINOPHEN 650 MG: 325 TABLET ORAL at 12:36

## 2019-08-05 RX ADMIN — LIDOCAINE HYDROCHLORIDE 40 MG: 20 INJECTION, SOLUTION EPIDURAL; INFILTRATION; INTRACAUDAL; PERINEURAL at 07:06

## 2019-08-05 RX ADMIN — CARBIDOPA AND LEVODOPA 1 TABLET: 25; 100 TABLET ORAL at 20:52

## 2019-08-05 RX ADMIN — SODIUM CHLORIDE, POTASSIUM CHLORIDE, SODIUM LACTATE AND CALCIUM CHLORIDE: 600; 310; 30; 20 INJECTION, SOLUTION INTRAVENOUS at 06:56

## 2019-08-05 RX ADMIN — METOPROLOL TARTRATE 25 MG: 25 TABLET ORAL at 20:52

## 2019-08-05 ASSESSMENT — PULMONARY FUNCTION TESTS
PIF_VALUE: 1
PIF_VALUE: 0
PIF_VALUE: 1
PIF_VALUE: 0
PIF_VALUE: 1
PIF_VALUE: 0
PIF_VALUE: 1
PIF_VALUE: 1
PIF_VALUE: 0
PIF_VALUE: 1
PIF_VALUE: 0

## 2019-08-05 ASSESSMENT — PAIN DESCRIPTION - LOCATION
LOCATION_2: BACK
LOCATION: ABDOMEN
LOCATION: GROIN
LOCATION_2: BACK
LOCATION: GROIN
LOCATION: GROIN

## 2019-08-05 ASSESSMENT — PAIN DESCRIPTION - INTENSITY
RATING_2: 3
RATING_2: 3

## 2019-08-05 ASSESSMENT — PAIN DESCRIPTION - PAIN TYPE
TYPE: SURGICAL PAIN
TYPE: SURGICAL PAIN
TYPE_2: CHRONIC PAIN
TYPE: SURGICAL PAIN
TYPE: SURGICAL PAIN

## 2019-08-05 ASSESSMENT — PAIN SCALES - GENERAL
PAINLEVEL_OUTOF10: 0
PAINLEVEL_OUTOF10: 1
PAINLEVEL_OUTOF10: 1
PAINLEVEL_OUTOF10: 5
PAINLEVEL_OUTOF10: 5
PAINLEVEL_OUTOF10: 1
PAINLEVEL_OUTOF10: 0

## 2019-08-05 ASSESSMENT — PAIN DESCRIPTION - DESCRIPTORS
DESCRIPTORS: DISCOMFORT
DESCRIPTORS_2: ACHING
DESCRIPTORS_2: ACHING
DESCRIPTORS: DISCOMFORT

## 2019-08-05 ASSESSMENT — PAIN DESCRIPTION - ORIENTATION
ORIENTATION_2: LOWER
ORIENTATION: LEFT
ORIENTATION: LEFT
ORIENTATION_2: LOWER
ORIENTATION: LEFT
ORIENTATION: LEFT

## 2019-08-05 ASSESSMENT — PAIN - FUNCTIONAL ASSESSMENT: PAIN_FUNCTIONAL_ASSESSMENT: 0-10

## 2019-08-05 ASSESSMENT — PAIN DESCRIPTION - PROGRESSION
CLINICAL_PROGRESSION: GRADUALLY WORSENING
CLINICAL_PROGRESSION_2: GRADUALLY WORSENING
CLINICAL_PROGRESSION: NOT CHANGED
CLINICAL_PROGRESSION_2: NOT CHANGED
CLINICAL_PROGRESSION: NOT CHANGED

## 2019-08-05 NOTE — PLAN OF CARE
Problem: Malnutrition  (NI-5.2)  Goal: Food and/or Nutrient Delivery  Encourage good intake at meals and ONS postop to promote healing and improved nutrition status  Description  Individualized approach for food/nutrient provision.   Outcome: Met This Shift

## 2019-08-05 NOTE — ANESTHESIA POSTPROCEDURE EVALUATION
Department of Anesthesiology  Postprocedure Note    Patient: Toña Hilton  MRN: [id number]  YOB: 1928  Date of evaluation: 8/5/2019  Time:  10:50 AM     Procedure Summary     Date:  08/05/19 Room / Location:  [facility] OR 02 / [facility] OR    Anesthesia Start:  0656 Anesthesia Stop:  9608    Procedures:       LEFT INGUINAL HERNIA REPAIR (Left )      POSSIBLE LAPAROTOMY  POSSIBLE BOWEL RESECTION (N/A Abdomen) Diagnosis:  (PAIN)    Surgeon:  Sangita Irwin MD Responsible Provider:  Dodie Rose MD    Anesthesia Type:  MAC ASA Status:  3          Anesthesia Type: MAC    Bill Phase I: Bill Score: 8    Bill Phase II:      Last vitals: Reviewed and per EMR flowsheets.        Anesthesia Post Evaluation    Patient location during evaluation: PACU  Patient participation: complete - patient participated  Level of consciousness: awake and alert  Airway patency: patent  Nausea & Vomiting: no nausea and no vomiting  Complications: no  Cardiovascular status: hemodynamically stable  Respiratory status: acceptable  Hydration status: euvolemic

## 2019-08-05 NOTE — PROGRESS NOTES
51-75%(average on liquid diet preop)  · Anthropometric Measures:  · Ht: 5' 2\" (157.5 cm)   · Current Body Wt: 102 lb (46.3 kg)(8/5 bedwt)  · Admission Body Wt: 92 lb (41.7 kg)  · Usual Body Wt: 99 lb (44.9 kg)(per EMR x 3 months ago)  · % Weight Change:  ,  not significant - current gain postop with +Fluid balance  · Ideal Body Wt: 110 lb (49.9 kg), % Ideal Body 93%  · BMI Classification: BMI 18.5 - 24.9 Normal Weight    Nutrition Interventions:   Modify current diet, Start ONS  Continued Inpatient Monitoring, Education Not Indicated, Coordination of Care    Nutrition Evaluation:   · Evaluation: Goals set   · Goals: PO 75% or better at meals and ONS    · Monitoring: Meal Intake, Supplement Intake, Skin Integrity, Wound Healing, I&O, Weight, Pertinent Labs, Monitor Hemodynamic Status, Monitor Bowel Function      Electronically signed by Trung Velasco RD, CNSC, LD on 8/5/19 at 4:57 PM    Contact Number: [phone]

## 2019-08-05 NOTE — PROGRESS NOTES
INPATIENT CARDIOLOGY FOLLOW-UP    Name: Diane Ramesh    Age: 80 y.o. Date of Admission: 8/1/2019  2:16 AM    Date of Service: 8/5/2019    Chief Complaint: Follow-up for new a fib    Interim History:  Had surgery today Dr Sharrie Goodell - hernia repair  Spontaneously converted to NSR over night, diltiazem drip continued perioperatively, remains in sinus  No new overnight cardiac complaints. Currently with no complaints of CP, SOB, palpitations, dizziness, or lightheadedness. SR on telemetry. Review of Systems:   Cardiac: As per HPI  General: No fever, chills  Pulmonary: As per HPI  HEENT: No visual disturbances, difficult swallowing  GI: No nausea, vomiting  Endocrine: No thyroid disease or DM  Musculoskeletal: FROST x 4, no focal motor deficits  Skin: Intact, no rashes  Neuro/Psych: No headache or seizures    Problem List:  Patient Active Problem List   Diagnosis    Subtrochanteric fracture of right femur ([facility] [address]    Parkinson's disease ([facility] [address]    Renal insufficiency    Hypertension    Abdominal pain    Chronic bilateral thoracic back pain    Thoracic compression fracture, closed, initial encounter ([facility] [address]    SBO (small bowel obstruction) ([facility] [address]    Red blood cell antibody positive    Moderate protein-calorie malnutrition (HCC)       Allergies:   Allergies   Allergen Reactions    Pcn [Penicillins] Itching       Current Medications:  Current Facility-Administered Medications   Medication Dose Route Frequency Provider Last Rate Last Dose    carbidopa-levodopa (SINEMET)  MG per tablet 1 tablet  1 tablet Oral BID Belvie Harada,         cyclobenzaprine (FLEXERIL) tablet 10 mg  10 mg Oral TID PRN Belvie Harada, DO        metoprolol tartrate (LOPRESSOR) tablet 25 mg  25 mg Oral BID Declan Bustos MD        acetaminophen (TYLENOL) tablet 650 mg  650 mg Oral Q4H PRN Stephany Srivastava MD   650 mg at 08/05/19 1807    enoxaparin (LOVENOX) injection 30 mg  30 mg Subcutaneous Daily Stephany Srivastava MD   30 mg at DC diltiazem drip  2. Start metoprolol tart 25 po bid  3. Recommend anticoagulation - risk of recurrence high - (recent surgery, LA enlarged). Apixaban 2.5 bid when OK with surgery. Discussed risks/benefits with patient and daughter, agree to proceed.       Mark Castañeda MD  [facility]) Cardiology

## 2019-08-06 ENCOUNTER — APPOINTMENT (OUTPATIENT)
Dept: MRI IMAGING | Age: 84
DRG: 351 | End: 2019-08-06
Payer: MEDICARE

## 2019-08-06 PROBLEM — S32.020A COMPRESSION FRACTURE OF L2 VERTEBRA (HCC): Status: ACTIVE | Noted: 2019-08-06

## 2019-08-06 LAB
ANION GAP SERPL CALCULATED.3IONS-SCNC: 9 MMOL/L (ref 7–16)
BASOPHILS ABSOLUTE: 0.04 E9/L (ref 0–0.2)
BASOPHILS RELATIVE PERCENT: 0.4 % (ref 0–2)
BUN BLDV-MCNC: 12 MG/DL (ref 8–23)
CALCIUM SERPL-MCNC: 8.4 MG/DL (ref 8.6–10.2)
CHLORIDE BLD-SCNC: 100 MMOL/L (ref 98–107)
CO2: 28 MMOL/L (ref 22–29)
CREAT SERPL-MCNC: 0.8 MG/DL (ref 0.5–1)
EOSINOPHILS ABSOLUTE: 0.36 E9/L (ref 0.05–0.5)
EOSINOPHILS RELATIVE PERCENT: 3.5 % (ref 0–6)
GFR AFRICAN AMERICAN: >60
GFR NON-AFRICAN AMERICAN: >60 ML/MIN/1.73
GLUCOSE BLD-MCNC: 103 MG/DL (ref 74–99)
HCT VFR BLD CALC: 35.3 % (ref 34–48)
HEMOGLOBIN: 11.3 G/DL (ref 11.5–15.5)
IMMATURE GRANULOCYTES #: 0.13 E9/L
IMMATURE GRANULOCYTES %: 1.3 % (ref 0–5)
LYMPHOCYTES ABSOLUTE: 0.8 E9/L (ref 1.5–4)
LYMPHOCYTES RELATIVE PERCENT: 7.8 % (ref 20–42)
MCH RBC QN AUTO: 31.7 PG (ref 26–35)
MCHC RBC AUTO-ENTMCNC: 32 % (ref 32–34.5)
MCV RBC AUTO: 99.2 FL (ref 80–99.9)
MONOCYTES ABSOLUTE: 0.7 E9/L (ref 0.1–0.95)
MONOCYTES RELATIVE PERCENT: 6.8 % (ref 2–12)
MRSA CULTURE ONLY: NORMAL
NEUTROPHILS ABSOLUTE: 8.28 E9/L (ref 1.8–7.3)
NEUTROPHILS RELATIVE PERCENT: 80.2 % (ref 43–80)
PDW BLD-RTO: 15 FL (ref 11.5–15)
PLATELET # BLD: 254 E9/L (ref 130–450)
PMV BLD AUTO: 11.1 FL (ref 7–12)
POTASSIUM REFLEX MAGNESIUM: 3.9 MMOL/L (ref 3.5–5)
RBC # BLD: 3.56 E12/L (ref 3.5–5.5)
SODIUM BLD-SCNC: 137 MMOL/L (ref 132–146)
WBC # BLD: 10.3 E9/L (ref 4.5–11.5)

## 2019-08-06 PROCEDURE — 72148 MRI LUMBAR SPINE W/O DYE: CPT

## 2019-08-06 PROCEDURE — 2700000000 HC OXYGEN THERAPY PER DAY

## 2019-08-06 PROCEDURE — 36415 COLL VENOUS BLD VENIPUNCTURE: CPT

## 2019-08-06 PROCEDURE — 99222 1ST HOSP IP/OBS MODERATE 55: CPT | Performed by: NEUROLOGICAL SURGERY

## 2019-08-06 PROCEDURE — 6370000000 HC RX 637 (ALT 250 FOR IP): Performed by: INTERNAL MEDICINE

## 2019-08-06 PROCEDURE — 2060000000 HC ICU INTERMEDIATE R&B

## 2019-08-06 PROCEDURE — 2580000003 HC RX 258: Performed by: STUDENT IN AN ORGANIZED HEALTH CARE EDUCATION/TRAINING PROGRAM

## 2019-08-06 PROCEDURE — 85025 COMPLETE CBC W/AUTO DIFF WBC: CPT

## 2019-08-06 PROCEDURE — 97161 PT EVAL LOW COMPLEX 20 MIN: CPT

## 2019-08-06 PROCEDURE — 6370000000 HC RX 637 (ALT 250 FOR IP): Performed by: STUDENT IN AN ORGANIZED HEALTH CARE EDUCATION/TRAINING PROGRAM

## 2019-08-06 PROCEDURE — 80048 BASIC METABOLIC PNL TOTAL CA: CPT

## 2019-08-06 PROCEDURE — 6360000002 HC RX W HCPCS: Performed by: STUDENT IN AN ORGANIZED HEALTH CARE EDUCATION/TRAINING PROGRAM

## 2019-08-06 PROCEDURE — 72146 MRI CHEST SPINE W/O DYE: CPT

## 2019-08-06 PROCEDURE — 99232 SBSQ HOSP IP/OBS MODERATE 35: CPT | Performed by: INTERNAL MEDICINE

## 2019-08-06 RX ADMIN — Medication 10 ML: at 20:31

## 2019-08-06 RX ADMIN — ACETAMINOPHEN 650 MG: 325 TABLET ORAL at 00:45

## 2019-08-06 RX ADMIN — METOPROLOL TARTRATE 25 MG: 25 TABLET ORAL at 20:29

## 2019-08-06 RX ADMIN — SODIUM CHLORIDE, POTASSIUM CHLORIDE, SODIUM LACTATE AND CALCIUM CHLORIDE: 600; 310; 30; 20 INJECTION, SOLUTION INTRAVENOUS at 08:27

## 2019-08-06 RX ADMIN — MAGNESIUM HYDROXIDE 30 ML: 400 SUSPENSION ORAL at 19:02

## 2019-08-06 RX ADMIN — METOPROLOL TARTRATE 25 MG: 25 TABLET ORAL at 08:26

## 2019-08-06 RX ADMIN — ENOXAPARIN SODIUM 30 MG: 30 INJECTION SUBCUTANEOUS at 08:26

## 2019-08-06 RX ADMIN — CARBIDOPA AND LEVODOPA 1 TABLET: 25; 100 TABLET ORAL at 08:26

## 2019-08-06 RX ADMIN — CARBIDOPA AND LEVODOPA 1 TABLET: 25; 100 TABLET ORAL at 20:29

## 2019-08-06 RX ADMIN — ACETAMINOPHEN 650 MG: 325 TABLET ORAL at 19:02

## 2019-08-06 RX ADMIN — ACETAMINOPHEN 650 MG: 325 TABLET ORAL at 08:27

## 2019-08-06 RX ADMIN — ACETAMINOPHEN 650 MG: 325 TABLET ORAL at 14:50

## 2019-08-06 ASSESSMENT — PAIN DESCRIPTION - FREQUENCY
FREQUENCY: CONTINUOUS

## 2019-08-06 ASSESSMENT — PAIN - FUNCTIONAL ASSESSMENT
PAIN_FUNCTIONAL_ASSESSMENT: PREVENTS OR INTERFERES SOME ACTIVE ACTIVITIES AND ADLS
PAIN_FUNCTIONAL_ASSESSMENT: PREVENTS OR INTERFERES WITH MANY ACTIVE NOT PASSIVE ACTIVITIES
PAIN_FUNCTIONAL_ASSESSMENT: PREVENTS OR INTERFERES WITH MANY ACTIVE NOT PASSIVE ACTIVITIES

## 2019-08-06 ASSESSMENT — PAIN DESCRIPTION - DESCRIPTORS
DESCRIPTORS: ACHING;CONSTANT;DISCOMFORT

## 2019-08-06 ASSESSMENT — PAIN DESCRIPTION - ORIENTATION
ORIENTATION: LOWER

## 2019-08-06 ASSESSMENT — PAIN DESCRIPTION - ONSET
ONSET: ON-GOING

## 2019-08-06 ASSESSMENT — PAIN DESCRIPTION - PAIN TYPE
TYPE: CHRONIC PAIN

## 2019-08-06 ASSESSMENT — PAIN DESCRIPTION - PROGRESSION
CLINICAL_PROGRESSION: NOT CHANGED
CLINICAL_PROGRESSION: NOT CHANGED
CLINICAL_PROGRESSION: GRADUALLY IMPROVING

## 2019-08-06 ASSESSMENT — PAIN SCALES - GENERAL
PAINLEVEL_OUTOF10: 5
PAINLEVEL_OUTOF10: 4
PAINLEVEL_OUTOF10: 4
PAINLEVEL_OUTOF10: 5
PAINLEVEL_OUTOF10: 5
PAINLEVEL_OUTOF10: 2
PAINLEVEL_OUTOF10: 0
PAINLEVEL_OUTOF10: 1
PAINLEVEL_OUTOF10: 2

## 2019-08-06 ASSESSMENT — PAIN DESCRIPTION - LOCATION
LOCATION: BACK

## 2019-08-06 NOTE — CONSULTS
[address] [address] NEUROSURGERY     Patient: Michel Nelson  : 1928  MRN: [id number]    Date of Consultation: 2019  Date of Service: 2019    Reason for Consultation: Compression fracture     History of Present Illness: This is a 80year old female who presented to the ED for abdominal pain. Patient is s/p open inguinal hernia repair. Patient with hx of T12 kyphoplasty in . Was seen 1 month post op with recurrent pain and MRI t/l spine ordered but not completed. States he pain is lower, midline. Denies radicular pain, weakness, n/t. No loss of bowel or bladder function. Family present with generalized questions. Allergies:   Pcn [penicillins]    Past Medical History:      Diagnosis Date    Decreased ambulation status     uses cane    Gastric reflux     Hypertension     Left cataract 16    for removal    Parkinson disease ([facility] [address]     tremors of hands/arm / left leg    Vitamin D deficiency        Surgical History:      Procedure Laterality Date    CATARACT REMOVAL WITH IMPLANT Left 2016    HERNIA REPAIR Left 2019    LEFT INGUINAL HERNIA REPAIR performed by Cheryl Og MD at [address] [facility] [address] Right 2015    ORIF left hip inter & subtrochanteric fx. w/long gamma nail. Danae Camejo MD    HYSTERECTOMY  ? robotic    KYPHOSIS SURGERY  2017    T8    KYPHOSIS SURGERY      KYPHOSIS SURGERY N/A 6/10/2019    T11 VERTEBRAL BODY BIOPSY & KYPHOPLASTY performed by Gudelia Ferrell MD at [address] [place] [address] N/A 2019    POSSIBLE LAPAROTOMY  POSSIBLE BOWEL RESECTION performed by Cheryl Og MD at [address] Right 1997    Right TKA. Nicole Wayne. Roni Chaney MD       Social History:   reports that she has never smoked. She has never used smokeless tobacco.   reports that she does not drink alcohol. Family History:  History reviewed. No pertinent family history.
Inpatient Cardiology Consultation      Reason for Consult:  AF with RVR (new onset)    Consulting Physician: Dr. Guille Rios     Requesting Physician:  Dr. Yaz Kaur    Date of Consultation: 8/2/2019    HISTORY OF PRESENT ILLNESS:   Ms. Elenita Gilbert is a 81 y/o female who is new to [facility]. PMH: Parkinson disease, GERD, Frailty, HTN, and history of kyphosis surgery. Patient is currently residing at Acute rehab and has been unable to work with PT x 2 weeks due to \"back pain. \"     Ms. Elenita Gilbert presented to [facility] on 8/2/2019 with complaints of Abdominal pain, bloating, and nausea. She was noted to have an incarcerated small bowel hernia with high grade obstruction left inguinal canal, stomach is severely distended, cholelithiasis with gallbladder distention and biliary tree distention, cardiomegaly. She had a NG tube placed and hernia was reduced at bedside. No EKG was done on admission. She was admitted to a non-telemetry monitored unit. On 8/2/2019, patient was noted to be IRR (tachycardic) on clinical exam and EKG was ordered. She was noted to be AF with RVR on EKG. She was transferred to telemetry monitored unit and started on Cardizem IV gtt. Cardiology was consulted. She is currently AF (with intermittent RVR). She appears frail and is complaining of Left lower abdominal discomfort. Patient's daughter is at the bedside. ECHO - pending. Please note: past medical records were reviewed per electronic medical record (EMR) - see detailed reports under Past Medical/ Surgical History. Past Medical History:    1. Parkinson disease  2. HTN  3. GERD  4. Vitamin D deficiency   5. Frailty     Past Surgical History:    Past Surgical History:   Procedure Laterality Date    CATARACT REMOVAL WITH IMPLANT Left 12/06/2016    HIP FRACTURE SURGERY Right 11/16/2015    ORIF left hip inter & subtrochanteric fx. w/long gamma nail. Siena Jiemnez MD    HYSTERECTOMY  2011?     robotic    KYPHOSIS SURGERY
scattered air-fluid levels. The terminal ileum is within normal limits. Small bowel transition zone located in the left inguinal hernia involving small bowel. Appendix: The appendix is visualized and within normal limits for size and wall thickness. Intraperitoneal space: Normal. No free air. No significant fluid collection. Vasculature: Normal. No abdominal aortic aneurysm. Lymph nodes: Normal. No enlarged lymph nodes. Bladder: Unremarkable as visualized. Reproductive: The uterus is surgically absent. Bones/joints: Patient is status post placement of left femoral intramedullary isabella and pin traversing the right femoral neck. The bones are near normal anatomic alignment. Degenerative changes involving the pubic symphysis are noted consistent with osteitis pubis. The bones are diffusely osteopenic. Evidence of prior vertebroplasty/kyphoplasty procedure with methyl methacrylate is noted involving the level(s) of T12. Endplate concavity involving one or more vertebral bodies consistent with osteoporotic compression deformity appears chronic involving the level(s) of L2-L4, T11-T12. Retropulsion T11 and T12 is noted without significant canal stenosis. Primary osteoarthritic degenerative changes involve the hip joints. Soft tissues: Unremarkable. 1. Incarcerated small bowel hernia with high-grade obstruction left inguinal canal. The stomach is severely distended as is the visualized lower thoracic esophagus. 2. Cholelithiasis with gallbladder distention and biliary tree distention. Correlation with liver function testing is recommended to determine if an MRCP may be indicated. 3. Cardiomegaly. 4. Additional nonacute findings as described above. THIS REPORT CONTAINS FINDINGS THAT MAY BE CRITICAL TO PATIENT CARE. The findings were verbally communicated via telephone conference with Aparna Sanchez at 4:30 AM EDT on 8/1/2019. The findings were acknowledged and understood.  This report has been electronically signed

## 2019-08-06 NOTE — PROGRESS NOTES
Physical Therapy    Facility/Department: [address] INTERNAL MEDICINE 2  Initial Assessment    NAME: Chapo Hercules  : 1928  MRN: [id number]    Date of Service: 2019        Patient Diagnosis(es): The encounter diagnosis was SBO (small bowel obstruction) ([facility] [address] has a past medical history of Decreased ambulation status, Gastric reflux, Hypertension, Left cataract, Parkinson disease ([facility] [address] and Vitamin D deficiency. has a past surgical history that includes Hip fracture surgery (Right, 2015); Hysterectomy (?); Tonsillectomy; Cataract removal with implant (Left, 2016); Total knee arthroplasty (Right, 1997); Kyphosis surgery (2017); Kyphosis surgery; Kyphosis surgery (N/A, 6/10/2019); hernia repair (Left, 2019); and LAPAROTOMY EXPLORATORY (N/A, 2019). Evaluating Therapist: Jeniffer Farooq PT      Room #: 0193/5968-H  DIAGNOSIS: SBO  PMHx: 6/10/19 T12 VERTEBRAL BODY BIOPSY    Procedure:  Left inguinal herniorrhaphy with BARD soft mesh. (19)  PRECAUTIONS: fall risk, abdominal splinting, spinal precautions    Social:  Pt admitted from [facility]. Prior to admission pt walked with walker with therapy. Pt reported she was able to get herself in and out of bed without assistance. Pt reported she got into bed herself one day and strained and the next day her back was very painful. Initial Evaluation  Date:  Treatment      Short Term/ Long Term   Goals   Was pt agreeable to Eval/treatment? yes     Does pt have pain? Back pain with mobility. Increased when sitting EOB and standing     Bed Mobility  Rolling: Mod A  Supine to sit: Mod A  Sit to supine: Max A  Scooting: Mod A to sitting EOB  Min A   Transfers Sit to stand: Mod A  Stand to sit: Min A  Stand pivot: NT  SBA   Ambulation    4 side steps toward HOB with w/w Min A. Limited by pain.    25+ feet with w/w with SBA   Stair negotiation: ascended and descended NT      AM-PAC raw score        Pt is alert and Oriented x3  LE ROM: WFL  LE strength:  Grossly 3+/5  Balance: sitting EOB Min A, standing with w/w Min A. Sensation: intact  Endurance: poor    Chair alarm: yes     ASSESSMENT  Pt displays functional ability as noted in the objective portion of this evaluation. Comments/Treatment:  Pt found in bed with family present. No report of dizziness during functional mobility. Pt educated about log roll technique for bed mobility and spinal precautions. Pt with difficulty sitting upright on EOB due to back pain. Once standing, pt reported increased back pain and demonstrated difficulty taking side steps with use of walker. Standing tolerance limited due to back pain. Pt with forward flexed posture when sitting and standing. At end of eval, pt left in bed with call light in reach, bed alarm on and family present. Examination of body systems Decreased   Functional mobility x   ROM    Strength x   Safety Awareness    Cognition    Endurance x   Sensation    Balance x   Vision/Visual Deficets    Coordination      Patient education  Pt educated on PT objectives during eval and while in the hospital, upright posture when sitting EOB. Patient response to education:   Pt verbalized understanding Pt demonstrated skill Pt requires further education in this area       yes     Rehab potential is Good for reaching above PT goals. Pts/ family goals   1. None stated    Patient and or family understand(s) diagnosis, prognosis, and plan of care. PLAN  PT care will be provided in accordance with the objectives noted above. Whenever appropriate, clear delegation orders will be provided for nursing staff. Exercises and functional mobility practice will be used as well as appropriate assistive devices or modalities to obtain goals. Patient and family education will also be administered as needed. Frequency of treatments will be 2-5x/week x 7-10 days.     Time in: 1335  Time out: [address],

## 2019-08-06 NOTE — PROGRESS NOTES
anemia. Recommendations:  Surgery input noted and appreciated  Had discussion with patient, daughter,  Cari Rutledge. Taking all considerations, my recommendation would be initiate low dose apixaban for stroke prophylaxis (annunal stroke risk 4.8%) as I believe it outweighs risk of fall/bleeding. Explained that [facility] will increase risk of bleeding but feel benefit is favored. Cont metoprolol   OK to DC from cardiology standpoint  Follow up with Dr Maria Elena Gee outpatient after dc from rehab    Will sign off, please call with questions.      Merlinda Speck, MD  [facility]) Cardiology

## 2019-08-07 ENCOUNTER — HOSPITAL ENCOUNTER (INPATIENT)
Age: 84
LOS: 2 days | Discharge: NURSING, REHAB OR LONG-TERM CARE | DRG: 479 | End: 2019-08-09
Attending: INTERNAL MEDICINE | Admitting: INTERNAL MEDICINE
Payer: MEDICARE

## 2019-08-07 ENCOUNTER — HOSPITAL ENCOUNTER (OUTPATIENT)
Age: 84
Discharge: HOME | End: 2019-08-07
Payer: MEDICARE

## 2019-08-07 VITALS
BODY MASS INDEX: 19.64 KG/M2 | HEART RATE: 73 BPM | OXYGEN SATURATION: 93 % | TEMPERATURE: 97.8 F | SYSTOLIC BLOOD PRESSURE: 183 MMHG | DIASTOLIC BLOOD PRESSURE: 74 MMHG | WEIGHT: 106.7 LBS | RESPIRATION RATE: 16 BRPM | HEIGHT: 62 IN

## 2019-08-07 DIAGNOSIS — M54.5 LOW BACK PAIN, UNSPECIFIED BACK PAIN LATERALITY, UNSPECIFIED CHRONICITY, WITH SCIATICA PRESENCE UNSPECIFIED: ICD-10-CM

## 2019-08-07 PROBLEM — S32.000A LUMBAR COMPRESSION FRACTURE, CLOSED, INITIAL ENCOUNTER (HCC): Status: ACTIVE | Noted: 2019-08-07

## 2019-08-07 PROCEDURE — 2580000003 HC RX 258: Performed by: STUDENT IN AN ORGANIZED HEALTH CARE EDUCATION/TRAINING PROGRAM

## 2019-08-07 PROCEDURE — 99232 SBSQ HOSP IP/OBS MODERATE 35: CPT | Performed by: INTERNAL MEDICINE

## 2019-08-07 PROCEDURE — 6370000000 HC RX 637 (ALT 250 FOR IP): Performed by: STUDENT IN AN ORGANIZED HEALTH CARE EDUCATION/TRAINING PROGRAM

## 2019-08-07 PROCEDURE — A0426 ALS 1: HCPCS

## 2019-08-07 PROCEDURE — 6370000000 HC RX 637 (ALT 250 FOR IP): Performed by: INTERNAL MEDICINE

## 2019-08-07 PROCEDURE — 2060000000 HC ICU INTERMEDIATE R&B

## 2019-08-07 PROCEDURE — A0425 GROUND MILEAGE: HCPCS

## 2019-08-07 RX ORDER — BISACODYL 10 MG
10 SUPPOSITORY, RECTAL RECTAL DAILY
Status: DISCONTINUED | OUTPATIENT
Start: 2019-08-08 | End: 2019-08-09 | Stop reason: HOSPADM

## 2019-08-07 RX ORDER — CYCLOBENZAPRINE HCL 10 MG
10 TABLET ORAL 3 TIMES DAILY PRN
Status: CANCELLED | OUTPATIENT
Start: 2019-08-07

## 2019-08-07 RX ORDER — DIMETHICONE, OXYBENZONE, AND PADIMATE O 2; 2.5; 6.6 G/100G; G/100G; G/100G
STICK TOPICAL PRN
Status: DISCONTINUED | OUTPATIENT
Start: 2019-08-07 | End: 2019-08-09 | Stop reason: HOSPADM

## 2019-08-07 RX ORDER — BISACODYL 10 MG
10 SUPPOSITORY, RECTAL RECTAL DAILY
Status: DISCONTINUED | OUTPATIENT
Start: 2019-08-07 | End: 2019-08-07 | Stop reason: HOSPADM

## 2019-08-07 RX ORDER — ONDANSETRON 2 MG/ML
4 INJECTION INTRAMUSCULAR; INTRAVENOUS EVERY 6 HOURS PRN
Status: DISCONTINUED | OUTPATIENT
Start: 2019-08-07 | End: 2019-08-08 | Stop reason: SDUPTHER

## 2019-08-07 RX ORDER — CYCLOBENZAPRINE HCL 10 MG
10 TABLET ORAL 3 TIMES DAILY PRN
Status: DISCONTINUED | OUTPATIENT
Start: 2019-08-07 | End: 2019-08-09 | Stop reason: HOSPADM

## 2019-08-07 RX ORDER — ACETAMINOPHEN 325 MG/1
650 TABLET ORAL EVERY 4 HOURS PRN
Status: DISCONTINUED | OUTPATIENT
Start: 2019-08-07 | End: 2019-08-09 | Stop reason: HOSPADM

## 2019-08-07 RX ORDER — DIMETHICONE, OXYBENZONE, AND PADIMATE O 2; 2.5; 6.6 G/100G; G/100G; G/100G
STICK TOPICAL PRN
Status: CANCELLED | OUTPATIENT
Start: 2019-08-07

## 2019-08-07 RX ORDER — ONDANSETRON 2 MG/ML
4 INJECTION INTRAMUSCULAR; INTRAVENOUS EVERY 6 HOURS PRN
Status: CANCELLED | OUTPATIENT
Start: 2019-08-07

## 2019-08-07 RX ORDER — ACETAMINOPHEN 325 MG/1
650 TABLET ORAL EVERY 4 HOURS PRN
Status: CANCELLED | OUTPATIENT
Start: 2019-08-07

## 2019-08-07 RX ORDER — BISACODYL 10 MG
10 SUPPOSITORY, RECTAL RECTAL DAILY
Status: CANCELLED | OUTPATIENT
Start: 2019-08-08

## 2019-08-07 RX ADMIN — METOPROLOL TARTRATE 25 MG: 25 TABLET ORAL at 08:39

## 2019-08-07 RX ADMIN — Medication 10 ML: at 08:39

## 2019-08-07 RX ADMIN — CARBIDOPA AND LEVODOPA 1 TABLET: 25; 100 TABLET ORAL at 08:39

## 2019-08-07 RX ADMIN — CARBIDOPA AND LEVODOPA 1 TABLET: 25; 100 TABLET ORAL at 20:01

## 2019-08-07 RX ADMIN — METOPROLOL TARTRATE 25 MG: 25 TABLET ORAL at 20:01

## 2019-08-07 ASSESSMENT — PAIN SCALES - GENERAL
PAINLEVEL_OUTOF10: 0
PAINLEVEL_OUTOF10: 2

## 2019-08-07 ASSESSMENT — PAIN DESCRIPTION - PROGRESSION: CLINICAL_PROGRESSION: NOT CHANGED

## 2019-08-07 ASSESSMENT — PAIN DESCRIPTION - ONSET: ONSET: ON-GOING

## 2019-08-07 ASSESSMENT — PAIN DESCRIPTION - PAIN TYPE: TYPE: CHRONIC PAIN

## 2019-08-07 ASSESSMENT — PAIN DESCRIPTION - DESCRIPTORS: DESCRIPTORS: ACHING;CONSTANT;DISCOMFORT

## 2019-08-07 ASSESSMENT — PAIN - FUNCTIONAL ASSESSMENT: PAIN_FUNCTIONAL_ASSESSMENT: PREVENTS OR INTERFERES SOME ACTIVE ACTIVITIES AND ADLS

## 2019-08-07 ASSESSMENT — PAIN DESCRIPTION - ORIENTATION: ORIENTATION: LOWER

## 2019-08-07 ASSESSMENT — PAIN DESCRIPTION - FREQUENCY: FREQUENCY: CONTINUOUS

## 2019-08-07 ASSESSMENT — PAIN DESCRIPTION - LOCATION: LOCATION: BACK

## 2019-08-07 NOTE — DISCHARGE SUMMARY
Physician Discharge Summary     Patient ID:  Terrence Rosario  [id number]  66 y.o.  7/8/1928    Admit date: 8/1/2019    Discharge date and time: 8/7/2019  2:14 PM     Admission Diagnoses: SBO (small bowel obstruction) ([facility] [K56.609]  SBO (small bowel obstruction) ([facility] [K56.609]    Discharge Diagnoses:     1.  Small bowel obstruction due to left inguinal hernia status post repair     2.  Parkinson's disease     3.  Intravascular dehydration     4.  Essential hypertension     5.  Osteoporosis with recurrent compression fractures      6.  New onset atrial fibrillation     7.  T11 and L2 new lumbar compression fractures    Consults: cardiology, general surgery and neurosurgery    Procedures: Left inguinal herniorrhaphy with mesh    Laboratory:   Last 3 BMP  Recent Labs     08/05/19  0330 08/06/19  1010    137   K 4.1 3.9    100   CO2 28 28   BUN 13 12   CREATININE 0.9 0.8   GLUCOSE 121* 103*   CALCIUM 8.6 8.4*       Last 3 CBC:  Recent Labs     08/05/19  0330 08/06/19  1010   WBC 10.7 10.3   RBC 3.44* 3.56   HGB 10.8* 11.3*   HCT 34.3 35.3   MCV 99.7 99.2   MCH 31.4 31.7   MCHC 31.5* 32.0   RDW 15.5* 15.0    254   MPV 10.9 11.1           Hospital Course: The patient is a 80 y.o. female patient of Dr Alejo Gomez who presents with abdominal pain. She resides a local nursing home. She began to develop abdominal pain on the day prior to admission. The pain was located in the upper abdomen and was associated with nausea and radiated into both upper quadrants. He also complains of back pain but this is chronic related to previous compression fracture. She was admitted 2 years ago with abdominal pain and seen by general surgery. Eventually was determined to have a compression fracture and underwent kyphoplasty with good results. She was well until this spring when she developed a recurrent compression fracture. She underwent kyphoplasty on 6/10.   Patient continues to complain of back pain as well as

## 2019-08-07 NOTE — PROGRESS NOTES
obstruction) (HCC)    Moderate protein-calorie malnutrition (HCC)    Compression fracture of L2 vertebra (HCC)  Resolved Problems:    * No resolved hospital problems. *      Assessment:    1.  Small bowel obstruction     2.  Parkinson's disease     3.  Intravascular dehydration     4.  Essential hypertension     5.  Osteoporosis with recurrent compression fractures      6.  New onset atrial fibrillation     7.  T11 and L2 new lumbar compression fractures    Plan:    1. Discussed with patient, she is agreeable to kyphoplasty    2. I have called access center to arrange transfer downtown    3. Neurosurgery notified    4.   I spoke with daughter      Linde Hamman Cecilio Quinton  8:46 AM  8/7/2019

## 2019-08-07 NOTE — PROGRESS NOTES
INPATIENT CARDIOLOGY FOLLOW-UP    Name: Toña Hilton    Age: 80 y.o. Date of Admission: 8/1/2019  2:16 AM    Date of Service: 8/7/2019    Chief Complaint: Follow-up for new atrial fib    Interim History:  Notified this AM of recurrence of atrial fib / possible atrial flutter and a 2.8 second pause while in a fib. Patient was asymptomatic. Now back in sinus  New thoracic compression fractures identified, plans for neurosurg to perform kyphoplasty and pt to be transferred to [facility]  She moved her bowels and feels better. No CP or SOB    Review of Systems:   Cardiac: As per HPI  General: No fever, chills  Pulmonary: As per HPI  HEENT: No visual disturbances, difficult swallowing  GI: No nausea, vomiting. Endocrine: No thyroid disease or DM  Musculoskeletal: FROST x 4, no focal motor deficits  Skin: Intact, no rashes  Neuro/Psych: No headache or seizures    Problem List:  Patient Active Problem List   Diagnosis    Subtrochanteric fracture of right femur ([address]    Parkinson's disease ([address]    Renal insufficiency    Hypertension    Abdominal pain    Chronic bilateral thoracic back pain    Thoracic compression fracture, closed, initial encounter ([address]    SBO (small bowel obstruction) ([address]    Red blood cell antibody positive    Moderate protein-calorie malnutrition (HCC)    Compression fracture of L2 vertebra (HCC)    Lumbar compression fracture, closed, initial encounter ([address]       Allergies:   Allergies   Allergen Reactions    Pcn [Penicillins] Itching       Current Medications:  Current Facility-Administered Medications   Medication Dose Route Frequency Provider Last Rate Last Dose    bisacodyl (DULCOLAX) suppository 10 mg  10 mg Rectal Daily Stephen Patrick MD        carbidopa-levodopa (SINEMET)  MG per tablet 1 tablet  1 tablet Oral BID Pravin Rick DO   1 tablet at 08/07/19 9731    cyclobenzaprine (FLEXERIL) tablet 10 mg  10 mg Oral TID PRN Pravin Rick DO       [facility]

## 2019-08-07 NOTE — PROGRESS NOTES
Nurse to nurse called to [facility] floor, pt and daughter made aware of transfer and transport to be here within 40 minutes

## 2019-08-08 ENCOUNTER — ANESTHESIA EVENT (OUTPATIENT)
Dept: OPERATING ROOM | Age: 84
DRG: 479 | End: 2019-08-08
Payer: MEDICARE

## 2019-08-08 ENCOUNTER — APPOINTMENT (OUTPATIENT)
Dept: GENERAL RADIOLOGY | Age: 84
DRG: 479 | End: 2019-08-08
Attending: INTERNAL MEDICINE
Payer: MEDICARE

## 2019-08-08 ENCOUNTER — ANESTHESIA (OUTPATIENT)
Dept: OPERATING ROOM | Age: 84
DRG: 479 | End: 2019-08-08
Payer: MEDICARE

## 2019-08-08 VITALS
OXYGEN SATURATION: 100 % | RESPIRATION RATE: 12 BRPM | SYSTOLIC BLOOD PRESSURE: 157 MMHG | DIASTOLIC BLOOD PRESSURE: 86 MMHG

## 2019-08-08 PROBLEM — I48.0 PAF (PAROXYSMAL ATRIAL FIBRILLATION) (HCC): Status: ACTIVE | Noted: 2019-08-08

## 2019-08-08 PROCEDURE — 6370000000 HC RX 637 (ALT 250 FOR IP): Performed by: PHYSICIAN ASSISTANT

## 2019-08-08 PROCEDURE — 7100000001 HC PACU RECOVERY - ADDTL 15 MIN: Performed by: NEUROLOGICAL SURGERY

## 2019-08-08 PROCEDURE — 6360000002 HC RX W HCPCS: Performed by: NURSE ANESTHETIST, CERTIFIED REGISTERED

## 2019-08-08 PROCEDURE — 22515 PERQ VERTEBRAL AUGMENTATION: CPT | Performed by: NEUROLOGICAL SURGERY

## 2019-08-08 PROCEDURE — 0PB43ZX EXCISION OF THORACIC VERTEBRA, PERCUTANEOUS APPROACH, DIAGNOSTIC: ICD-10-PCS | Performed by: NEUROLOGICAL SURGERY

## 2019-08-08 PROCEDURE — 97165 OT EVAL LOW COMPLEX 30 MIN: CPT

## 2019-08-08 PROCEDURE — 2580000003 HC RX 258: Performed by: PHYSICIAN ASSISTANT

## 2019-08-08 PROCEDURE — 0QB03ZX EXCISION OF LUMBAR VERTEBRA, PERCUTANEOUS APPROACH, DIAGNOSTIC: ICD-10-PCS | Performed by: NEUROLOGICAL SURGERY

## 2019-08-08 PROCEDURE — 3209999900 FLUORO FOR SURGICAL PROCEDURES

## 2019-08-08 PROCEDURE — 2060000000 HC ICU INTERMEDIATE R&B

## 2019-08-08 PROCEDURE — 2700000000 HC OXYGEN THERAPY PER DAY

## 2019-08-08 PROCEDURE — 2709999900 HC NON-CHARGEABLE SUPPLY: Performed by: NEUROLOGICAL SURGERY

## 2019-08-08 PROCEDURE — 6360000002 HC RX W HCPCS: Performed by: ANESTHESIOLOGY

## 2019-08-08 PROCEDURE — 0QS03ZZ REPOSITION LUMBAR VERTEBRA, PERCUTANEOUS APPROACH: ICD-10-PCS | Performed by: NEUROLOGICAL SURGERY

## 2019-08-08 PROCEDURE — 3700000001 HC ADD 15 MINUTES (ANESTHESIA): Performed by: NEUROLOGICAL SURGERY

## 2019-08-08 PROCEDURE — C1713 ANCHOR/SCREW BN/BN,TIS/BN: HCPCS | Performed by: NEUROLOGICAL SURGERY

## 2019-08-08 PROCEDURE — 97530 THERAPEUTIC ACTIVITIES: CPT

## 2019-08-08 PROCEDURE — 97162 PT EVAL MOD COMPLEX 30 MIN: CPT

## 2019-08-08 PROCEDURE — 0PS43ZZ REPOSITION THORACIC VERTEBRA, PERCUTANEOUS APPROACH: ICD-10-PCS | Performed by: NEUROLOGICAL SURGERY

## 2019-08-08 PROCEDURE — 0QU03JZ SUPPLEMENT LUMBAR VERTEBRA WITH SYNTHETIC SUBSTITUTE, PERCUTANEOUS APPROACH: ICD-10-PCS | Performed by: NEUROLOGICAL SURGERY

## 2019-08-08 PROCEDURE — 97535 SELF CARE MNGMENT TRAINING: CPT

## 2019-08-08 PROCEDURE — 3600000014 HC SURGERY LEVEL 4 ADDTL 15MIN: Performed by: NEUROLOGICAL SURGERY

## 2019-08-08 PROCEDURE — 3600000004 HC SURGERY LEVEL 4 BASE: Performed by: NEUROLOGICAL SURGERY

## 2019-08-08 PROCEDURE — 2580000003 HC RX 258: Performed by: NURSE ANESTHETIST, CERTIFIED REGISTERED

## 2019-08-08 PROCEDURE — 88311 DECALCIFY TISSUE: CPT

## 2019-08-08 PROCEDURE — 7100000000 HC PACU RECOVERY - FIRST 15 MIN: Performed by: NEUROLOGICAL SURGERY

## 2019-08-08 PROCEDURE — 0PU43JZ SUPPLEMENT THORACIC VERTEBRA WITH SYNTHETIC SUBSTITUTE, PERCUTANEOUS APPROACH: ICD-10-PCS | Performed by: NEUROLOGICAL SURGERY

## 2019-08-08 PROCEDURE — 6360000002 HC RX W HCPCS: Performed by: PHYSICIAN ASSISTANT

## 2019-08-08 PROCEDURE — 99232 SBSQ HOSP IP/OBS MODERATE 35: CPT | Performed by: INTERNAL MEDICINE

## 2019-08-08 PROCEDURE — 22513 PERQ VERTEBRAL AUGMENTATION: CPT | Performed by: NEUROLOGICAL SURGERY

## 2019-08-08 PROCEDURE — 88307 TISSUE EXAM BY PATHOLOGIST: CPT

## 2019-08-08 PROCEDURE — 3700000000 HC ANESTHESIA ATTENDED CARE: Performed by: NEUROLOGICAL SURGERY

## 2019-08-08 PROCEDURE — 2500000003 HC RX 250 WO HCPCS: Performed by: NEUROLOGICAL SURGERY

## 2019-08-08 DEVICE — BONE CEMENT C01B HV-R WITH MIXER  US
Type: IMPLANTABLE DEVICE | Site: BACK | Status: FUNCTIONAL
Brand: KYPHON® HV-R® BONE CEMENT AND KYPHON® MIXER PACK

## 2019-08-08 RX ORDER — DOCUSATE SODIUM 100 MG/1
100 CAPSULE, LIQUID FILLED ORAL 2 TIMES DAILY
Status: DISCONTINUED | OUTPATIENT
Start: 2019-08-08 | End: 2019-08-09 | Stop reason: HOSPADM

## 2019-08-08 RX ORDER — OXYCODONE HYDROCHLORIDE AND ACETAMINOPHEN 5; 325 MG/1; MG/1
1 TABLET ORAL PRN
Status: DISCONTINUED | OUTPATIENT
Start: 2019-08-08 | End: 2019-08-08 | Stop reason: HOSPADM

## 2019-08-08 RX ORDER — ONDANSETRON 2 MG/ML
4 INJECTION INTRAMUSCULAR; INTRAVENOUS EVERY 6 HOURS PRN
Status: DISCONTINUED | OUTPATIENT
Start: 2019-08-08 | End: 2019-08-09 | Stop reason: HOSPADM

## 2019-08-08 RX ORDER — SODIUM CHLORIDE 0.9 % (FLUSH) 0.9 %
10 SYRINGE (ML) INJECTION EVERY 12 HOURS SCHEDULED
Status: DISCONTINUED | OUTPATIENT
Start: 2019-08-08 | End: 2019-08-09 | Stop reason: HOSPADM

## 2019-08-08 RX ORDER — OXYCODONE HYDROCHLORIDE AND ACETAMINOPHEN 5; 325 MG/1; MG/1
2 TABLET ORAL PRN
Status: DISCONTINUED | OUTPATIENT
Start: 2019-08-08 | End: 2019-08-08 | Stop reason: HOSPADM

## 2019-08-08 RX ORDER — SODIUM CHLORIDE 9 MG/ML
INJECTION, SOLUTION INTRAVENOUS CONTINUOUS
Status: DISCONTINUED | OUTPATIENT
Start: 2019-08-08 | End: 2019-08-09

## 2019-08-08 RX ORDER — BUPIVACAINE HYDROCHLORIDE 2.5 MG/ML
INJECTION, SOLUTION EPIDURAL; INFILTRATION; INTRACAUDAL PRN
Status: DISCONTINUED | OUTPATIENT
Start: 2019-08-08 | End: 2019-08-08 | Stop reason: ALTCHOICE

## 2019-08-08 RX ORDER — CEFAZOLIN SODIUM 1 G/3ML
INJECTION, POWDER, FOR SOLUTION INTRAMUSCULAR; INTRAVENOUS PRN
Status: DISCONTINUED | OUTPATIENT
Start: 2019-08-08 | End: 2019-08-08 | Stop reason: SDUPTHER

## 2019-08-08 RX ORDER — LIDOCAINE HYDROCHLORIDE 20 MG/ML
INJECTION, SOLUTION INTRAVENOUS PRN
Status: DISCONTINUED | OUTPATIENT
Start: 2019-08-08 | End: 2019-08-08 | Stop reason: SDUPTHER

## 2019-08-08 RX ORDER — CEFAZOLIN SODIUM 1 G/50ML
1 SOLUTION INTRAVENOUS EVERY 8 HOURS
Status: COMPLETED | OUTPATIENT
Start: 2019-08-08 | End: 2019-08-09

## 2019-08-08 RX ORDER — LIDOCAINE HYDROCHLORIDE AND EPINEPHRINE BITARTRATE 20; .01 MG/ML; MG/ML
INJECTION, SOLUTION SUBCUTANEOUS PRN
Status: DISCONTINUED | OUTPATIENT
Start: 2019-08-08 | End: 2019-08-08 | Stop reason: ALTCHOICE

## 2019-08-08 RX ORDER — MEPERIDINE HYDROCHLORIDE 50 MG/ML
12.5 INJECTION INTRAMUSCULAR; INTRAVENOUS; SUBCUTANEOUS
Status: DISCONTINUED | OUTPATIENT
Start: 2019-08-08 | End: 2019-08-08 | Stop reason: HOSPADM

## 2019-08-08 RX ORDER — SODIUM CHLORIDE 0.9 % (FLUSH) 0.9 %
10 SYRINGE (ML) INJECTION PRN
Status: DISCONTINUED | OUTPATIENT
Start: 2019-08-08 | End: 2019-08-08 | Stop reason: SDUPTHER

## 2019-08-08 RX ORDER — MORPHINE SULFATE 2 MG/ML
2 INJECTION, SOLUTION INTRAMUSCULAR; INTRAVENOUS EVERY 5 MIN PRN
Status: DISCONTINUED | OUTPATIENT
Start: 2019-08-08 | End: 2019-08-08 | Stop reason: HOSPADM

## 2019-08-08 RX ORDER — OXYCODONE HYDROCHLORIDE 5 MG/1
5 TABLET ORAL EVERY 4 HOURS PRN
Status: DISCONTINUED | OUTPATIENT
Start: 2019-08-08 | End: 2019-08-09 | Stop reason: HOSPADM

## 2019-08-08 RX ORDER — SODIUM CHLORIDE 0.9 % (FLUSH) 0.9 %
10 SYRINGE (ML) INJECTION EVERY 12 HOURS SCHEDULED
Status: DISCONTINUED | OUTPATIENT
Start: 2019-08-08 | End: 2019-08-08 | Stop reason: SDUPTHER

## 2019-08-08 RX ORDER — MORPHINE SULFATE 2 MG/ML
2 INJECTION, SOLUTION INTRAMUSCULAR; INTRAVENOUS
Status: DISCONTINUED | OUTPATIENT
Start: 2019-08-08 | End: 2019-08-09 | Stop reason: HOSPADM

## 2019-08-08 RX ORDER — MORPHINE SULFATE 2 MG/ML
1 INJECTION, SOLUTION INTRAMUSCULAR; INTRAVENOUS EVERY 5 MIN PRN
Status: DISCONTINUED | OUTPATIENT
Start: 2019-08-08 | End: 2019-08-08 | Stop reason: HOSPADM

## 2019-08-08 RX ORDER — CEFAZOLIN SODIUM 1 G/50ML
1 SOLUTION INTRAVENOUS
Status: DISCONTINUED | OUTPATIENT
Start: 2019-08-08 | End: 2019-08-08 | Stop reason: SDUPTHER

## 2019-08-08 RX ORDER — PROPOFOL 10 MG/ML
INJECTION, EMULSION INTRAVENOUS PRN
Status: DISCONTINUED | OUTPATIENT
Start: 2019-08-08 | End: 2019-08-08 | Stop reason: SDUPTHER

## 2019-08-08 RX ORDER — ONDANSETRON 2 MG/ML
4 INJECTION INTRAMUSCULAR; INTRAVENOUS
Status: DISCONTINUED | OUTPATIENT
Start: 2019-08-08 | End: 2019-08-08 | Stop reason: HOSPADM

## 2019-08-08 RX ORDER — FENTANYL CITRATE 50 UG/ML
INJECTION, SOLUTION INTRAMUSCULAR; INTRAVENOUS PRN
Status: DISCONTINUED | OUTPATIENT
Start: 2019-08-08 | End: 2019-08-08 | Stop reason: SDUPTHER

## 2019-08-08 RX ORDER — MORPHINE SULFATE 4 MG/ML
4 INJECTION, SOLUTION INTRAMUSCULAR; INTRAVENOUS
Status: DISCONTINUED | OUTPATIENT
Start: 2019-08-08 | End: 2019-08-09 | Stop reason: HOSPADM

## 2019-08-08 RX ORDER — SODIUM CHLORIDE 0.9 % (FLUSH) 0.9 %
10 SYRINGE (ML) INJECTION PRN
Status: DISCONTINUED | OUTPATIENT
Start: 2019-08-08 | End: 2019-08-09 | Stop reason: HOSPADM

## 2019-08-08 RX ORDER — OXYCODONE HYDROCHLORIDE 10 MG/1
10 TABLET ORAL EVERY 4 HOURS PRN
Status: DISCONTINUED | OUTPATIENT
Start: 2019-08-08 | End: 2019-08-09 | Stop reason: HOSPADM

## 2019-08-08 RX ORDER — SODIUM CHLORIDE 9 MG/ML
INJECTION, SOLUTION INTRAVENOUS CONTINUOUS PRN
Status: DISCONTINUED | OUTPATIENT
Start: 2019-08-08 | End: 2019-08-08 | Stop reason: SDUPTHER

## 2019-08-08 RX ADMIN — FENTANYL CITRATE 20 MCG: 50 INJECTION, SOLUTION INTRAMUSCULAR; INTRAVENOUS at 09:38

## 2019-08-08 RX ADMIN — METOPROLOL TARTRATE 25 MG: 25 TABLET ORAL at 21:04

## 2019-08-08 RX ADMIN — CEFAZOLIN 1000 MG: 1 INJECTION, POWDER, FOR SOLUTION INTRAMUSCULAR; INTRAVENOUS at 09:41

## 2019-08-08 RX ADMIN — SODIUM CHLORIDE: 0.9 INJECTION, SOLUTION INTRAVENOUS at 09:15

## 2019-08-08 RX ADMIN — HYDROMORPHONE HYDROCHLORIDE 0.25 MG: 1 INJECTION, SOLUTION INTRAMUSCULAR; INTRAVENOUS; SUBCUTANEOUS at 11:05

## 2019-08-08 RX ADMIN — LIDOCAINE HYDROCHLORIDE 60 MG: 20 INJECTION, SOLUTION INTRAVENOUS at 10:05

## 2019-08-08 RX ADMIN — FENTANYL CITRATE 20 MCG: 50 INJECTION, SOLUTION INTRAMUSCULAR; INTRAVENOUS at 09:44

## 2019-08-08 RX ADMIN — HYDROMORPHONE HYDROCHLORIDE 0.25 MG: 1 INJECTION, SOLUTION INTRAMUSCULAR; INTRAVENOUS; SUBCUTANEOUS at 10:56

## 2019-08-08 RX ADMIN — LIDOCAINE HYDROCHLORIDE 40 MG: 20 INJECTION, SOLUTION INTRAVENOUS at 09:25

## 2019-08-08 RX ADMIN — DOCUSATE SODIUM 100 MG: 100 CAPSULE, LIQUID FILLED ORAL at 21:04

## 2019-08-08 RX ADMIN — SODIUM CHLORIDE: 9 INJECTION, SOLUTION INTRAVENOUS at 12:43

## 2019-08-08 RX ADMIN — PROPOFOL 220 MG: 10 INJECTION, EMULSION INTRAVENOUS at 10:15

## 2019-08-08 RX ADMIN — CEFAZOLIN SODIUM 1 G: 1 SOLUTION INTRAVENOUS at 18:28

## 2019-08-08 RX ADMIN — FENTANYL CITRATE 20 MCG: 50 INJECTION, SOLUTION INTRAMUSCULAR; INTRAVENOUS at 09:25

## 2019-08-08 RX ADMIN — OXYCODONE HYDROCHLORIDE 10 MG: 10 TABLET ORAL at 12:43

## 2019-08-08 RX ADMIN — HYDROMORPHONE HYDROCHLORIDE 0.25 MG: 1 INJECTION, SOLUTION INTRAMUSCULAR; INTRAVENOUS; SUBCUTANEOUS at 10:50

## 2019-08-08 RX ADMIN — SODIUM CHLORIDE: 9 INJECTION, SOLUTION INTRAVENOUS at 22:11

## 2019-08-08 RX ADMIN — FENTANYL CITRATE 20 MCG: 50 INJECTION, SOLUTION INTRAMUSCULAR; INTRAVENOUS at 10:00

## 2019-08-08 RX ADMIN — FENTANYL CITRATE 20 MCG: 50 INJECTION, SOLUTION INTRAMUSCULAR; INTRAVENOUS at 09:53

## 2019-08-08 RX ADMIN — HYDROMORPHONE HYDROCHLORIDE 0.25 MG: 1 INJECTION, SOLUTION INTRAMUSCULAR; INTRAVENOUS; SUBCUTANEOUS at 10:44

## 2019-08-08 RX ADMIN — CARBIDOPA AND LEVODOPA 1 TABLET: 25; 100 TABLET ORAL at 21:04

## 2019-08-08 ASSESSMENT — PULMONARY FUNCTION TESTS
PIF_VALUE: 0

## 2019-08-08 ASSESSMENT — PAIN SCALES - GENERAL
PAINLEVEL_OUTOF10: 0
PAINLEVEL_OUTOF10: 4
PAINLEVEL_OUTOF10: 3
PAINLEVEL_OUTOF10: 6
PAINLEVEL_OUTOF10: 6
PAINLEVEL_OUTOF10: 0
PAINLEVEL_OUTOF10: 4
PAINLEVEL_OUTOF10: 6
PAINLEVEL_OUTOF10: 6
PAINLEVEL_OUTOF10: 5

## 2019-08-08 ASSESSMENT — PAIN DESCRIPTION - ORIENTATION
ORIENTATION: MID

## 2019-08-08 ASSESSMENT — PAIN DESCRIPTION - PROGRESSION
CLINICAL_PROGRESSION: GRADUALLY IMPROVING
CLINICAL_PROGRESSION: NOT CHANGED

## 2019-08-08 ASSESSMENT — PAIN - FUNCTIONAL ASSESSMENT: PAIN_FUNCTIONAL_ASSESSMENT: 0-10

## 2019-08-08 ASSESSMENT — PAIN DESCRIPTION - PAIN TYPE
TYPE: SURGICAL PAIN

## 2019-08-08 ASSESSMENT — PAIN DESCRIPTION - LOCATION
LOCATION: BACK

## 2019-08-08 ASSESSMENT — PAIN DESCRIPTION - FREQUENCY: FREQUENCY: CONTINUOUS

## 2019-08-08 ASSESSMENT — PAIN DESCRIPTION - ONSET: ONSET: ON-GOING

## 2019-08-08 ASSESSMENT — PAIN DESCRIPTION - DESCRIPTORS
DESCRIPTORS: DISCOMFORT
DESCRIPTORS: ACHING;DISCOMFORT;SORE
DESCRIPTORS: ACHING
DESCRIPTORS: ACHING
DESCRIPTORS: DISCOMFORT

## 2019-08-08 NOTE — PROGRESS NOTES
OCCUPATIONAL THERAPY INITIAL EVALUATION      Date:2019  Patient Name: Damion Linda  MRN: [id number]  : 1928  Room: [address] [place], [place] [address]    Evaluating OT: Kaitlin Underwood OTR/L   License #  [id number]    AM-PAC Daily Activity Raw Score:   Recommended Adaptive Equipment:  TBD     Diagnosis: Acute T11 and acute L2 osteoporotic compression fracture. Surgery: 19:1.  Left-sided unilateral percutaneous transpedicular approach to T11  vertebral body for T11 vertebral body bone biopsy and T11 vertebral body  balloon kyphoplasty with use of Medtronic Kyphon balloon and  polymethylmethacrylate. 2.  Left-sided unilateral percutaneous transpedicular approach to L2  vertebral body for L2 vertebral body bone biopsy and L2 vertebral body  balloon kyphoplasty with use of Medtronic Kyphon balloon and  Polymethylmethacrylate. Past Surgical History:   Procedure Laterality Date    CATARACT REMOVAL WITH IMPLANT Left 2016    HERNIA REPAIR Left 2019    LEFT INGUINAL HERNIA REPAIR performed by Shilpi Jean Baptiste MD at [address] [address] Right 2015    ORIF left hip inter & subtrochanteric fx. w/long gamma nail. Eimlie Boogie MD    HYSTERECTOMY  ? robotic    KYPHOSIS SURGERY  2017    T8    KYPHOSIS SURGERY      KYPHOSIS SURGERY N/A 6/10/2019    T11 VERTEBRAL BODY BIOPSY & KYPHOPLASTY performed by Harrison Amaro MD at [facility] 2019    KYPHOPLASTY T11, L2 performed by Harrison Amaro MD at [address] [place] [address] 2019    POSSIBLE LAPAROTOMY  POSSIBLE BOWEL RESECTION performed by Shilpi Jean Baptiste MD at [address] Right 1997    Right [facility]. [facility].  Raul Parker MD       Pertinent Medical History:       Diagnosis Date    Decreased ambulation status     uses cane    Gastric reflux     Hypertension     Left cataract 16    for removal    Parkinson disease (HCC)     tremors of hands/arm

## 2019-08-08 NOTE — H&P
Right [facility]. [facility]. Hannah Quintero MD       Medications Prior to Admission:    Medications Prior to Admission: cyclobenzaprine (FLEXERIL) 10 MG tablet, Take 10 mg by mouth 3 times daily as needed for Muscle spasms  magnesium hydroxide (MILK OF MAGNESIA) 400 MG/5ML suspension, Take by mouth daily as needed for Constipation  oxyCODONE-acetaminophen (PERCOCET) 5-325 MG per tablet, Take 1 tablet by mouth every 4 hours as needed for Pain. bisacodyl (DULCOLAX) 10 MG suppository, Place 10 mg rectally daily as needed for Constipation  Artificial Tear Solution (SOOTHE XP) SOLN, Place 1 drop into both eyes every morning  Artificial Tear Solution (SOOTHE XP) SOLN, Place 1 drop into the left eye 3 times daily  acetaminophen (APAP EXTRA STRENGTH) 500 MG tablet, Take 2 tablets by mouth every 6 hours as needed for Pain  furosemide (LASIX) 20 MG tablet, Take 40 mg by mouth 2 times daily   aspirin 81 MG tablet, Take 81 mg by mouth nightly   Cholecalciferol (VITAMIN D3) 1000 UNITS TABS, Take 1 tablet by mouth every morning   carbidopa-levodopa (SINEMET)  MG per tablet, Take 1 tablet by mouth 2 times daily     Allergies:    Pcn [penicillins]    Social History:    reports that she has never smoked. She has never used smokeless tobacco. She reports that she does not drink alcohol or use drugs. Family History:   family history is not on file. REVIEW OF SYSTEMS:  As above in the HPI, otherwise negative    PHYSICAL EXAM:    Vitals:  /65   Pulse 61   Temp 98.1 °F (36.7 °C) (Temporal)   Resp 18   Ht 5' 2\" (1.575 m)   SpO2 92%   BMI 19.52 kg/m²     General:  Awake, alert, oriented X 3. Well developed, well nourished, well groomed. No apparent distress. HEENT:  Normocephalic, atraumatic. Pupils equal, round, reactive to light. No scleral icterus. No conjunctival injection. Normal lips, teeth, and gums. No nasal discharge.   Neck:  Supple  Heart:  RRR, no murmurs, gallops, rubs  Lungs:  CTA bilaterally, bilat symmetrical expansion, no wheeze, rales, or rhonchi  Abdomen:   Bowel sounds present, soft, nontender, no masses, no organomegaly, no peritoneal signs  Extremities:  No clubbing, cyanosis, or edema  Skin:  Warm and dry, no open lesions or rash  Neuro:  Cranial nerves 2-12 intact, no focal deficits  Breast: deferred  Rectal: deferred  Genitalia:  deferred    LABS:    CBC with Differential:    Lab Results   Component Value Date    WBC 10.3 08/06/2019    RBC 3.56 08/06/2019    HGB 11.3 08/06/2019    HCT 35.3 08/06/2019     08/06/2019    MCV 99.2 08/06/2019    MCH 31.7 08/06/2019    MCHC 32.0 08/06/2019    RDW 15.0 08/06/2019    LYMPHOPCT 7.8 08/06/2019    MONOPCT 6.8 08/06/2019    BASOPCT 0.4 08/06/2019    MONOSABS 0.70 08/06/2019    LYMPHSABS 0.80 08/06/2019    EOSABS 0.36 08/06/2019    BASOSABS 0.04 08/06/2019     CMP:    Lab Results   Component Value Date     08/06/2019    K 3.9 08/06/2019     08/06/2019    CO2 28 08/06/2019    BUN 12 08/06/2019    CREATININE 0.8 08/06/2019    GFRAA >60 08/06/2019    LABGLOM >60 08/06/2019    GLUCOSE 103 08/06/2019    PROT 7.5 08/01/2019    LABALBU 4.2 08/01/2019    CALCIUM 8.4 08/06/2019    BILITOT 0.7 08/01/2019    ALKPHOS 165 08/01/2019    AST 21 08/01/2019    ALT 10 08/01/2019     Magnesium:    Lab Results   Component Value Date    MG 2.0 08/03/2019     Phosphorus:  No results found for: PHOS  PT/INR:    Lab Results   Component Value Date    PROTIME 10.6 06/08/2019    INR 0.9 06/08/2019     Last 3 Troponin:    Lab Results   Component Value Date    TROPONINI <0.01 01/04/2017     U/A:    Lab Results   Component Value Date    COLORU Yellow 08/01/2019    PROTEINU TRACE 08/01/2019    PHUR 7.0 08/01/2019    WBCUA 2-5 08/01/2019    RBCUA 5-10 08/01/2019    BACTERIA NONE 08/01/2019    CLARITYU Clear 08/01/2019    SPECGRAV <=1.005 08/01/2019    LEUKOCYTESUR SMALL 08/01/2019    UROBILINOGEN 0.2 08/01/2019    BILIRUBINUR Negative 08/01/2019    BLOODU MODERATE 08/01/2019

## 2019-08-08 NOTE — CONSULTS
Inpatient Cardiology Consultation      Reason for Consult:  Atrial fibrillation with pause    Consulting Physician: Dr. Dariana Rocha    Requesting Physician:  Dr. Alan Rios    Date of Consultation: 8/8/2019    History of Present Illness:    Ms. Isabel Scott is a 79 y/o female who was originally seen in consultation by Dr. Jonn Regalado on August 2, 2019 at [facility]. She had been admitted with abdominal pain and nausea, and found to have incarcerated small bowel hernia with high grade obstruction of the left inguinal canal and cholelithiasis. Following admission, she was noted to be tachycardic, and subsequent ECG showed atrial fibrillation with intermittent rapid conduction. She was started on intravenous Cardizem and then seen in consult by our practice and later converted spontaneously to SR. She underwent left inguinal herniorrhaphy with mesh placement on August 5, 2019. She was started on metoprolol and abixaban, but anticoagulation was later held after she was found to have T11 and L2 compression fractures. She was seen in consultation by neurosurgery and transferred to the main campus. Prior to transfer, telemetry monitoring showed a 2.8 second pause while in atrial fibrillation; she was seen by Dr. Preston Penny and felt to have no contraindications to planned kyphoplasty. After arrival at [facility], we were again consulted regarding her atrial fibrillation and pause. Currently, she is sleeping after kyphoplasty earlier today. Her family is present. Upon my arrival she is status post kyphoplasty. She is sleeping comfortably. She awakens easily. She denies any chest discomfort or shortness of breath. Past Medical History:    1. Parkinson disease  2. HTN  3. GERD  4. Vitamin D deficiency   5. Frailty   6. Hospitalized 8/01/2019 to 8/07/2019 due to incarcerated small bowel hernia with high grade obstruction.    7. Paroxysmal atrial fibrillation diagnosed 8/02/2019  · [id number] score of 4. [address] held due to acute compression fractures and need for kyphoplasty. 8. Echocardiogram 8/02/2019: Mild concentric left ventricular hypertrophy.  Ejection fraction is visually estimated at 55-65%.  The left atrium is moderately dilated.  Atrial fibrillation.  Mild mitral regurgitation.  The aortic valve appears mildly sclerotic.  Mild aortic regurgitation. No pulmonary hypertension.  No pericardial effusion. 9. Left hip fracture s/p ORIF 11/2015  10. Right knee arthoplasty 1/1997  11. T8 kyphoplasty  12. T11 vertebral body biopsy and kyphoplasty   32. Hysterectomy      Medications Prior to Admit:  Prior to Admission medications    Medication Sig Start Date End Date Taking? Authorizing Provider   cyclobenzaprine (FLEXERIL) 10 MG tablet Take 10 mg by mouth 3 times daily as needed for Muscle spasms    Historical Provider, MD   magnesium hydroxide (MILK OF MAGNESIA) 400 MG/5ML suspension Take by mouth daily as needed for Constipation    Historical Provider, MD   oxyCODONE-acetaminophen (PERCOCET) 5-325 MG per tablet Take 1 tablet by mouth every 4 hours as needed for Pain.     Historical Provider, MD   bisacodyl (DULCOLAX) 10 MG suppository Place 10 mg rectally daily as needed for Constipation    Historical Provider, MD   Artificial Tear Solution (SOOTHE XP) SOLN Place 1 drop into both eyes every morning    Historical Provider, MD   Artificial Tear Solution (SOOTHE XP) SOLN Place 1 drop into the left eye 3 times daily    Historical Provider, MD   acetaminophen (APAP EXTRA STRENGTH) 500 MG tablet Take 2 tablets by mouth every 6 hours as needed for Pain 5/31/19   JERMAN Cramer - CNP   furosemide (LASIX) 20 MG tablet Take 40 mg by mouth 2 times daily  2/8/19   Historical Provider, MD   aspirin 81 MG tablet Take 81 mg by mouth nightly     Historical Provider, MD   Cholecalciferol (VITAMIN D3) 1000 UNITS TABS Take 1 tablet by mouth every morning     Historical Provider, MD

## 2019-08-08 NOTE — ANESTHESIA PRE PROCEDURE
Department of Anesthesiology  Preprocedure Note       Name:  Diane Ramesh   Age:  80 y.o.  :  1928                                          MRN:  [id number]         Date:  2019      Surgeon: Paris Litten):  Maddison Elise MD    Procedure: KYPHOPLASTY T11, L2 (N/A )    Medications prior to admission:   Prior to Admission medications    Medication Sig Start Date End Date Taking? Authorizing Provider   cyclobenzaprine (FLEXERIL) 10 MG tablet Take 10 mg by mouth 3 times daily as needed for Muscle spasms    Historical Provider, MD   magnesium hydroxide (MILK OF MAGNESIA) 400 MG/5ML suspension Take by mouth daily as needed for Constipation    Historical Provider, MD   oxyCODONE-acetaminophen (PERCOCET) 5-325 MG per tablet Take 1 tablet by mouth every 4 hours as needed for Pain.     Historical Provider, MD   bisacodyl (DULCOLAX) 10 MG suppository Place 10 mg rectally daily as needed for Constipation    Historical Provider, MD   Artificial Tear Solution (SOOTHE XP) SOLN Place 1 drop into both eyes every morning    Historical Provider, MD   Artificial Tear Solution (SOOTHE XP) SOLN Place 1 drop into the left eye 3 times daily    Historical Provider, MD   acetaminophen (APAP EXTRA STRENGTH) 500 MG tablet Take 2 tablets by mouth every 6 hours as needed for Pain 19   JERMAN Garcia - CNP   furosemide (LASIX) 20 MG tablet Take 40 mg by mouth 2 times daily  19   Historical Provider, MD   aspirin 81 MG tablet Take 81 mg by mouth nightly     Historical Provider, MD   Cholecalciferol (VITAMIN D3) 1000 UNITS TABS Take 1 tablet by mouth every morning     Historical Provider, MD   carbidopa-levodopa (SINEMET)  MG per tablet Take 1 tablet by mouth 2 times daily     Historical Provider, MD       Current medications:    Current Facility-Administered Medications   Medication Dose Route Frequency Provider Last Rate Last Dose    sodium chloride flush 0.9 % injection 10 mL  10 mL Intravenous 2 times per day Jannie Fuller 3 Encounters:   08/07/19 106 lb 11.2 oz (48.4 kg)   06/10/19 98 lb 6.4 oz (44.6 kg)   06/06/19 100 lb (45.4 kg)     Body mass index is 19.52 kg/m². CBC:   Lab Results   Component Value Date    WBC 10.3 08/06/2019    RBC 3.56 08/06/2019    HGB 11.3 08/06/2019    HCT 35.3 08/06/2019    MCV 99.2 08/06/2019    RDW 15.0 08/06/2019     08/06/2019       CMP:   Lab Results   Component Value Date     08/06/2019    K 3.9 08/06/2019     08/06/2019    CO2 28 08/06/2019    BUN 12 08/06/2019    CREATININE 0.8 08/06/2019    GFRAA >60 08/06/2019    LABGLOM >60 08/06/2019    GLUCOSE 103 08/06/2019    PROT 7.5 08/01/2019    CALCIUM 8.4 08/06/2019    BILITOT 0.7 08/01/2019    ALKPHOS 165 08/01/2019    AST 21 08/01/2019    ALT 10 08/01/2019       POC Tests: No results for input(s): POCGLU, POCNA, POCK, POCCL, POCBUN, POCHEMO, POCHCT in the last 72 hours. Coags:   Lab Results   Component Value Date    PROTIME 10.6 06/08/2019    INR 0.9 06/08/2019    APTT 24.2 06/06/2019       HCG (If Applicable): No results found for: PREGTESTUR, PREGSERUM, HCG, HCGQUANT     ABGs: No results found for: PHART, PO2ART, SYL6XVK, IAJ3YOU, BEART, O9HJGJTY     Type & Screen (If Applicable):  No results found for: LABABO, LABRH    EKG 01/04/2017    Normal sinus rhythm  Possible Left atrial enlargement  Left axis deviation  Left ventricular hypertrophy  Abnormal ECG  No previous ECGs available    CT Scan of Thoracic Spine 06/06/2019    Impression   ALERT:  THIS IS AN ABNORMAL REPORT   1. Acute/subacute compression fracture at what is designated as the   T12 vertebral body, with approximately 40-50% loss of vertebral body   height, worsened when compared with previous study, with interval   development of air in the vertebral body at T12.  Additionally, there   is air noted in the prevertebral soft tissues extending from the   approximate level of T10 vertebral body inferiorly to approximately   the level of the superior T12 vertebral body in the retrocrural   region. Findings are suspicious for possible infection given the lack   of nitrogen gas phenomenon within either the T11-T12 or T12-L1 disc   space. There is retropulsion of fracture fragments at the superior   posterior aspect of T12 of approximately 0.44 cm in the spinal canal.   2. Osteopenia with other stable changes throughout the thoracic spine   as described above, see above for details. 3. Abnormal extensive dilatation of the esophagus, tapering toward   more normal distal esophagus, findings are nonspecific, achalasia   could have the same appearance. An obstruction, esophageal well or   stricture distally to have the same appearance. Surgical   consultation/gastrointestinal consultation and evaluation recommended. 4. Retrosternal extension left thyroid gland incompletely   characterized, further evaluation with dedicated thyroid ultrasound is   recommended.                                       Anesthesia Evaluation  Patient summary reviewed and Nursing notes reviewed no history of anesthetic complications:   Airway: Mallampati: III  TM distance: <3 FB   Neck ROM: full  Mouth opening: < 3 FB Dental:    (+) upper dentures and lower dentures      Pulmonary:Negative Pulmonary ROS breath sounds clear to auscultation                             Cardiovascular:    (+) hypertension:,       ECG reviewed  Rhythm: regular  Rate: normal           Beta Blocker:  Not on Beta Blocker         Neuro/Psych:   (+) neuromuscular disease: Parkinson's disease,              ROS comment: Hx: Chronic bilateral thoracic back pain, tremors to BUEs    S/p kypho in past GI/Hepatic/Renal:   (+) GERD: well controlled, renal disease: CRI,           Endo/Other:                      ROS comment: Hx: Vitamin D deficiency, enlarged thyroid Abdominal:           Vascular: negative vascular ROS. Anesthesia Plan      MAC     ASA 3       Induction: intravenous.       Anesthetic

## 2019-08-08 NOTE — OP NOTE
Next, induction of monitored anesthesia care was then  commenced. Upon completion of induction of monitored anesthesia care,  she received preoperative antibiotics. Her thoracolumbar region was  prepped and draped in the usual sterile fashion. Using biplanar  fluoroscopy, I marked the entry point to the T11 pedicle on the  patient's left side. I infiltrated the skin with lidocaine with  epinephrine 1:200,000. I used a #15-blade to make a stab incision. I  docked the [employer] One-Step Osteo introducer on the facet, advanced it  through the pedicle into the vertebral body. I was able to get across  midline. I removed the inner stylet, left the outer working cannula in  place. I inserted a bone biopsy tool. After obtaining a biopsy, I  inserted a Medtronic Kyphon balloon that was inflated to 200 psi. I  then deflated the balloon and injected a total of 4.5 mL of  polymethylmethacrylate into the vertebral body. I then removed the  outer working cannula and closed the skin with Dermabond. Next, I  identified the entry point to the L2 pedicle on the patient's left side. I infiltrated the skin with lidocaine with epinephrine 1:200,000. I  used a #15-blade to make a stab incision. I docked the [employer]  One-Step Osteo introducer on the facet, advanced it through the pedicle  into the vertebral body. I was able to get across the midline. I  removed the inner stylet, left the outer working cannula in place. I  inserted a bone biopsy tool. After obtaining a biopsy, I inserted a  Medtronic Kyphon balloon that was inflated to 220 psi. I deflated the  balloon and injected a total of 6 mL of polymethylmethacrylate into the  vertebral body. After this was done, I obtained a final AP and lateral  fluoroscopic image. There was no evidence of extravasation of cement. The skin was then closed with Dermabond.   The patient was then flipped  into supine position on her hospital bed, and transported to the  postanesthesia care unit in stable condition. There were no  complications. Counts were correct. I was present for the entire case.         Susan Cagle MD    D: 08/08/2019 10:35:20       T: 08/08/2019 13:57:27     [email or web address]  Job#: [id number]     Doc#: [id number]    CC:

## 2019-08-09 VITALS
DIASTOLIC BLOOD PRESSURE: 68 MMHG | HEIGHT: 62 IN | RESPIRATION RATE: 16 BRPM | HEART RATE: 69 BPM | SYSTOLIC BLOOD PRESSURE: 138 MMHG | TEMPERATURE: 99.5 F | OXYGEN SATURATION: 95 % | BODY MASS INDEX: 19.52 KG/M2

## 2019-08-09 PROCEDURE — 2580000003 HC RX 258: Performed by: PHYSICIAN ASSISTANT

## 2019-08-09 PROCEDURE — 99231 SBSQ HOSP IP/OBS SF/LOW 25: CPT | Performed by: INTERNAL MEDICINE

## 2019-08-09 PROCEDURE — 6370000000 HC RX 637 (ALT 250 FOR IP): Performed by: PHYSICIAN ASSISTANT

## 2019-08-09 PROCEDURE — 2700000000 HC OXYGEN THERAPY PER DAY

## 2019-08-09 PROCEDURE — 6360000002 HC RX W HCPCS: Performed by: PHYSICIAN ASSISTANT

## 2019-08-09 RX ORDER — OXYCODONE HYDROCHLORIDE AND ACETAMINOPHEN 5; 325 MG/1; MG/1
1 TABLET ORAL EVERY 4 HOURS PRN
Qty: 5 TABLET | Refills: 0 | Status: SHIPPED | OUTPATIENT
Start: 2019-08-09 | End: 2019-08-12

## 2019-08-09 RX ORDER — FUROSEMIDE 20 MG/1
20 TABLET ORAL 2 TIMES DAILY
Qty: 60 TABLET | Refills: 3 | DISCHARGE
Start: 2019-08-09 | End: 2022-01-01

## 2019-08-09 RX ORDER — DIMETHICONE, OXYBENZONE, AND PADIMATE O 2; 2.5; 6.6 G/100G; G/100G; G/100G
STICK TOPICAL PRN
Refills: 0 | Status: ON HOLD | DISCHARGE
Start: 2019-08-09 | End: 2021-06-04 | Stop reason: HOSPADM

## 2019-08-09 RX ORDER — PSEUDOEPHEDRINE HCL 30 MG
100 TABLET ORAL 2 TIMES DAILY
DISCHARGE
Start: 2019-08-09 | End: 2021-06-03

## 2019-08-09 RX ORDER — ACETAMINOPHEN 500 MG
1000 TABLET ORAL 2 TIMES DAILY
Qty: 120 TABLET | Refills: 0 | DISCHARGE
Start: 2019-08-09 | End: 2020-08-31 | Stop reason: SDUPTHER

## 2019-08-09 RX ADMIN — SODIUM CHLORIDE: 9 INJECTION, SOLUTION INTRAVENOUS at 05:55

## 2019-08-09 RX ADMIN — CARBIDOPA AND LEVODOPA 1 TABLET: 25; 100 TABLET ORAL at 07:58

## 2019-08-09 RX ADMIN — ACETAMINOPHEN 650 MG: 325 TABLET, FILM COATED ORAL at 05:28

## 2019-08-09 RX ADMIN — CYCLOBENZAPRINE 10 MG: 10 TABLET, FILM COATED ORAL at 12:30

## 2019-08-09 RX ADMIN — CEFAZOLIN SODIUM 1 G: 1 SOLUTION INTRAVENOUS at 02:07

## 2019-08-09 RX ADMIN — DOCUSATE SODIUM 100 MG: 100 CAPSULE, LIQUID FILLED ORAL at 07:59

## 2019-08-09 RX ADMIN — METOPROLOL TARTRATE 25 MG: 25 TABLET ORAL at 07:59

## 2019-08-09 RX ADMIN — CYCLOBENZAPRINE 10 MG: 10 TABLET, FILM COATED ORAL at 07:58

## 2019-08-09 RX ADMIN — ACETAMINOPHEN 650 MG: 325 TABLET, FILM COATED ORAL at 12:27

## 2019-08-09 ASSESSMENT — PAIN DESCRIPTION - LOCATION: LOCATION: BACK

## 2019-08-09 ASSESSMENT — PAIN DESCRIPTION - FREQUENCY: FREQUENCY: CONTINUOUS

## 2019-08-09 ASSESSMENT — PAIN SCALES - GENERAL
PAINLEVEL_OUTOF10: 5
PAINLEVEL_OUTOF10: 0
PAINLEVEL_OUTOF10: 0
PAINLEVEL_OUTOF10: 7
PAINLEVEL_OUTOF10: 0

## 2019-08-09 ASSESSMENT — PAIN DESCRIPTION - ONSET: ONSET: ON-GOING

## 2019-08-09 ASSESSMENT — PAIN DESCRIPTION - ORIENTATION: ORIENTATION: MID

## 2019-08-09 ASSESSMENT — PAIN DESCRIPTION - PAIN TYPE: TYPE: SURGICAL PAIN

## 2019-08-09 ASSESSMENT — PAIN DESCRIPTION - DESCRIPTORS: DESCRIPTORS: ACHING;DISCOMFORT

## 2019-08-09 NOTE — DISCHARGE INSTR - COC
Continuity of Care Form    Patient Name: Kaushal Woo   :  1928  MRN:  [id number]    Admit date:  2019  Discharge date:  19    Code Status Order: Full Code   Advance Directives:   Advance Care Flowsheet Documentation     Date/Time Healthcare Directive Type of Healthcare Directive Copy in [address] Agent's Name Healthcare Agent's Phone Number    19 1643  No, patient does not have an advance directive for healthcare treatment -- -- -- -- --          Admitting Physician:  Royal Banda MD  PCP: Hemanth Wyatt MD    Discharging Nurse: [facility]/Room#: 1766/8921-T  Discharging Unit Phone Number: [phone]    Emergency Contact:   Extended Emergency Contact Information  Primary Emergency Contact: Farshad Ghotra  Address: [address] [place] [address], [address] [place] [address] Phone: [phone]  Mobile Phone: [phone]  Relation: Child  Secondary Emergency Contact: [facility] Phone: [phone]  Mobile Phone: [phone]  Relation: Child  Preferred language: English   needed? No    Past Surgical History:  Past Surgical History:   Procedure Laterality Date    CATARACT REMOVAL WITH IMPLANT Left 2016    HERNIA REPAIR Left 2019    LEFT INGUINAL HERNIA REPAIR performed by Elsa Victoria MD at [facility] Right 2015    ORIF left hip inter & subtrochanteric fx. w/long gamma nail. Arlyn Batista MD    HYSTERECTOMY  ?     robotic    KYPHOSIS SURGERY  2017    T8    KYPHOSIS SURGERY      KYPHOSIS SURGERY N/A 6/10/2019    T11 VERTEBRAL BODY BIOPSY & KYPHOPLASTY performed by Dimitrios Escobar MD at [address] [place] [address] 2019    KYPHOPLASTY T11, L2 performed by Dimitrios Escobar MD at [address] [place] [address] 2019    POSSIBLE LAPAROTOMY  POSSIBLE BOWEL RESECTION performed by Elsa Victoria MD at [place] [address]

## 2019-08-09 NOTE — DISCHARGE SUMMARY
metoprolol tartrate (LOPRESSOR) 25 MG tablet Take 1 tablet by mouth 2 times daily  Qty: 60 tablet, Refills: 3      medicated lip balm (BLISTEX/CARMEX) 2-2.5-6.6 % STCK Apply topically as needed for Dry Lips  Refills: 0      docusate sodium (COLACE, DULCOLAX) 100 MG CAPS Take 100 mg by mouth 2 times daily         CONTINUE these medications which have CHANGED    Details   oxyCODONE-acetaminophen (PERCOCET) 5-325 MG per tablet Take 1 tablet by mouth every 4 hours as needed for Pain for up to 3 days. Qty: 5 tablet, Refills: 0    Comments: Reduce doses taken as pain becomes manageable  Associated Diagnoses: Low back pain, unspecified back pain laterality, unspecified chronicity, with sciatica presence unspecified      acetaminophen (APAP EXTRA STRENGTH) 500 MG tablet Take 2 tablets by mouth 2 times daily  Qty: 120 tablet, Refills: 0      furosemide (LASIX) 20 MG tablet Take 1 tablet by mouth 2 times daily  Qty: 60 tablet, Refills: 3         CONTINUE these medications which have NOT CHANGED    Details   cyclobenzaprine (FLEXERIL) 10 MG tablet Take 10 mg by mouth 3 times daily as needed for Muscle spasms      magnesium hydroxide (MILK OF MAGNESIA) 400 MG/5ML suspension Take by mouth daily as needed for Constipation      bisacodyl (DULCOLAX) 10 MG suppository Place 10 mg rectally daily as needed for Constipation      !! Artificial Tear Solution (SOOTHE XP) SOLN Place 1 drop into both eyes every morning      !! Artificial Tear Solution (SOOTHE XP) SOLN Place 1 drop into the left eye 3 times daily      aspirin 81 MG tablet Take 81 mg by mouth nightly       Cholecalciferol (VITAMIN D3) 1000 UNITS TABS Take 1 tablet by mouth every morning       carbidopa-levodopa (SINEMET)  MG per tablet Take 1 tablet by mouth 2 times daily        ! ! - Potential duplicate medications found. Please discuss with provider.         Activity: activity as tolerated and no lifting, Driving, or Strenuous exercise for 2 weeks  Diet: regular

## 2019-08-26 ENCOUNTER — TELEPHONE (OUTPATIENT)
Dept: NEUROSURGERY | Age: 84
End: 2019-08-26

## 2019-08-29 ENCOUNTER — OFFICE VISIT (OUTPATIENT)
Dept: NEUROSURGERY | Age: 84
End: 2019-08-29

## 2019-08-29 VITALS — SYSTOLIC BLOOD PRESSURE: 108 MMHG | DIASTOLIC BLOOD PRESSURE: 49 MMHG | HEART RATE: 67 BPM

## 2019-08-29 DIAGNOSIS — S32.020D CLOSED COMPRESSION FRACTURE OF L2 LUMBAR VERTEBRA WITH ROUTINE HEALING, SUBSEQUENT ENCOUNTER: Primary | ICD-10-CM

## 2019-08-29 PROCEDURE — 99024 POSTOP FOLLOW-UP VISIT: CPT | Performed by: PHYSICIAN ASSISTANT

## 2019-08-29 RX ORDER — TRAMADOL HYDROCHLORIDE 50 MG/1
50 TABLET ORAL EVERY 6 HOURS PRN
Status: ON HOLD | COMMUNITY
End: 2022-01-01 | Stop reason: SDUPTHER

## 2019-09-09 ENCOUNTER — TELEPHONE (OUTPATIENT)
Dept: NEUROSURGERY | Age: 84
End: 2019-09-09

## 2019-09-25 ENCOUNTER — TELEPHONE (OUTPATIENT)
Dept: NEUROSURGERY | Age: 84
End: 2019-09-25

## 2019-09-26 DIAGNOSIS — S32.020D CLOSED COMPRESSION FRACTURE OF L2 LUMBAR VERTEBRA WITH ROUTINE HEALING, SUBSEQUENT ENCOUNTER: ICD-10-CM

## 2019-09-26 DIAGNOSIS — S22.080D CLOSED WEDGE COMPRESSION FRACTURE OF ELEVENTH THORACIC VERTEBRA WITH ROUTINE HEALING, SUBSEQUENT ENCOUNTER: Primary | ICD-10-CM

## 2019-09-27 DIAGNOSIS — S22.080D CLOSED WEDGE COMPRESSION FRACTURE OF ELEVENTH THORACIC VERTEBRA WITH ROUTINE HEALING, SUBSEQUENT ENCOUNTER: ICD-10-CM

## 2019-09-27 DIAGNOSIS — S32.020D CLOSED COMPRESSION FRACTURE OF L2 LUMBAR VERTEBRA WITH ROUTINE HEALING, SUBSEQUENT ENCOUNTER: ICD-10-CM

## 2019-09-30 ENCOUNTER — TELEPHONE (OUTPATIENT)
Dept: NEUROSURGERY | Age: 84
End: 2019-09-30

## 2019-11-15 NOTE — PROGRESS NOTES
GENERAL SURGERY  DAILY PROGRESS NOTE  8/6/2019    Subjective:  No significant overnight events. Urinating frequently post op. No nausea or vomiting. Passing some flatus but no bowel movement yet. Tolerating diet. Objective:  /83   Pulse 75   Temp 97.6 °F (36.4 °C) (Oral)   Resp 16   Ht 5' 2\" (1.575 m)   Wt 107 lb 4.8 oz (48.7 kg)   SpO2 96%   BMI 19.63 kg/m²     GENERAL:  Laying in bed, awake, alert, cooperative, no apparent distress  HEAD: Normocephalic, atraumatic  EYES: Grossly normal  LUNGS:  No increased work of breathing  CARDIOVASCULAR:  RR  ABDOMEN:  Soft, appropriately-tender, non-distended. Incisions clean. EXTREMITIES: No edema or swelling  SKIN: Warm and dry     CBC:   Lab Results   Component Value Date    WBC 10.7 08/05/2019    RBC 3.44 08/05/2019    HGB 10.8 08/05/2019    HCT 34.3 08/05/2019    MCV 99.7 08/05/2019    MCH 31.4 08/05/2019    MCHC 31.5 08/05/2019    RDW 15.5 08/05/2019     08/05/2019    MPV 10.9 08/05/2019     BMP:    Lab Results   Component Value Date     08/05/2019    K 4.1 08/05/2019     08/05/2019    CO2 28 08/05/2019    BUN 13 08/05/2019    LABALBU 4.2 08/01/2019    CREATININE 0.9 08/05/2019    CALCIUM 8.6 08/05/2019    GFRAA >60 08/05/2019    LABGLOM 59 08/05/2019    GLUCOSE 121 08/05/2019        Assessment/Plan:  80 y.o. female s/p open inguinal hernia repair on 8/5    Continue diet as tolerated  Encourage ambulation  Discharge planning    Discussed with Dr. Larose Ou    Electronically signed by Tom Fay MD on 8/6/2019 at 6:37 AM    Seen/examined  Doing well  From a surgical standpoint, can be discharged  Would normally clear for initiation of anticoagulation, but I have significant concerns at her age, recent surgery; increased risk of falls.   Technically cleared for anticoagulation but I don't recommend it  JSGadyMD Debility

## 2020-01-17 ENCOUNTER — HOSPITAL ENCOUNTER (OUTPATIENT)
Age: 85
Discharge: HOME | End: 2020-01-19
Payer: MEDICARE

## 2020-01-17 ENCOUNTER — HOSPITAL ENCOUNTER (OUTPATIENT)
Dept: GENERAL RADIOLOGY | Age: 85
Discharge: HOME | End: 2020-01-19
Payer: MEDICARE

## 2020-01-17 PROCEDURE — 72110 X-RAY EXAM L-2 SPINE 4/>VWS: CPT

## 2020-01-17 PROCEDURE — 72072 X-RAY EXAM THORAC SPINE 3VWS: CPT

## 2020-01-23 ENCOUNTER — HOSPITAL ENCOUNTER (OUTPATIENT)
Dept: MRI IMAGING | Age: 85
Discharge: HOME | End: 2020-01-25
Payer: MEDICARE

## 2020-01-23 PROCEDURE — 72148 MRI LUMBAR SPINE W/O DYE: CPT

## 2020-03-12 ENCOUNTER — OFFICE VISIT (OUTPATIENT)
Dept: NEUROSURGERY | Age: 85
End: 2020-03-12
Payer: MEDICARE

## 2020-03-12 PROCEDURE — G8484 FLU IMMUNIZE NO ADMIN: HCPCS | Performed by: PHYSICIAN ASSISTANT

## 2020-03-12 PROCEDURE — 1036F TOBACCO NON-USER: CPT | Performed by: PHYSICIAN ASSISTANT

## 2020-03-12 PROCEDURE — 1123F ACP DISCUSS/DSCN MKR DOCD: CPT | Performed by: PHYSICIAN ASSISTANT

## 2020-03-12 PROCEDURE — 99213 OFFICE O/P EST LOW 20 MIN: CPT | Performed by: PHYSICIAN ASSISTANT

## 2020-03-12 PROCEDURE — G8428 CUR MEDS NOT DOCUMENT: HCPCS | Performed by: PHYSICIAN ASSISTANT

## 2020-03-12 PROCEDURE — G8420 CALC BMI NORM PARAMETERS: HCPCS | Performed by: PHYSICIAN ASSISTANT

## 2020-03-12 PROCEDURE — 4040F PNEUMOC VAC/ADMIN/RCVD: CPT | Performed by: PHYSICIAN ASSISTANT

## 2020-03-12 PROCEDURE — 1090F PRES/ABSN URINE INCON ASSESS: CPT | Performed by: PHYSICIAN ASSISTANT

## 2020-03-12 NOTE — PROGRESS NOTES
Post Operative Follow-up    Patient is status post: kyphoplasty last year with no significant improvement. She had new MRI of her lumbar spine reveals T10, L1, L3, and L4 compression fractures. Physical Exam  Alert and Oriented X 3  PERRLA, EOMI  FROST 5/5  Wound: C/D/I    A/P: patient is s/p T10, L1, L3, and L4 compression fracture that are subacute on MRI. Will order soft TLSO due to the fractures being at least 2-3 months old and her poor response to her kyphoplasty last year.

## 2020-08-31 ENCOUNTER — APPOINTMENT (OUTPATIENT)
Dept: GENERAL RADIOLOGY | Age: 85
End: 2020-08-31
Payer: MEDICARE

## 2020-08-31 ENCOUNTER — HOSPITAL ENCOUNTER (EMERGENCY)
Age: 85
Discharge: HOME | End: 2020-08-31
Attending: EMERGENCY MEDICINE
Payer: MEDICARE

## 2020-08-31 ENCOUNTER — TELEPHONE (OUTPATIENT)
Dept: ORTHOPEDIC SURGERY | Age: 85
End: 2020-08-31

## 2020-08-31 ENCOUNTER — HOSPITAL ENCOUNTER (OUTPATIENT)
Dept: GENERAL RADIOLOGY | Age: 85
Discharge: HOME | End: 2020-09-02
Payer: MEDICARE

## 2020-08-31 ENCOUNTER — HOSPITAL ENCOUNTER (OUTPATIENT)
Age: 85
Discharge: HOME | End: 2020-09-02
Payer: MEDICARE

## 2020-08-31 VITALS
OXYGEN SATURATION: 98 % | HEART RATE: 72 BPM | DIASTOLIC BLOOD PRESSURE: 58 MMHG | SYSTOLIC BLOOD PRESSURE: 158 MMHG | TEMPERATURE: 97.1 F | RESPIRATION RATE: 17 BRPM

## 2020-08-31 LAB
ALBUMIN SERPL-MCNC: 4.2 G/DL (ref 3.5–5.2)
ALP BLD-CCNC: 87 U/L (ref 35–104)
ALT SERPL-CCNC: <5 U/L (ref 0–32)
ANION GAP SERPL CALCULATED.3IONS-SCNC: 13 MMOL/L (ref 7–16)
AST SERPL-CCNC: 17 U/L (ref 0–31)
BASOPHILS ABSOLUTE: 0.03 E9/L (ref 0–0.2)
BASOPHILS RELATIVE PERCENT: 0.4 % (ref 0–2)
BILIRUB SERPL-MCNC: 0.6 MG/DL (ref 0–1.2)
BUN BLDV-MCNC: 28 MG/DL (ref 8–23)
CALCIUM SERPL-MCNC: 9.7 MG/DL (ref 8.6–10.2)
CHLORIDE BLD-SCNC: 98 MMOL/L (ref 98–107)
CO2: 29 MMOL/L (ref 22–29)
CREAT SERPL-MCNC: 1.2 MG/DL (ref 0.5–1)
EKG ATRIAL RATE: 75 BPM
EKG P AXIS: 90 DEGREES
EKG P-R INTERVAL: 184 MS
EKG Q-T INTERVAL: 420 MS
EKG QRS DURATION: 110 MS
EKG QTC CALCULATION (BAZETT): 469 MS
EKG R AXIS: 8 DEGREES
EKG T AXIS: 84 DEGREES
EKG VENTRICULAR RATE: 75 BPM
EOSINOPHILS ABSOLUTE: 0.09 E9/L (ref 0.05–0.5)
EOSINOPHILS RELATIVE PERCENT: 1.1 % (ref 0–6)
GFR AFRICAN AMERICAN: 51
GFR NON-AFRICAN AMERICAN: 42 ML/MIN/1.73
GLUCOSE BLD-MCNC: 123 MG/DL (ref 74–99)
HCT VFR BLD CALC: 42.6 % (ref 34–48)
HEMOGLOBIN: 13.5 G/DL (ref 11.5–15.5)
IMMATURE GRANULOCYTES #: 0.03 E9/L
IMMATURE GRANULOCYTES %: 0.4 % (ref 0–5)
LYMPHOCYTES ABSOLUTE: 1.34 E9/L (ref 1.5–4)
LYMPHOCYTES RELATIVE PERCENT: 16.3 % (ref 20–42)
MAGNESIUM: 2.3 MG/DL (ref 1.6–2.6)
MCH RBC QN AUTO: 30.9 PG (ref 26–35)
MCHC RBC AUTO-ENTMCNC: 31.7 % (ref 32–34.5)
MCV RBC AUTO: 97.5 FL (ref 80–99.9)
MONOCYTES ABSOLUTE: 0.59 E9/L (ref 0.1–0.95)
MONOCYTES RELATIVE PERCENT: 7.2 % (ref 2–12)
NEUTROPHILS ABSOLUTE: 6.12 E9/L (ref 1.8–7.3)
NEUTROPHILS RELATIVE PERCENT: 74.6 % (ref 43–80)
PDW BLD-RTO: 13.9 FL (ref 11.5–15)
PLATELET # BLD: 164 E9/L (ref 130–450)
PMV BLD AUTO: 13 FL (ref 7–12)
POTASSIUM SERPL-SCNC: 4.2 MMOL/L (ref 3.5–5)
RBC # BLD: 4.37 E12/L (ref 3.5–5.5)
SODIUM BLD-SCNC: 140 MMOL/L (ref 132–146)
TOTAL PROTEIN: 7.3 G/DL (ref 6.4–8.3)
TROPONIN: 0.02 NG/ML (ref 0–0.03)
WBC # BLD: 8.2 E9/L (ref 4.5–11.5)

## 2020-08-31 PROCEDURE — 73590 X-RAY EXAM OF LOWER LEG: CPT

## 2020-08-31 PROCEDURE — 85025 COMPLETE CBC W/AUTO DIFF WBC: CPT

## 2020-08-31 PROCEDURE — 6370000000 HC RX 637 (ALT 250 FOR IP): Performed by: EMERGENCY MEDICINE

## 2020-08-31 PROCEDURE — 6360000002 HC RX W HCPCS: Performed by: EMERGENCY MEDICINE

## 2020-08-31 PROCEDURE — 73502 X-RAY EXAM HIP UNI 2-3 VIEWS: CPT

## 2020-08-31 PROCEDURE — 93010 ELECTROCARDIOGRAM REPORT: CPT | Performed by: INTERNAL MEDICINE

## 2020-08-31 PROCEDURE — 99283 EMERGENCY DEPT VISIT LOW MDM: CPT

## 2020-08-31 PROCEDURE — 80053 COMPREHEN METABOLIC PANEL: CPT

## 2020-08-31 PROCEDURE — 83735 ASSAY OF MAGNESIUM: CPT

## 2020-08-31 PROCEDURE — 96374 THER/PROPH/DIAG INJ IV PUSH: CPT

## 2020-08-31 PROCEDURE — 84484 ASSAY OF TROPONIN QUANT: CPT

## 2020-08-31 PROCEDURE — 93005 ELECTROCARDIOGRAM TRACING: CPT | Performed by: EMERGENCY MEDICINE

## 2020-08-31 PROCEDURE — 73564 X-RAY EXAM KNEE 4 OR MORE: CPT

## 2020-08-31 RX ORDER — ACETAMINOPHEN 500 MG
1000 TABLET ORAL 2 TIMES DAILY
Qty: 120 TABLET | Refills: 0 | Status: SHIPPED | OUTPATIENT
Start: 2020-08-31

## 2020-08-31 RX ORDER — ACETAMINOPHEN 325 MG/1
650 TABLET ORAL ONCE
Status: COMPLETED | OUTPATIENT
Start: 2020-08-31 | End: 2020-08-31

## 2020-08-31 RX ORDER — KETOROLAC TROMETHAMINE 30 MG/ML
15 INJECTION, SOLUTION INTRAMUSCULAR; INTRAVENOUS ONCE
Status: COMPLETED | OUTPATIENT
Start: 2020-08-31 | End: 2020-08-31

## 2020-08-31 RX ADMIN — KETOROLAC TROMETHAMINE 15 MG: 30 INJECTION, SOLUTION INTRAMUSCULAR at 16:58

## 2020-08-31 RX ADMIN — ACETAMINOPHEN 650 MG: 325 TABLET, FILM COATED ORAL at 16:58

## 2020-08-31 ASSESSMENT — PAIN SCALES - GENERAL
PAINLEVEL_OUTOF10: 6
PAINLEVEL_OUTOF10: 8
PAINLEVEL_OUTOF10: 4

## 2020-08-31 ASSESSMENT — PAIN DESCRIPTION - ORIENTATION: ORIENTATION: LEFT

## 2020-08-31 ASSESSMENT — PAIN DESCRIPTION - PAIN TYPE: TYPE: ACUTE PAIN

## 2020-08-31 ASSESSMENT — PAIN DESCRIPTION - LOCATION: LOCATION: KNEE

## 2020-08-31 NOTE — ED PROVIDER NOTES
Department of Emergency Medicine   ED  Provider Note  Admit Date/RoomTime: 8/31/2020  1:43 PM  ED Room: 13/13          History of Present Illness:  8/31/20, Time: 6:13 PM EDT  Chief Complaint   Patient presents with    Knee Pain     Pt sent in by Dr. Elodia Rice for knee pain after getting into the car and twisting her left knee. During triage patients HR is 32. Humera Gaines is a 80 y.o. female presenting to the ED for left knee pain, beginning a few hours. The complaint has been intermittent, moderate in severity, and worsened by attempting to ambulate. Patient presents for left knee pain. She reports she was getting into a vehicle when she twisted her left knee trying to get in. Did not fall or hit the ground. She complains of pain on the lateral aspect the left knee going up to her left hip. She has not attempted to ambulate it. She is not yet take anything for it. She went to see her PCP who directed her to come in for further evaluation treatment. History of Parkinson's, she does report she took her medicines this morning. In triage her heart rate rate is 30, she denies syncope near syncopal lightheadedness. An EKG was obtained and she was brought back. Review of Systems:   Pertinent positives and negatives are stated within HPI, all other systems reviewed and are negative.        --------------------------------------------- PAST HISTORY ---------------------------------------------  Past Medical History:  has a past medical history of Decreased ambulation status, Gastric reflux, Hypertension, Left cataract, Parkinson disease ([facility] [address] and Vitamin D deficiency. Past Surgical History:  has a past surgical history that includes Hip fracture surgery (Right, 11/16/2015); Hysterectomy (2011?); Tonsillectomy; Cataract removal with implant (Left, 12/06/2016); Total knee arthroplasty (Right, 01/16/1997); Kyphosis surgery (01/11/2017);  Kyphosis surgery; Kyphosis surgery (N/A, 6/10/2019); hernia repair (Left, 8/5/2019); LAPAROTOMY EXPLORATORY (N/A, 8/5/2019); and Kyphosis surgery (N/A, 8/8/2019). Social History:  reports that she has never smoked. She has never used smokeless tobacco. She reports that she does not drink alcohol or use drugs. Family History: family history is not on file. . Unless otherwise noted, family history is non contributory    The patients home medications have been reviewed. Allergies: Pcn [penicillins]        ---------------------------------------------------PHYSICAL EXAM--------------------------------------    Constitutional/General: Alert and oriented x3  Head: Normocephalic and atraumatic  Eyes: PERRL, EOMI, sclera non icteric  Mouth: Oropharynx clear, handling secretions, no trismus, no asymmetry of the posterior oropharynx or uvular edema  Neck: Supple, full ROM, no stridor, no meningeal signs  Respiratory: Lungs clear to auscultation bilaterally,Not in respiratory distress  Cardiovascular: Irregular with frequent PVCs 2+ distal pulses. Equal extremity pulses. Chest: No chest wall tenderness  GI:  Abdomen Soft, Non tender, Non distended. No rebound, guarding, or rigidity. Musculoskeletal: Moves all extremities x 4. Warm and well perfused, no clubbing, cyanosis, or edema. Capillary refill <3 seconds she does complain of pain in the left lateral aspect of the knee, no crepitus or deformity. Decreased range of motion secondary to pain. Integument: skin warm and dry. No rashes. Neurologic: GCS 15, resting parkinsonian tremor noted  Psychiatric: Normal Affect      EKG: Interpreted by emergency department physician, Dr. Malu Cuello   This EKG is signed and interpreted by me. Rate: 75  Rhythm: Sinus rhythm with frequent PVCs  Interpretation: Sinus rhythm with frequent PVCs, VA is 184, QRS is 110, QTc is 469, nonspecific findings there is no prior for comparison  Comparison: no previous EKG available    EKG #2 @ 1433:   This EKG is signed and interpreted by me.    Rate: 78  Rhythm: Sinus  Interpretation: Sinus with frequent PVCs, motion artifact, left axis, nonspecific T changes, no PVCs of decrease in frequency compared to EKG #1     -------------------------------------------------- RESULTS -------------------------------------------------  I have personally reviewed all laboratory and imaging results for this patient. Results are listed below.      LABS: (Lab results interpreted by me)  Results for orders placed or performed during the hospital encounter of 08/31/20   Troponin   Result Value Ref Range    Troponin 0.02 0.00 - 0.03 ng/mL   CBC Auto Differential   Result Value Ref Range    WBC 8.2 4.5 - 11.5 E9/L    RBC 4.37 3.50 - 5.50 E12/L    Hemoglobin 13.5 11.5 - 15.5 g/dL    Hematocrit 42.6 34.0 - 48.0 %    MCV 97.5 80.0 - 99.9 fL    MCH 30.9 26.0 - 35.0 pg    MCHC 31.7 (L) 32.0 - 34.5 %    RDW 13.9 11.5 - 15.0 fL    Platelets 954 054 - 409 E9/L    MPV 13.0 (H) 7.0 - 12.0 fL    Neutrophils % 74.6 43.0 - 80.0 %    Immature Granulocytes % 0.4 0.0 - 5.0 %    Lymphocytes % 16.3 (L) 20.0 - 42.0 %    Monocytes % 7.2 2.0 - 12.0 %    Eosinophils % 1.1 0.0 - 6.0 %    Basophils % 0.4 0.0 - 2.0 %    Neutrophils Absolute 6.12 1.80 - 7.30 E9/L    Immature Granulocytes # 0.03 E9/L    Lymphocytes Absolute 1.34 (L) 1.50 - 4.00 E9/L    Monocytes Absolute 0.59 0.10 - 0.95 E9/L    Eosinophils Absolute 0.09 0.05 - 0.50 E9/L    Basophils Absolute 0.03 0.00 - 0.20 E9/L   Comprehensive Metabolic Panel   Result Value Ref Range    Sodium 140 132 - 146 mmol/L    Potassium 4.2 3.5 - 5.0 mmol/L    Chloride 98 98 - 107 mmol/L    CO2 29 22 - 29 mmol/L    Anion Gap 13 7 - 16 mmol/L    Glucose 123 (H) 74 - 99 mg/dL    BUN 28 (H) 8 - 23 mg/dL    CREATININE 1.2 (H) 0.5 - 1.0 mg/dL    GFR Non-African American 42 >=60 mL/min/1.73    GFR African American 51     Calcium 9.7 8.6 - 10.2 mg/dL    Total Protein 7.3 6.4 - 8.3 g/dL    Alb 4.2 3.5 - 5.2 g/dL    Total Bilirubin 0.6 0.0 - 1.2 mg/dL    Alkaline encounters were reviewed. ------------------------------ ED COURSE/MEDICAL DECISION MAKING----------------------  Medications   ketorolac (TORADOL) injection 15 mg (15 mg Intravenous Given 8/31/20 1658)   acetaminophen (TYLENOL) tablet 650 mg (650 mg Oral Given 8/31/20 1658)                    Medical Decision Making:     I, Dr. Emma Avendano in the primary provider of record    Patient's heart rate is in the 70s back here, EKG was repeated. After pain control she states he feels much better is ambulatory with her walker. She states she has Tylenol at home. Will discharge home with outpatient follow-up return for worsening signs or symptoms. Re-Evaluations:          Re-evaluation. Patients symptoms are improving  Repeat physical examination is improved        This patient's ED course included: a personal history and physicial examination, re-evaluation prior to disposition, IV medications, cardiac monitoring, continuous pulse oximetry and complex medical decision making and emergency management    This patient has remained hemodynamically stable during their ED course. Counseling: The emergency provider has spoken with the patient and discussed todays results, in addition to providing specific details for the plan of care and counseling regarding the diagnosis and prognosis. Questions are answered at this time and they are agreeable with the plan.       --------------------------------- IMPRESSION AND DISPOSITION ---------------------------------    IMPRESSION  1. Acute pain of left knee        DISPOSITION  Disposition: Discharge to home  Patient condition is stable        NOTE: This report was transcribed using voice recognition software.  Every effort was made to ensure accuracy; however, inadvertent computerized transcription errors may be present       Danisha Ceballos DO  08/31/20 [id number]

## 2020-08-31 NOTE — ED NOTES
Bed: 13  Expected date:   Expected time:   Means of arrival:   Comments:  edwin Alicea RN  08/31/20 4776

## 2020-08-31 NOTE — ED NOTES
Patient was able to ambulate well with assist of walker. Reports does have walker at home and daughter will be staying with her for the next couple days to help her with things at home.       Joe Carranza RN  08/31/20 9256

## 2020-08-31 NOTE — TELEPHONE ENCOUNTER
8/31/2020 12:22 pm Dr. Michela De phoned office / requesting to speak with Amelia Poon. Dr. Joselo Anand reports this patient, 80 yrs is currently in his office with left lower extremity pain. Patient felt a snap. Patient is unable to walk. Patient is on Coumadin. X-rays tib-fib negative. Informed Dr. Joselo Anand: Amelia Poon has left the office for the day. Send patient to [facility] For evaluation. Dr. Joselo Anand is in agreement. Informed Dr. Joselo Anand: Will send message to Amelia Poon.

## 2020-09-01 LAB
EKG ATRIAL RATE: 78 BPM
EKG P AXIS: 85 DEGREES
EKG P-R INTERVAL: 188 MS
EKG Q-T INTERVAL: 412 MS
EKG QRS DURATION: 74 MS
EKG QTC CALCULATION (BAZETT): 469 MS
EKG R AXIS: -30 DEGREES
EKG T AXIS: 64 DEGREES
EKG VENTRICULAR RATE: 78 BPM

## 2020-09-01 PROCEDURE — 93010 ELECTROCARDIOGRAM REPORT: CPT | Performed by: INTERNAL MEDICINE

## 2020-09-21 ENCOUNTER — OFFICE VISIT (OUTPATIENT)
Dept: ORTHOPEDIC SURGERY | Age: 85
End: 2020-09-21
Payer: MEDICARE

## 2020-09-21 VITALS — WEIGHT: 93 LBS | HEIGHT: 62 IN | BODY MASS INDEX: 17.11 KG/M2 | TEMPERATURE: 96.9 F

## 2020-09-21 PROBLEM — M17.12 PRIMARY OSTEOARTHRITIS OF LEFT KNEE: Status: ACTIVE | Noted: 2020-09-21

## 2020-09-21 PROCEDURE — 1036F TOBACCO NON-USER: CPT | Performed by: ORTHOPAEDIC SURGERY

## 2020-09-21 PROCEDURE — G8427 DOCREV CUR MEDS BY ELIG CLIN: HCPCS | Performed by: ORTHOPAEDIC SURGERY

## 2020-09-21 PROCEDURE — 1123F ACP DISCUSS/DSCN MKR DOCD: CPT | Performed by: ORTHOPAEDIC SURGERY

## 2020-09-21 PROCEDURE — 1090F PRES/ABSN URINE INCON ASSESS: CPT | Performed by: ORTHOPAEDIC SURGERY

## 2020-09-21 PROCEDURE — G8419 CALC BMI OUT NRM PARAM NOF/U: HCPCS | Performed by: ORTHOPAEDIC SURGERY

## 2020-09-21 PROCEDURE — 99203 OFFICE O/P NEW LOW 30 MIN: CPT | Performed by: ORTHOPAEDIC SURGERY

## 2020-09-21 PROCEDURE — 4040F PNEUMOC VAC/ADMIN/RCVD: CPT | Performed by: ORTHOPAEDIC SURGERY

## 2020-09-21 RX ORDER — WARFARIN SODIUM 3 MG/1
3 TABLET ORAL
COMMUNITY
Start: 2020-09-15 | End: 2021-01-01

## 2020-09-21 NOTE — PROGRESS NOTES
Chief Complaint:   Chief Complaint   Patient presents with    Foot Pain     Injured left foot 1 month ago getting into a car. Was unable to bear weight for 3 weeks after injury. Seen at [facility] on 8/31/2020 for Lt knee pain. Patient states pain starts in left foot and radiates to left knee. States increase in swelling left lower leg since injury. X-rays done at [address] lower leg, knee and hip. No Lt foot X-rays. HPI   77-year-old woman here for evaluation of left lower extremity pain and swelling. Had a twisting injury left lower extremity getting out of the car. On 8/31/2020 she was evaluated prior primary care and subsequently went to the ER where she had x-rays of left hip femur knee and tibia. No acute bony injuries identified. Several years ago she had long cephalo-medullary nail placed by me for intertrochanteric fracture. Fracture has healed and no new injury was detected. Severe degenerative changes were noted in the left knee. At this point she is having some chronic left knee pain, primary concern of the patient and her daughter however his pain and swelling left foot which was not x-rayed previously. She lives in her own home with her son living in the house.     Patient Active Problem List   Diagnosis    Subtrochanteric fracture of right femur ([address]    Parkinson's disease ([address]    Renal insufficiency    Hypertension    Abdominal pain    Chronic bilateral thoracic back pain    Thoracic compression fracture, closed, initial encounter ([address]    SBO (small bowel obstruction) ([address]    Red blood cell antibody positive    Moderate protein-calorie malnutrition (HCC)    [facility]ression [facility] of [facility]2 vertebra ([facility]    Lumbar compression fracture, closed, initial encounter ([address]    PAF (paroxysmal atrial fibrillation) ([facility])    Low back pain    Primary osteoarthritis of left knee       Past Medical History:   Diagnosis Date    Decreased ambulation status     uses cane    Gastric reflux     Hypertension     Left cataract 8-11-16    for removal    Parkinson disease ([address]     tremors of hands/arm / left leg    Vitamin D deficiency        Past Surgical History:   Procedure Laterality Date    CATARACT REMOVAL WITH IMPLANT Left 12/06/2016    HERNIA REPAIR Left 8/5/2019    LEFT INGUINAL HERNIA REPAIR performed by Temo Cai MD at [facility] Right 11/16/2015    ORIF left hip inter & subtrochanteric fx. w/long gamma nail. Benita Canales MD    HYSTERECTOMY  2011? robotic    KYPHOSIS SURGERY  01/11/2017    T8    KYPHOSIS SURGERY      KYPHOSIS SURGERY N/A 6/10/2019    T11 VERTEBRAL BODY BIOPSY & KYPHOPLASTY performed by Wyatt Cleary MD at [facility] 8/8/2019    KYPHOPLASTY T11, L2 performed by Wyatt Cleary MD at [facility] 8/5/2019    POSSIBLE LAPAROTOMY  POSSIBLE BOWEL RESECTION performed by eTmo Cai MD at [address] Right 01/16/1997    Right TKA. Al Yap. MD Rory       Current Outpatient Medications   Medication Sig Dispense Refill    JANTOVEN 1 MG tablet TAKE ONE TABLET BY MOUTH EVERY DAY      acetaminophen (APAP EXTRA STRENGTH) 500 MG tablet Take 2 tablets by mouth 2 times daily 120 tablet 0    traMADol (ULTRAM) 50 MG tablet Take 50 mg by mouth every 6 hours as needed for Pain.       furosemide (LASIX) 20 MG tablet Take 1 tablet by mouth 2 times daily 60 tablet 3    metoprolol tartrate (LOPRESSOR) 25 MG tablet Take 1 tablet by mouth 2 times daily 60 tablet 3    medicated lip balm (BLISTEX/CARMEX) 2-2.5-6.6 % STCK Apply topically as needed for Dry Lips  0    docusate sodium (COLACE, DULCOLAX) 100 MG CAPS Take 100 mg by mouth 2 times daily      magnesium hydroxide (MILK OF MAGNESIA) 400 MG/5ML suspension Take by mouth daily as needed for Constipation      bisacodyl (DULCOLAX) 10 MG suppository Place 10 mg rectally daily as needed for Constipation      Artificial Tear Solution (SOOTHE XP) SOLN Place 1 drop into the left eye 3 times daily      Cholecalciferol (VITAMIN D3) 1000 UNITS TABS Take 1 tablet by mouth every morning       carbidopa-levodopa (SINEMET)  MG per tablet Take 1 tablet by mouth 2 times daily       cyclobenzaprine (FLEXERIL) 10 MG tablet Take 10 mg by mouth 3 times daily as needed for Muscle spasms       No current facility-administered medications for this visit. Allergies   Allergen Reactions    Pcn [Penicillins] Itching       Social History     Socioeconomic History    Marital status:      Spouse name: Not on file    Number of children: Not on file    Years of education: Not on file    Highest education level: Not on file   Occupational History    Not on file   Social Needs    Financial resource strain: Not on file    Food insecurity     Worry: Not on file     Inability: Not on file    Transportation needs     Medical: Not on file     Non-medical: Not on file   Tobacco Use    Smoking status: Never Smoker    Smokeless tobacco: Never Used   Substance and Sexual Activity    Alcohol use: No    Drug use: No    Sexual activity: Not on file   Lifestyle    Physical activity     Days per week: Not on file     Minutes per session: Not on file    Stress: Not on file   Relationships    Social connections     Talks on phone: Not on file     Gets together: Not on file     Attends Islam service: Not on file     Active member of club or organization: Not on file     Attends meetings of clubs or organizations: Not on file     Relationship status: Not on file    Intimate partner violence     Fear of current or ex partner: Not on file     Emotionally abused: Not on file     Physically abused: Not on file     Forced sexual activity: Not on file   Other Topics Concern    Not on file   Social History Narrative    Not on file       No family history on file.       Review of Systems   No fever, chills, or other constitutionalsymptoms. No numbness or other neuro symptoms. No chest pain. No dyspnea. [unfilled]   Presents today in a wheelchair. Elderly and frail. No pain with left hip rotation. Left knee degenerative varus but without acute effusion. There is moderate edema and soft tissue tenderness distal to the left knee in the left leg calf ankle and foot. No other bony deformities or focal bony tenderness noted. Physical Exam    Patient is alert and oriented. Well-developed, elderly thin and frail. BMI 17..  Pupils equal and reactive. Scleraeanicteric. Neck supple  Lungs clear. Cardiac rate and rhythm regular. Abdomen soft and nontender. Skin warm and dry. XRAY:   X-ray today left foot AP lateral oblique views. Diffuse osteopenia and moderate degenerative changes however no fractures or subluxations are demonstrated. Impression: Left foot x-rays negative for bone or joint trauma. Emergency room x-rays of left hip, pelvis knee and tib-fib reviewed. Cephalo-medullary nail in place with union of comminuted left hip intertrochanteric fracture noted. No new bone injuries noted. Left knee demonstrates severe degenerative change with varus stage IV. Left tib-fib x-rays negative for fracture. ASSESSMENT/PLAN:    Duane Mood was seen today for foot pain. Diagnoses and all orders for this visit:    Left foot pain  -     XR FOOT LEFT (MIN 3 VIEWS); Future    Chronic pain of left knee    Primary osteoarthritis of left knee  -     SC KO ELASTIC WITH JOINTS    Closed displaced subtrochanteric fracture of right femur with routine healing, subsequent encounter    Findings and images reviewed with the patient and her daughter. No skeletal acute trauma noted. Leg swelling likely due to contusion. She is also now on anticoagulants for apparent atrial fibrillation. At their request hinged knee brace fitted.   This may be helpful when walking but should be removed when not walking to minimize exacerbating her left leg edema. I see no orthopedic intervention recommended at this time. If knee pain becomes a primary concern, steroid injection can be considered. She will follow-up with me as needed. Return if symptoms worsen or fail to improve.        Arden Faith MD    9/21/2020  11:09 AM

## 2020-09-21 NOTE — PROGRESS NOTES
A hinged knee brace was applied to Her Left knee(s). Use and care of brace was reviewed with patient. The patient denied any issues with fit or comfort of the braces  Patient instructed to call our office if there are any issues with the braces.     Brace supplied by and billed for by [facility]

## 2021-01-01 ENCOUNTER — HOSPITAL ENCOUNTER (OUTPATIENT)
Dept: ULTRASOUND IMAGING | Age: 86
Discharge: HOME | End: 2021-08-04
Payer: MEDICARE

## 2021-01-01 ENCOUNTER — HOSPITAL ENCOUNTER (INPATIENT)
Age: 86
LOS: 4 days | Discharge: NURSING, REHAB OR LONG-TERM CARE | DRG: 418 | End: 2021-10-19
Attending: SURGERY | Admitting: SURGERY
Payer: MEDICARE

## 2021-01-01 ENCOUNTER — HOSPITAL ENCOUNTER (OUTPATIENT)
Dept: MRI IMAGING | Age: 86
Discharge: HOME | End: 2021-08-04
Payer: MEDICARE

## 2021-01-01 ENCOUNTER — HOSPITAL ENCOUNTER (EMERGENCY)
Age: 86
Discharge: HOME | End: 2021-07-16
Attending: EMERGENCY MEDICINE
Payer: MEDICARE

## 2021-01-01 ENCOUNTER — TELEPHONE (OUTPATIENT)
Dept: NEUROSURGERY | Age: 86
End: 2021-01-01

## 2021-01-01 ENCOUNTER — OFFICE VISIT (OUTPATIENT)
Dept: CARDIOLOGY CLINIC | Age: 86
End: 2021-01-01
Payer: MEDICARE

## 2021-01-01 ENCOUNTER — OFFICE VISIT (OUTPATIENT)
Dept: NEUROSURGERY | Age: 86
End: 2021-01-01
Payer: MEDICARE

## 2021-01-01 ENCOUNTER — TELEPHONE (OUTPATIENT)
Dept: ADMINISTRATIVE | Age: 86
End: 2021-01-01

## 2021-01-01 ENCOUNTER — ANESTHESIA (OUTPATIENT)
Dept: OPERATING ROOM | Age: 86
DRG: 418 | End: 2021-01-01
Payer: MEDICARE

## 2021-01-01 ENCOUNTER — ANESTHESIA EVENT (OUTPATIENT)
Dept: OPERATING ROOM | Age: 86
DRG: 418 | End: 2021-01-01
Payer: MEDICARE

## 2021-01-01 VITALS
WEIGHT: 96 LBS | BODY MASS INDEX: 18.85 KG/M2 | DIASTOLIC BLOOD PRESSURE: 63 MMHG | OXYGEN SATURATION: 97 % | TEMPERATURE: 97.8 F | SYSTOLIC BLOOD PRESSURE: 137 MMHG | HEIGHT: 60 IN | RESPIRATION RATE: 16 BRPM | HEART RATE: 94 BPM

## 2021-01-01 VITALS
TEMPERATURE: 97.8 F | DIASTOLIC BLOOD PRESSURE: 62 MMHG | OXYGEN SATURATION: 96 % | RESPIRATION RATE: 16 BRPM | HEART RATE: 102 BPM | HEIGHT: 64 IN | BODY MASS INDEX: 17.07 KG/M2 | WEIGHT: 100 LBS | SYSTOLIC BLOOD PRESSURE: 111 MMHG

## 2021-01-01 VITALS
RESPIRATION RATE: 14 BRPM | HEART RATE: 64 BPM | OXYGEN SATURATION: 96 % | DIASTOLIC BLOOD PRESSURE: 64 MMHG | TEMPERATURE: 98 F | SYSTOLIC BLOOD PRESSURE: 138 MMHG

## 2021-01-01 VITALS
OXYGEN SATURATION: 97 % | HEIGHT: 60 IN | SYSTOLIC BLOOD PRESSURE: 128 MMHG | WEIGHT: 86.4 LBS | RESPIRATION RATE: 20 BRPM | HEART RATE: 78 BPM | DIASTOLIC BLOOD PRESSURE: 60 MMHG | BODY MASS INDEX: 16.96 KG/M2

## 2021-01-01 VITALS
RESPIRATION RATE: 1 BRPM | SYSTOLIC BLOOD PRESSURE: 166 MMHG | OXYGEN SATURATION: 100 % | TEMPERATURE: 96.3 F | DIASTOLIC BLOOD PRESSURE: 87 MMHG

## 2021-01-01 DIAGNOSIS — I48.91 ATRIAL FIBRILLATION WITH RVR (HCC): Primary | ICD-10-CM

## 2021-01-01 DIAGNOSIS — I10 PRIMARY HYPERTENSION: ICD-10-CM

## 2021-01-01 DIAGNOSIS — R79.1 ELEVATED INTERNATIONAL NORMALIZED RATIO (INR) DUE TO PRIOR ANTICOAGULANT MEDICATION INGESTION: Primary | ICD-10-CM

## 2021-01-01 DIAGNOSIS — S32.010A CLOSED COMPRESSION FRACTURE OF BODY OF L1 VERTEBRA (HCC): Primary | ICD-10-CM

## 2021-01-01 DIAGNOSIS — G89.18 POST-OP PAIN: ICD-10-CM

## 2021-01-01 DIAGNOSIS — R10.9 STOMACH ACHE: ICD-10-CM

## 2021-01-01 DIAGNOSIS — G89.18 POST-OP PAIN: Primary | ICD-10-CM

## 2021-01-01 DIAGNOSIS — I50.32 CHRONIC HEART FAILURE WITH PRESERVED EJECTION FRACTION (HCC): ICD-10-CM

## 2021-01-01 DIAGNOSIS — S81.811A NONINFECTED SKIN TEAR OF LEG, RIGHT, INITIAL ENCOUNTER: ICD-10-CM

## 2021-01-01 LAB
ALBUMIN SERPL-MCNC: 3.4 G/DL (ref 3.5–5.2)
ALBUMIN SERPL-MCNC: 3.5 G/DL (ref 3.5–5.2)
ALBUMIN SERPL-MCNC: 3.7 G/DL (ref 3.5–5.2)
ALP BLD-CCNC: 112 U/L (ref 35–104)
ALP BLD-CCNC: 123 U/L (ref 35–104)
ALP BLD-CCNC: 138 U/L (ref 35–104)
ALT SERPL-CCNC: 17 U/L (ref 0–32)
ALT SERPL-CCNC: <5 U/L (ref 0–32)
ALT SERPL-CCNC: <5 U/L (ref 0–32)
ANION GAP SERPL CALCULATED.3IONS-SCNC: 10 MMOL/L (ref 7–16)
ANION GAP SERPL CALCULATED.3IONS-SCNC: 12 MMOL/L (ref 7–16)
ANION GAP SERPL CALCULATED.3IONS-SCNC: 13 MMOL/L (ref 7–16)
APTT: 66.1 SEC (ref 24.5–35.1)
AST SERPL-CCNC: 25 U/L (ref 0–31)
AST SERPL-CCNC: 34 U/L (ref 0–31)
AST SERPL-CCNC: 45 U/L (ref 0–31)
BASOPHILS ABSOLUTE: 0.01 E9/L (ref 0–0.2)
BASOPHILS ABSOLUTE: 0.02 E9/L (ref 0–0.2)
BASOPHILS RELATIVE PERCENT: 0.1 % (ref 0–2)
BASOPHILS RELATIVE PERCENT: 0.3 % (ref 0–2)
BILIRUB SERPL-MCNC: 0.5 MG/DL (ref 0–1.2)
BILIRUB SERPL-MCNC: 0.7 MG/DL (ref 0–1.2)
BILIRUB SERPL-MCNC: 0.8 MG/DL (ref 0–1.2)
BILIRUBIN DIRECT: 0.2 MG/DL (ref 0–0.3)
BILIRUBIN, INDIRECT: 0.6 MG/DL (ref 0–1)
BUN BLDV-MCNC: 23 MG/DL (ref 6–23)
BUN BLDV-MCNC: 25 MG/DL (ref 6–23)
BUN BLDV-MCNC: 28 MG/DL (ref 6–23)
BUN BLDV-MCNC: 32 MG/DL (ref 6–23)
BUN BLDV-MCNC: 35 MG/DL (ref 6–23)
CALCIUM SERPL-MCNC: 9.1 MG/DL (ref 8.6–10.2)
CALCIUM SERPL-MCNC: 9.2 MG/DL (ref 8.6–10.2)
CALCIUM SERPL-MCNC: 9.3 MG/DL (ref 8.6–10.2)
CALCIUM SERPL-MCNC: 9.3 MG/DL (ref 8.6–10.2)
CALCIUM SERPL-MCNC: 9.8 MG/DL (ref 8.6–10.2)
CHLORIDE BLD-SCNC: 100 MMOL/L (ref 98–107)
CHLORIDE BLD-SCNC: 100 MMOL/L (ref 98–107)
CHLORIDE BLD-SCNC: 101 MMOL/L (ref 98–107)
CHLORIDE BLD-SCNC: 102 MMOL/L (ref 98–107)
CHLORIDE BLD-SCNC: 95 MMOL/L (ref 98–107)
CO2: 23 MMOL/L (ref 22–29)
CO2: 24 MMOL/L (ref 22–29)
CO2: 25 MMOL/L (ref 22–29)
CO2: 25 MMOL/L (ref 22–29)
CO2: 28 MMOL/L (ref 22–29)
CREAT SERPL-MCNC: 1 MG/DL (ref 0.5–1)
CREAT SERPL-MCNC: 1.2 MG/DL (ref 0.5–1)
CREAT SERPL-MCNC: 1.3 MG/DL (ref 0.5–1)
CREAT SERPL-MCNC: 1.5 MG/DL (ref 0.5–1)
CREAT SERPL-MCNC: 1.6 MG/DL (ref 0.5–1)
EKG ATRIAL RATE: 300 BPM
EKG Q-T INTERVAL: 366 MS
EKG QRS DURATION: 84 MS
EKG QTC CALCULATION (BAZETT): 432 MS
EKG R AXIS: -22 DEGREES
EKG T AXIS: 10 DEGREES
EKG VENTRICULAR RATE: 84 BPM
EOSINOPHILS ABSOLUTE: 0 E9/L (ref 0.05–0.5)
EOSINOPHILS ABSOLUTE: 0.07 E9/L (ref 0.05–0.5)
EOSINOPHILS ABSOLUTE: 0.1 E9/L (ref 0.05–0.5)
EOSINOPHILS ABSOLUTE: 0.11 E9/L (ref 0.05–0.5)
EOSINOPHILS RELATIVE PERCENT: 0 % (ref 0–6)
EOSINOPHILS RELATIVE PERCENT: 0.9 % (ref 0–6)
EOSINOPHILS RELATIVE PERCENT: 1.3 % (ref 0–6)
EOSINOPHILS RELATIVE PERCENT: 1.6 % (ref 0–6)
GFR AFRICAN AMERICAN: 36
GFR AFRICAN AMERICAN: 39
GFR AFRICAN AMERICAN: 46
GFR AFRICAN AMERICAN: 51
GFR AFRICAN AMERICAN: >60
GFR NON-AFRICAN AMERICAN: 30 ML/MIN/1.73
GFR NON-AFRICAN AMERICAN: 32 ML/MIN/1.73
GFR NON-AFRICAN AMERICAN: 38 ML/MIN/1.73
GFR NON-AFRICAN AMERICAN: 42 ML/MIN/1.73
GFR NON-AFRICAN AMERICAN: 52 ML/MIN/1.73
GLUCOSE BLD-MCNC: 109 MG/DL (ref 74–99)
GLUCOSE BLD-MCNC: 126 MG/DL (ref 74–99)
GLUCOSE BLD-MCNC: 129 MG/DL (ref 74–99)
GLUCOSE BLD-MCNC: 81 MG/DL (ref 74–99)
GLUCOSE BLD-MCNC: 87 MG/DL (ref 74–99)
HCT VFR BLD CALC: 27.7 % (ref 34–48)
HCT VFR BLD CALC: 29.8 % (ref 34–48)
HCT VFR BLD CALC: 30.1 % (ref 34–48)
HCT VFR BLD CALC: 33 % (ref 34–48)
HCT VFR BLD CALC: 33.7 % (ref 34–48)
HCT VFR BLD CALC: 34 % (ref 34–48)
HEMOGLOBIN: 10.6 G/DL (ref 11.5–15.5)
HEMOGLOBIN: 10.7 G/DL (ref 11.5–15.5)
HEMOGLOBIN: 10.9 G/DL (ref 11.5–15.5)
HEMOGLOBIN: 8.8 G/DL (ref 11.5–15.5)
HEMOGLOBIN: 9.5 G/DL (ref 11.5–15.5)
HEMOGLOBIN: 9.6 G/DL (ref 11.5–15.5)
IMMATURE GRANULOCYTES #: 0.02 E9/L
IMMATURE GRANULOCYTES #: 0.04 E9/L
IMMATURE GRANULOCYTES #: 0.05 E9/L
IMMATURE GRANULOCYTES #: 0.05 E9/L
IMMATURE GRANULOCYTES %: 0.3 % (ref 0–5)
IMMATURE GRANULOCYTES %: 0.4 % (ref 0–5)
IMMATURE GRANULOCYTES %: 0.6 % (ref 0–5)
IMMATURE GRANULOCYTES %: 0.7 % (ref 0–5)
INR BLD: 9.7
LYMPHOCYTES ABSOLUTE: 0.62 E9/L (ref 1.5–4)
LYMPHOCYTES ABSOLUTE: 0.91 E9/L (ref 1.5–4)
LYMPHOCYTES ABSOLUTE: 1.22 E9/L (ref 1.5–4)
LYMPHOCYTES ABSOLUTE: 1.43 E9/L (ref 1.5–4)
LYMPHOCYTES RELATIVE PERCENT: 11.9 % (ref 20–42)
LYMPHOCYTES RELATIVE PERCENT: 17.5 % (ref 20–42)
LYMPHOCYTES RELATIVE PERCENT: 19 % (ref 20–42)
LYMPHOCYTES RELATIVE PERCENT: 5.6 % (ref 20–42)
MAGNESIUM: 2.3 MG/DL (ref 1.6–2.6)
MCH RBC QN AUTO: 32.4 PG (ref 26–35)
MCH RBC QN AUTO: 32.5 PG (ref 26–35)
MCH RBC QN AUTO: 32.6 PG (ref 26–35)
MCH RBC QN AUTO: 32.7 PG (ref 26–35)
MCH RBC QN AUTO: 33.1 PG (ref 26–35)
MCH RBC QN AUTO: 33.1 PG (ref 26–35)
MCHC RBC AUTO-ENTMCNC: 31.6 % (ref 32–34.5)
MCHC RBC AUTO-ENTMCNC: 31.8 % (ref 32–34.5)
MCHC RBC AUTO-ENTMCNC: 31.8 % (ref 32–34.5)
MCHC RBC AUTO-ENTMCNC: 32.1 % (ref 32–34.5)
MCHC RBC AUTO-ENTMCNC: 32.1 % (ref 32–34.5)
MCHC RBC AUTO-ENTMCNC: 32.2 % (ref 32–34.5)
MCV RBC AUTO: 101.2 FL (ref 80–99.9)
MCV RBC AUTO: 102.7 FL (ref 80–99.9)
MCV RBC AUTO: 102.7 FL (ref 80–99.9)
MCV RBC AUTO: 102.8 FL (ref 80–99.9)
MCV RBC AUTO: 103 FL (ref 80–99.9)
MCV RBC AUTO: 103.3 FL (ref 80–99.9)
MONOCYTES ABSOLUTE: 0.49 E9/L (ref 0.1–0.95)
MONOCYTES ABSOLUTE: 0.58 E9/L (ref 0.1–0.95)
MONOCYTES ABSOLUTE: 0.65 E9/L (ref 0.1–0.95)
MONOCYTES ABSOLUTE: 0.75 E9/L (ref 0.1–0.95)
MONOCYTES RELATIVE PERCENT: 6.4 % (ref 2–12)
MONOCYTES RELATIVE PERCENT: 6.7 % (ref 2–12)
MONOCYTES RELATIVE PERCENT: 7.7 % (ref 2–12)
MONOCYTES RELATIVE PERCENT: 9.3 % (ref 2–12)
NEUTROPHILS ABSOLUTE: 4.96 E9/L (ref 1.8–7.3)
NEUTROPHILS ABSOLUTE: 5.4 E9/L (ref 1.8–7.3)
NEUTROPHILS ABSOLUTE: 6.06 E9/L (ref 1.8–7.3)
NEUTROPHILS ABSOLUTE: 9.71 E9/L (ref 1.8–7.3)
NEUTROPHILS RELATIVE PERCENT: 70.9 % (ref 43–80)
NEUTROPHILS RELATIVE PERCENT: 72 % (ref 43–80)
NEUTROPHILS RELATIVE PERCENT: 79.4 % (ref 43–80)
NEUTROPHILS RELATIVE PERCENT: 87.2 % (ref 43–80)
PDW BLD-RTO: 13.6 FL (ref 11.5–15)
PDW BLD-RTO: 13.8 FL (ref 11.5–15)
PDW BLD-RTO: 13.9 FL (ref 11.5–15)
PDW BLD-RTO: 16.7 FL (ref 11.5–15)
PLATELET # BLD: 187 E9/L (ref 130–450)
PLATELET # BLD: 203 E9/L (ref 130–450)
PLATELET # BLD: 230 E9/L (ref 130–450)
PLATELET # BLD: 238 E9/L (ref 130–450)
PLATELET # BLD: 240 E9/L (ref 130–450)
PLATELET # BLD: 265 E9/L (ref 130–450)
PMV BLD AUTO: 10.5 FL (ref 7–12)
PMV BLD AUTO: 10.7 FL (ref 7–12)
PMV BLD AUTO: 10.9 FL (ref 7–12)
PMV BLD AUTO: 11.4 FL (ref 7–12)
POTASSIUM REFLEX MAGNESIUM: 4.6 MMOL/L (ref 3.5–5)
POTASSIUM REFLEX MAGNESIUM: 5.2 MMOL/L (ref 3.5–5)
POTASSIUM REFLEX MAGNESIUM: 5.5 MMOL/L (ref 3.5–5)
POTASSIUM SERPL-SCNC: 4.3 MMOL/L (ref 3.5–5)
POTASSIUM SERPL-SCNC: 4.3 MMOL/L (ref 3.5–5)
PROTHROMBIN TIME: 104.4 SEC (ref 9.3–12.4)
RBC # BLD: 2.69 E12/L (ref 3.5–5.5)
RBC # BLD: 2.9 E12/L (ref 3.5–5.5)
RBC # BLD: 2.93 E12/L (ref 3.5–5.5)
RBC # BLD: 3.26 E12/L (ref 3.5–5.5)
RBC # BLD: 3.28 E12/L (ref 3.5–5.5)
RBC # BLD: 3.29 E12/L (ref 3.5–5.5)
SARS-COV-2, NAAT: NOT DETECTED
SARS-COV-2: NOT DETECTED
SODIUM BLD-SCNC: 133 MMOL/L (ref 132–146)
SODIUM BLD-SCNC: 135 MMOL/L (ref 132–146)
SODIUM BLD-SCNC: 136 MMOL/L (ref 132–146)
SODIUM BLD-SCNC: 137 MMOL/L (ref 132–146)
SODIUM BLD-SCNC: 137 MMOL/L (ref 132–146)
SOURCE: NORMAL
T4 FREE: 1.7 NG/DL (ref 0.93–1.7)
TOTAL PROTEIN: 6.7 G/DL (ref 6.4–8.3)
TOTAL PROTEIN: 7.1 G/DL (ref 6.4–8.3)
TOTAL PROTEIN: 7.8 G/DL (ref 6.4–8.3)
TSH SERPL DL<=0.05 MIU/L-ACNC: 0.85 UIU/ML (ref 0.27–4.2)
WBC # BLD: 11.1 E9/L (ref 4.5–11.5)
WBC # BLD: 7 E9/L (ref 4.5–11.5)
WBC # BLD: 7.5 E9/L (ref 4.5–11.5)
WBC # BLD: 7.6 E9/L (ref 4.5–11.5)
WBC # BLD: 8.2 E9/L (ref 4.5–11.5)
WBC # BLD: 8.4 E9/L (ref 4.5–11.5)

## 2021-01-01 PROCEDURE — 85027 COMPLETE CBC AUTOMATED: CPT

## 2021-01-01 PROCEDURE — 7100000000 HC PACU RECOVERY - FIRST 15 MIN: Performed by: SURGERY

## 2021-01-01 PROCEDURE — 2580000003 HC RX 258: Performed by: STUDENT IN AN ORGANIZED HEALTH CARE EDUCATION/TRAINING PROGRAM

## 2021-01-01 PROCEDURE — 3600000009 HC SURGERY ROBOT BASE: Performed by: SURGERY

## 2021-01-01 PROCEDURE — 6370000000 HC RX 637 (ALT 250 FOR IP): Performed by: SURGERY

## 2021-01-01 PROCEDURE — 80053 COMPREHEN METABOLIC PANEL: CPT

## 2021-01-01 PROCEDURE — 93000 ELECTROCARDIOGRAM COMPLETE: CPT | Performed by: INTERNAL MEDICINE

## 2021-01-01 PROCEDURE — 6360000002 HC RX W HCPCS: Performed by: INTERNAL MEDICINE

## 2021-01-01 PROCEDURE — 99233 SBSQ HOSP IP/OBS HIGH 50: CPT | Performed by: INTERNAL MEDICINE

## 2021-01-01 PROCEDURE — 2500000003 HC RX 250 WO HCPCS

## 2021-01-01 PROCEDURE — 84443 ASSAY THYROID STIM HORMONE: CPT

## 2021-01-01 PROCEDURE — 0FT44ZZ RESECTION OF GALLBLADDER, PERCUTANEOUS ENDOSCOPIC APPROACH: ICD-10-PCS | Performed by: STUDENT IN AN ORGANIZED HEALTH CARE EDUCATION/TRAINING PROGRAM

## 2021-01-01 PROCEDURE — 36415 COLL VENOUS BLD VENIPUNCTURE: CPT

## 2021-01-01 PROCEDURE — 72148 MRI LUMBAR SPINE W/O DYE: CPT

## 2021-01-01 PROCEDURE — 85610 PROTHROMBIN TIME: CPT

## 2021-01-01 PROCEDURE — 6370000000 HC RX 637 (ALT 250 FOR IP): Performed by: INTERNAL MEDICINE

## 2021-01-01 PROCEDURE — 99213 OFFICE O/P EST LOW 20 MIN: CPT | Performed by: PHYSICIAN ASSISTANT

## 2021-01-01 PROCEDURE — 6360000002 HC RX W HCPCS

## 2021-01-01 PROCEDURE — 93005 ELECTROCARDIOGRAM TRACING: CPT | Performed by: SURGERY

## 2021-01-01 PROCEDURE — 80076 HEPATIC FUNCTION PANEL: CPT

## 2021-01-01 PROCEDURE — 2060000000 HC ICU INTERMEDIATE R&B

## 2021-01-01 PROCEDURE — 6370000000 HC RX 637 (ALT 250 FOR IP): Performed by: STUDENT IN AN ORGANIZED HEALTH CARE EDUCATION/TRAINING PROGRAM

## 2021-01-01 PROCEDURE — 99232 SBSQ HOSP IP/OBS MODERATE 35: CPT | Performed by: INTERNAL MEDICINE

## 2021-01-01 PROCEDURE — 88304 TISSUE EXAM BY PATHOLOGIST: CPT

## 2021-01-01 PROCEDURE — 2580000003 HC RX 258

## 2021-01-01 PROCEDURE — 1123F ACP DISCUSS/DSCN MKR DOCD: CPT | Performed by: PHYSICIAN ASSISTANT

## 2021-01-01 PROCEDURE — 83735 ASSAY OF MAGNESIUM: CPT

## 2021-01-01 PROCEDURE — 72146 MRI CHEST SPINE W/O DYE: CPT

## 2021-01-01 PROCEDURE — G8427 DOCREV CUR MEDS BY ELIG CLIN: HCPCS | Performed by: PHYSICIAN ASSISTANT

## 2021-01-01 PROCEDURE — 6360000002 HC RX W HCPCS: Performed by: ANESTHESIOLOGY

## 2021-01-01 PROCEDURE — 85730 THROMBOPLASTIN TIME PARTIAL: CPT

## 2021-01-01 PROCEDURE — 99284 EMERGENCY DEPT VISIT MOD MDM: CPT

## 2021-01-01 PROCEDURE — 99214 OFFICE O/P EST MOD 30 MIN: CPT | Performed by: INTERNAL MEDICINE

## 2021-01-01 PROCEDURE — 85025 COMPLETE CBC W/AUTO DIFF WBC: CPT

## 2021-01-01 PROCEDURE — 6370000000 HC RX 637 (ALT 250 FOR IP): Performed by: NURSE PRACTITIONER

## 2021-01-01 PROCEDURE — 3700000000 HC ANESTHESIA ATTENDED CARE: Performed by: SURGERY

## 2021-01-01 PROCEDURE — 87635 SARS-COV-2 COVID-19 AMP PRB: CPT

## 2021-01-01 PROCEDURE — G8419 CALC BMI OUT NRM PARAM NOF/U: HCPCS | Performed by: PHYSICIAN ASSISTANT

## 2021-01-01 PROCEDURE — 2500000003 HC RX 250 WO HCPCS: Performed by: ANESTHESIOLOGY

## 2021-01-01 PROCEDURE — 6360000002 HC RX W HCPCS: Performed by: SURGERY

## 2021-01-01 PROCEDURE — 84439 ASSAY OF FREE THYROXINE: CPT

## 2021-01-01 PROCEDURE — 99222 1ST HOSP IP/OBS MODERATE 55: CPT | Performed by: INTERNAL MEDICINE

## 2021-01-01 PROCEDURE — 76700 US EXAM ABDOM COMPLETE: CPT

## 2021-01-01 PROCEDURE — 80048 BASIC METABOLIC PNL TOTAL CA: CPT

## 2021-01-01 PROCEDURE — 2500000003 HC RX 250 WO HCPCS: Performed by: SURGERY

## 2021-01-01 PROCEDURE — 97161 PT EVAL LOW COMPLEX 20 MIN: CPT

## 2021-01-01 PROCEDURE — 1036F TOBACCO NON-USER: CPT | Performed by: PHYSICIAN ASSISTANT

## 2021-01-01 PROCEDURE — 1090F PRES/ABSN URINE INCON ASSESS: CPT | Performed by: PHYSICIAN ASSISTANT

## 2021-01-01 PROCEDURE — APPSS60 APP SPLIT SHARED TIME 46-60 MINUTES: Performed by: NURSE PRACTITIONER

## 2021-01-01 PROCEDURE — 97530 THERAPEUTIC ACTIVITIES: CPT

## 2021-01-01 PROCEDURE — S2900 ROBOTIC SURGICAL SYSTEM: HCPCS | Performed by: SURGERY

## 2021-01-01 PROCEDURE — 2700000000 HC OXYGEN THERAPY PER DAY

## 2021-01-01 PROCEDURE — 3700000001 HC ADD 15 MINUTES (ANESTHESIA): Performed by: SURGERY

## 2021-01-01 PROCEDURE — 2709999900 HC NON-CHARGEABLE SUPPLY: Performed by: SURGERY

## 2021-01-01 PROCEDURE — 97165 OT EVAL LOW COMPLEX 30 MIN: CPT

## 2021-01-01 PROCEDURE — 4040F PNEUMOC VAC/ADMIN/RCVD: CPT | Performed by: PHYSICIAN ASSISTANT

## 2021-01-01 PROCEDURE — 3600000019 HC SURGERY ROBOT ADDTL 15MIN: Performed by: SURGERY

## 2021-01-01 PROCEDURE — 8E0W4CZ ROBOTIC ASSISTED PROCEDURE OF TRUNK REGION, PERCUTANEOUS ENDOSCOPIC APPROACH: ICD-10-PCS | Performed by: STUDENT IN AN ORGANIZED HEALTH CARE EDUCATION/TRAINING PROGRAM

## 2021-01-01 PROCEDURE — 7100000001 HC PACU RECOVERY - ADDTL 15 MIN: Performed by: SURGERY

## 2021-01-01 RX ORDER — METOPROLOL TARTRATE 5 MG/5ML
INJECTION INTRAVENOUS PRN
Status: DISCONTINUED | OUTPATIENT
Start: 2021-01-01 | End: 2021-01-01 | Stop reason: SDUPTHER

## 2021-01-01 RX ORDER — FUROSEMIDE 20 MG/1
20 TABLET ORAL 2 TIMES DAILY
Status: DISCONTINUED | OUTPATIENT
Start: 2021-01-01 | End: 2021-01-01

## 2021-01-01 RX ORDER — ONDANSETRON 2 MG/ML
INJECTION INTRAMUSCULAR; INTRAVENOUS PRN
Status: DISCONTINUED | OUTPATIENT
Start: 2021-01-01 | End: 2021-01-01 | Stop reason: SDUPTHER

## 2021-01-01 RX ORDER — FERROUS SULFATE 325(65) MG
325 TABLET ORAL
COMMUNITY
End: 2022-01-01

## 2021-01-01 RX ORDER — SODIUM CHLORIDE 9 MG/ML
INJECTION, SOLUTION INTRAVENOUS CONTINUOUS PRN
Status: DISCONTINUED | OUTPATIENT
Start: 2021-01-01 | End: 2021-01-01 | Stop reason: SDUPTHER

## 2021-01-01 RX ORDER — SPIRONOLACTONE 25 MG/1
25 TABLET ORAL DAILY
Status: DISCONTINUED | OUTPATIENT
Start: 2021-01-01 | End: 2021-01-01

## 2021-01-01 RX ORDER — FERROUS SULFATE 325(65) MG
325 TABLET ORAL
Status: DISCONTINUED | OUTPATIENT
Start: 2021-01-01 | End: 2021-01-01 | Stop reason: HOSPADM

## 2021-01-01 RX ORDER — SODIUM CHLORIDE 9 MG/ML
25 INJECTION, SOLUTION INTRAVENOUS PRN
Status: DISCONTINUED | OUTPATIENT
Start: 2021-01-01 | End: 2021-01-01 | Stop reason: HOSPADM

## 2021-01-01 RX ORDER — PROPOFOL 10 MG/ML
INJECTION, EMULSION INTRAVENOUS PRN
Status: DISCONTINUED | OUTPATIENT
Start: 2021-01-01 | End: 2021-01-01 | Stop reason: SDUPTHER

## 2021-01-01 RX ORDER — DIGOXIN 125 MCG
62.5 TABLET ORAL DAILY
Status: DISCONTINUED | OUTPATIENT
Start: 2021-01-01 | End: 2021-01-01 | Stop reason: HOSPADM

## 2021-01-01 RX ORDER — TRAMADOL HYDROCHLORIDE 50 MG/1
50 TABLET ORAL EVERY 6 HOURS PRN
Status: DISCONTINUED | OUTPATIENT
Start: 2021-01-01 | End: 2021-01-01 | Stop reason: HOSPADM

## 2021-01-01 RX ORDER — FUROSEMIDE 20 MG/1
20 TABLET ORAL DAILY
Status: DISCONTINUED | OUTPATIENT
Start: 2021-01-01 | End: 2021-01-01

## 2021-01-01 RX ORDER — ACETAMINOPHEN 500 MG
1000 TABLET ORAL 2 TIMES DAILY
Status: DISCONTINUED | OUTPATIENT
Start: 2021-01-01 | End: 2021-01-01 | Stop reason: HOSPADM

## 2021-01-01 RX ORDER — ROCURONIUM BROMIDE 10 MG/ML
INJECTION, SOLUTION INTRAVENOUS PRN
Status: DISCONTINUED | OUTPATIENT
Start: 2021-01-01 | End: 2021-01-01 | Stop reason: SDUPTHER

## 2021-01-01 RX ORDER — SODIUM CHLORIDE 0.9 % (FLUSH) 0.9 %
10 SYRINGE (ML) INJECTION PRN
Status: DISCONTINUED | OUTPATIENT
Start: 2021-01-01 | End: 2021-01-01 | Stop reason: HOSPADM

## 2021-01-01 RX ORDER — DIGOXIN 0.25 MG/ML
250 INJECTION INTRAMUSCULAR; INTRAVENOUS ONCE
Qty: 1 ML | Refills: 0 | DISCHARGE
Start: 2021-01-01 | End: 2021-01-01 | Stop reason: HOSPADM

## 2021-01-01 RX ORDER — PHYTONADIONE 5 MG/1
5 TABLET ORAL ONCE
Status: COMPLETED | OUTPATIENT
Start: 2021-01-01 | End: 2021-01-01

## 2021-01-01 RX ORDER — METOPROLOL TARTRATE 50 MG/1
50 TABLET, FILM COATED ORAL 2 TIMES DAILY
Status: DISCONTINUED | OUTPATIENT
Start: 2021-01-01 | End: 2021-01-01 | Stop reason: HOSPADM

## 2021-01-01 RX ORDER — POLYETHYLENE GLYCOL 3350 17 G/17G
17 POWDER, FOR SOLUTION ORAL DAILY
Qty: 527 G | Refills: 1 | DISCHARGE
Start: 2021-01-01 | End: 2021-01-01

## 2021-01-01 RX ORDER — FENTANYL CITRATE 50 UG/ML
INJECTION, SOLUTION INTRAMUSCULAR; INTRAVENOUS PRN
Status: DISCONTINUED | OUTPATIENT
Start: 2021-01-01 | End: 2021-01-01 | Stop reason: SDUPTHER

## 2021-01-01 RX ORDER — INDOCYANINE GREEN AND WATER 25 MG
5 KIT INJECTION
Status: COMPLETED | OUTPATIENT
Start: 2021-01-01 | End: 2021-01-01

## 2021-01-01 RX ORDER — LABETALOL HYDROCHLORIDE 5 MG/ML
5 INJECTION, SOLUTION INTRAVENOUS EVERY 10 MIN PRN
Status: DISCONTINUED | OUTPATIENT
Start: 2021-01-01 | End: 2021-01-01 | Stop reason: HOSPADM

## 2021-01-01 RX ORDER — DIGOXIN 0.06 MG/1
62.5 TABLET ORAL DAILY
Qty: 30 TABLET | Refills: 3 | DISCHARGE
Start: 2021-01-01

## 2021-01-01 RX ORDER — DIGOXIN 0.25 MG/ML
250 INJECTION INTRAMUSCULAR; INTRAVENOUS ONCE
Status: COMPLETED | OUTPATIENT
Start: 2021-01-01 | End: 2021-01-01

## 2021-01-01 RX ORDER — DOCUSATE SODIUM 100 MG/1
100 CAPSULE, LIQUID FILLED ORAL 2 TIMES DAILY PRN
Status: DISCONTINUED | OUTPATIENT
Start: 2021-01-01 | End: 2021-01-01 | Stop reason: HOSPADM

## 2021-01-01 RX ORDER — FUROSEMIDE 20 MG/1
20 TABLET ORAL DAILY PRN
Status: DISCONTINUED | OUTPATIENT
Start: 2021-01-01 | End: 2021-01-01 | Stop reason: HOSPADM

## 2021-01-01 RX ORDER — SODIUM CHLORIDE, SODIUM LACTATE, POTASSIUM CHLORIDE, CALCIUM CHLORIDE 600; 310; 30; 20 MG/100ML; MG/100ML; MG/100ML; MG/100ML
INJECTION, SOLUTION INTRAVENOUS CONTINUOUS
Status: DISCONTINUED | OUTPATIENT
Start: 2021-01-01 | End: 2021-01-01

## 2021-01-01 RX ORDER — LIDOCAINE HYDROCHLORIDE 20 MG/ML
INJECTION, SOLUTION EPIDURAL; INFILTRATION; INTRACAUDAL; PERINEURAL PRN
Status: DISCONTINUED | OUTPATIENT
Start: 2021-01-01 | End: 2021-01-01 | Stop reason: SDUPTHER

## 2021-01-01 RX ORDER — FENTANYL CITRATE 50 UG/ML
50 INJECTION, SOLUTION INTRAMUSCULAR; INTRAVENOUS EVERY 5 MIN PRN
Status: COMPLETED | OUTPATIENT
Start: 2021-01-01 | End: 2021-01-01

## 2021-01-01 RX ORDER — SODIUM CHLORIDE 0.9 % (FLUSH) 0.9 %
5-40 SYRINGE (ML) INJECTION PRN
Status: DISCONTINUED | OUTPATIENT
Start: 2021-01-01 | End: 2021-01-01 | Stop reason: HOSPADM

## 2021-01-01 RX ORDER — SODIUM CHLORIDE 0.9 % (FLUSH) 0.9 %
5-40 SYRINGE (ML) INJECTION EVERY 12 HOURS SCHEDULED
Status: DISCONTINUED | OUTPATIENT
Start: 2021-01-01 | End: 2021-01-01 | Stop reason: HOSPADM

## 2021-01-01 RX ORDER — DEXAMETHASONE SODIUM PHOSPHATE 4 MG/ML
INJECTION, SOLUTION INTRA-ARTICULAR; INTRALESIONAL; INTRAMUSCULAR; INTRAVENOUS; SOFT TISSUE PRN
Status: DISCONTINUED | OUTPATIENT
Start: 2021-01-01 | End: 2021-01-01 | Stop reason: SDUPTHER

## 2021-01-01 RX ORDER — ONDANSETRON 2 MG/ML
4 INJECTION INTRAMUSCULAR; INTRAVENOUS EVERY 6 HOURS PRN
Status: DISCONTINUED | OUTPATIENT
Start: 2021-01-01 | End: 2021-01-01 | Stop reason: HOSPADM

## 2021-01-01 RX ORDER — VITAMIN B COMPLEX
1000 TABLET ORAL EVERY MORNING
Status: DISCONTINUED | OUTPATIENT
Start: 2021-01-01 | End: 2021-01-01 | Stop reason: HOSPADM

## 2021-01-01 RX ORDER — POLYETHYLENE GLYCOL 3350 17 G/17G
17 POWDER, FOR SOLUTION ORAL DAILY
Status: DISCONTINUED | OUTPATIENT
Start: 2021-01-01 | End: 2021-01-01 | Stop reason: HOSPADM

## 2021-01-01 RX ORDER — FENTANYL CITRATE 50 UG/ML
25 INJECTION, SOLUTION INTRAMUSCULAR; INTRAVENOUS EVERY 5 MIN PRN
Status: DISCONTINUED | OUTPATIENT
Start: 2021-01-01 | End: 2021-01-01 | Stop reason: HOSPADM

## 2021-01-01 RX ADMIN — FUROSEMIDE 20 MG: 20 TABLET ORAL at 09:04

## 2021-01-01 RX ADMIN — CARBIDOPA AND LEVODOPA 1 TABLET: 25; 100 TABLET ORAL at 08:05

## 2021-01-01 RX ADMIN — ACETAMINOPHEN 1000 MG: 500 TABLET ORAL at 17:22

## 2021-01-01 RX ADMIN — METOPROLOL TARTRATE 50 MG: 50 TABLET, FILM COATED ORAL at 08:14

## 2021-01-01 RX ADMIN — SPIRONOLACTONE 25 MG: 25 TABLET ORAL at 16:00

## 2021-01-01 RX ADMIN — DEXAMETHASONE SODIUM PHOSPHATE 10 MG: 4 INJECTION, SOLUTION INTRAMUSCULAR; INTRAVENOUS at 11:44

## 2021-01-01 RX ADMIN — FENTANYL CITRATE 50 MCG: 0.05 INJECTION, SOLUTION INTRAMUSCULAR; INTRAVENOUS at 12:50

## 2021-01-01 RX ADMIN — TRAMADOL HYDROCHLORIDE 50 MG: 50 TABLET, FILM COATED ORAL at 20:25

## 2021-01-01 RX ADMIN — ROCURONIUM BROMIDE 25 MG: 10 INJECTION, SOLUTION INTRAVENOUS at 11:41

## 2021-01-01 RX ADMIN — ACETAMINOPHEN 1000 MG: 500 TABLET ORAL at 09:03

## 2021-01-01 RX ADMIN — CARBIDOPA AND LEVODOPA 1 TABLET: 25; 100 TABLET ORAL at 09:04

## 2021-01-01 RX ADMIN — SPIRONOLACTONE 25 MG: 25 TABLET ORAL at 08:05

## 2021-01-01 RX ADMIN — FENTANYL CITRATE 50 MCG: 0.05 INJECTION, SOLUTION INTRAMUSCULAR; INTRAVENOUS at 13:31

## 2021-01-01 RX ADMIN — ACETAMINOPHEN 1000 MG: 500 TABLET ORAL at 08:05

## 2021-01-01 RX ADMIN — PROPOFOL 30 MG: 10 INJECTION, EMULSION INTRAVENOUS at 11:41

## 2021-01-01 RX ADMIN — METOPROLOL TARTRATE 25 MG: 25 TABLET, FILM COATED ORAL at 16:00

## 2021-01-01 RX ADMIN — SODIUM CHLORIDE: 9 INJECTION, SOLUTION INTRAVENOUS at 11:38

## 2021-01-01 RX ADMIN — ROCURONIUM BROMIDE 10 MG: 10 INJECTION, SOLUTION INTRAVENOUS at 11:48

## 2021-01-01 RX ADMIN — SODIUM CHLORIDE, PRESERVATIVE FREE 10 ML: 5 INJECTION INTRAVENOUS at 22:16

## 2021-01-01 RX ADMIN — SODIUM CHLORIDE, PRESERVATIVE FREE 10 ML: 5 INJECTION INTRAVENOUS at 08:01

## 2021-01-01 RX ADMIN — METOPROLOL TARTRATE 25 MG: 25 TABLET, FILM COATED ORAL at 08:00

## 2021-01-01 RX ADMIN — FENTANYL CITRATE 25 MCG: 50 INJECTION, SOLUTION INTRAMUSCULAR; INTRAVENOUS at 12:45

## 2021-01-01 RX ADMIN — METOPROLOL TARTRATE 1 MG: 5 INJECTION, SOLUTION INTRAVENOUS at 12:00

## 2021-01-01 RX ADMIN — LIDOCAINE HYDROCHLORIDE 50 MG: 20 INJECTION, SOLUTION EPIDURAL; INFILTRATION; INTRACAUDAL; PERINEURAL at 11:41

## 2021-01-01 RX ADMIN — SODIUM CHLORIDE, PRESERVATIVE FREE 10 ML: 5 INJECTION INTRAVENOUS at 10:19

## 2021-01-01 RX ADMIN — APIXABAN 2.5 MG: 2.5 TABLET, FILM COATED ORAL at 08:13

## 2021-01-01 RX ADMIN — ACETAMINOPHEN 1000 MG: 500 TABLET ORAL at 17:39

## 2021-01-01 RX ADMIN — SODIUM CHLORIDE, PRESERVATIVE FREE 10 ML: 5 INJECTION INTRAVENOUS at 15:10

## 2021-01-01 RX ADMIN — ACETAMINOPHEN 1000 MG: 500 TABLET ORAL at 16:31

## 2021-01-01 RX ADMIN — DOCUSATE SODIUM 100 MG: 100 CAPSULE ORAL at 08:05

## 2021-01-01 RX ADMIN — FERROUS SULFATE TAB 325 MG (65 MG ELEMENTAL FE) 325 MG: 325 (65 FE) TAB at 08:00

## 2021-01-01 RX ADMIN — METOPROLOL TARTRATE 25 MG: 25 TABLET, FILM COATED ORAL at 16:03

## 2021-01-01 RX ADMIN — CARBIDOPA AND LEVODOPA 1 TABLET: 25; 100 TABLET ORAL at 08:00

## 2021-01-01 RX ADMIN — FENTANYL CITRATE 25 MCG: 50 INJECTION, SOLUTION INTRAMUSCULAR; INTRAVENOUS at 11:40

## 2021-01-01 RX ADMIN — APIXABAN 2.5 MG: 2.5 TABLET, FILM COATED ORAL at 22:16

## 2021-01-01 RX ADMIN — FUROSEMIDE 20 MG: 20 TABLET ORAL at 08:00

## 2021-01-01 RX ADMIN — FERROUS SULFATE TAB 325 MG (65 MG ELEMENTAL FE) 325 MG: 325 (65 FE) TAB at 08:13

## 2021-01-01 RX ADMIN — SODIUM CHLORIDE, PRESERVATIVE FREE 10 ML: 5 INJECTION INTRAVENOUS at 08:14

## 2021-01-01 RX ADMIN — CARBIDOPA AND LEVODOPA 1 TABLET: 25; 100 TABLET ORAL at 20:11

## 2021-01-01 RX ADMIN — CARBIDOPA AND LEVODOPA 1 TABLET: 25; 100 TABLET ORAL at 08:13

## 2021-01-01 RX ADMIN — FERROUS SULFATE TAB 325 MG (65 MG ELEMENTAL FE) 325 MG: 325 (65 FE) TAB at 08:05

## 2021-01-01 RX ADMIN — DOCUSATE SODIUM 100 MG: 100 CAPSULE ORAL at 08:00

## 2021-01-01 RX ADMIN — SPIRONOLACTONE 25 MG: 25 TABLET ORAL at 08:13

## 2021-01-01 RX ADMIN — METOPROLOL TARTRATE 1 MG: 5 INJECTION, SOLUTION INTRAVENOUS at 12:20

## 2021-01-01 RX ADMIN — APIXABAN 2.5 MG: 2.5 TABLET, FILM COATED ORAL at 08:05

## 2021-01-01 RX ADMIN — METOPROLOL TARTRATE 50 MG: 50 TABLET, FILM COATED ORAL at 08:05

## 2021-01-01 RX ADMIN — METOPROLOL TARTRATE 25 MG: 25 TABLET, FILM COATED ORAL at 20:23

## 2021-01-01 RX ADMIN — ONDANSETRON 4 MG: 2 INJECTION INTRAMUSCULAR; INTRAVENOUS at 12:30

## 2021-01-01 RX ADMIN — METOPROLOL TARTRATE 25 MG: 25 TABLET, FILM COATED ORAL at 09:04

## 2021-01-01 RX ADMIN — CARBIDOPA AND LEVODOPA 1 TABLET: 25; 100 TABLET ORAL at 22:16

## 2021-01-01 RX ADMIN — Medication 1000 UNITS: at 08:05

## 2021-01-01 RX ADMIN — FUROSEMIDE 20 MG: 20 TABLET ORAL at 16:00

## 2021-01-01 RX ADMIN — METOPROLOL TARTRATE 25 MG: 25 TABLET, FILM COATED ORAL at 08:19

## 2021-01-01 RX ADMIN — SUGAMMADEX 200 MG: 100 INJECTION, SOLUTION INTRAVENOUS at 12:41

## 2021-01-01 RX ADMIN — POLYETHYLENE GLYCOL 3350 17 G: 17 POWDER, FOR SOLUTION ORAL at 15:18

## 2021-01-01 RX ADMIN — SODIUM CHLORIDE, PRESERVATIVE FREE 10 ML: 5 INJECTION INTRAVENOUS at 20:23

## 2021-01-01 RX ADMIN — METOPROLOL TARTRATE 50 MG: 50 TABLET, FILM COATED ORAL at 22:17

## 2021-01-01 RX ADMIN — SODIUM CHLORIDE, POTASSIUM CHLORIDE, SODIUM LACTATE AND CALCIUM CHLORIDE: 600; 310; 30; 20 INJECTION, SOLUTION INTRAVENOUS at 18:39

## 2021-01-01 RX ADMIN — DIGOXIN 250 MCG: 0.25 INJECTION INTRAMUSCULAR; INTRAVENOUS at 10:18

## 2021-01-01 RX ADMIN — PROPOFOL 20 MG: 10 INJECTION, EMULSION INTRAVENOUS at 11:39

## 2021-01-01 RX ADMIN — APIXABAN 2.5 MG: 2.5 TABLET, FILM COATED ORAL at 20:11

## 2021-01-01 RX ADMIN — ACETAMINOPHEN 1000 MG: 500 TABLET ORAL at 08:14

## 2021-01-01 RX ADMIN — FENTANYL CITRATE 50 MCG: 0.05 INJECTION, SOLUTION INTRAMUSCULAR; INTRAVENOUS at 13:55

## 2021-01-01 RX ADMIN — ACETAMINOPHEN 1000 MG: 500 TABLET ORAL at 08:00

## 2021-01-01 RX ADMIN — Medication 1000 UNITS: at 15:59

## 2021-01-01 RX ADMIN — Medication 2000 MG: at 11:38

## 2021-01-01 RX ADMIN — CARBIDOPA AND LEVODOPA 1 TABLET: 25; 100 TABLET ORAL at 20:23

## 2021-01-01 RX ADMIN — FENTANYL CITRATE 25 MCG: 50 INJECTION, SOLUTION INTRAMUSCULAR; INTRAVENOUS at 11:41

## 2021-01-01 RX ADMIN — LABETALOL HYDROCHLORIDE 5 MG: 5 INJECTION INTRAVENOUS at 13:20

## 2021-01-01 RX ADMIN — FENTANYL CITRATE 50 MCG: 0.05 INJECTION, SOLUTION INTRAMUSCULAR; INTRAVENOUS at 13:12

## 2021-01-01 RX ADMIN — APIXABAN 2.5 MG: 2.5 TABLET, FILM COATED ORAL at 09:55

## 2021-01-01 RX ADMIN — DIGOXIN 250 MCG: 0.25 INJECTION INTRAMUSCULAR; INTRAVENOUS at 15:10

## 2021-01-01 RX ADMIN — METOPROLOL TARTRATE 1 MG: 5 INJECTION, SOLUTION INTRAVENOUS at 12:42

## 2021-01-01 RX ADMIN — PHYTONADIONE 5 MG: 5 TABLET ORAL at 14:07

## 2021-01-01 RX ADMIN — INDOCYANINE GREEN AND WATER 5 MG: KIT at 11:00

## 2021-01-01 RX ADMIN — DOCUSATE SODIUM 100 MG: 100 CAPSULE ORAL at 08:19

## 2021-01-01 RX ADMIN — SODIUM CHLORIDE, PRESERVATIVE FREE 10 ML: 5 INJECTION INTRAVENOUS at 08:06

## 2021-01-01 RX ADMIN — ACETAMINOPHEN 1000 MG: 500 TABLET ORAL at 16:00

## 2021-01-01 RX ADMIN — SODIUM CHLORIDE, PRESERVATIVE FREE 10 ML: 5 INJECTION INTRAVENOUS at 20:09

## 2021-01-01 RX ADMIN — Medication 1000 UNITS: at 09:04

## 2021-01-01 RX ADMIN — METOPROLOL TARTRATE 50 MG: 50 TABLET, FILM COATED ORAL at 20:11

## 2021-01-01 RX ADMIN — Medication 1000 UNITS: at 08:13

## 2021-01-01 RX ADMIN — SPIRONOLACTONE 25 MG: 25 TABLET ORAL at 09:04

## 2021-01-01 RX ADMIN — Medication 1000 UNITS: at 08:00

## 2021-01-01 ASSESSMENT — PULMONARY FUNCTION TESTS
PIF_VALUE: 13
PIF_VALUE: 1
PIF_VALUE: 13
PIF_VALUE: 16
PIF_VALUE: 2
PIF_VALUE: 12
PIF_VALUE: 17
PIF_VALUE: 15
PIF_VALUE: 2
PIF_VALUE: 13
PIF_VALUE: 18
PIF_VALUE: 16
PIF_VALUE: 16
PIF_VALUE: 17
PIF_VALUE: 1
PIF_VALUE: 17
PIF_VALUE: 22
PIF_VALUE: 15
PIF_VALUE: 0
PIF_VALUE: 13
PIF_VALUE: 0
PIF_VALUE: 0
PIF_VALUE: 12
PIF_VALUE: 2
PIF_VALUE: 13
PIF_VALUE: 12
PIF_VALUE: 1
PIF_VALUE: 12
PIF_VALUE: 16
PIF_VALUE: 2
PIF_VALUE: 13
PIF_VALUE: 16
PIF_VALUE: 11
PIF_VALUE: 16
PIF_VALUE: 16
PIF_VALUE: 1
PIF_VALUE: 17
PIF_VALUE: 16
PIF_VALUE: 17
PIF_VALUE: 15
PIF_VALUE: 16
PIF_VALUE: 8
PIF_VALUE: 1
PIF_VALUE: 15
PIF_VALUE: 16
PIF_VALUE: 13
PIF_VALUE: 16
PIF_VALUE: 11
PIF_VALUE: 2
PIF_VALUE: 19
PIF_VALUE: 16
PIF_VALUE: 16
PIF_VALUE: 19
PIF_VALUE: 6
PIF_VALUE: 1
PIF_VALUE: 1
PIF_VALUE: 17
PIF_VALUE: 7
PIF_VALUE: 1
PIF_VALUE: 2
PIF_VALUE: 16
PIF_VALUE: 16
PIF_VALUE: 2
PIF_VALUE: 17
PIF_VALUE: 15
PIF_VALUE: 1
PIF_VALUE: 15
PIF_VALUE: 17
PIF_VALUE: 2
PIF_VALUE: 16
PIF_VALUE: 0

## 2021-01-01 ASSESSMENT — PAIN SCALES - GENERAL
PAINLEVEL_OUTOF10: 0
PAINLEVEL_OUTOF10: 8
PAINLEVEL_OUTOF10: 10
PAINLEVEL_OUTOF10: 3
PAINLEVEL_OUTOF10: 5
PAINLEVEL_OUTOF10: 2
PAINLEVEL_OUTOF10: 5
PAINLEVEL_OUTOF10: 0
PAINLEVEL_OUTOF10: 8
PAINLEVEL_OUTOF10: 0
PAINLEVEL_OUTOF10: 5
PAINLEVEL_OUTOF10: 0
PAINLEVEL_OUTOF10: 5
PAINLEVEL_OUTOF10: 7
PAINLEVEL_OUTOF10: 5
PAINLEVEL_OUTOF10: 0

## 2021-01-01 ASSESSMENT — ENCOUNTER SYMPTOMS
NAUSEA: 0
EYE PAIN: 0
ABDOMINAL DISTENTION: 0
SHORTNESS OF BREATH: 0
WHEEZING: 0
EYE REDNESS: 0
VOMITING: 0
SORE THROAT: 0
COUGH: 0
DIARRHEA: 0
SINUS PRESSURE: 0
BACK PAIN: 0
EYE DISCHARGE: 0

## 2021-01-01 ASSESSMENT — PAIN DESCRIPTION - PAIN TYPE
TYPE: SURGICAL PAIN

## 2021-01-01 ASSESSMENT — PAIN DESCRIPTION - FREQUENCY: FREQUENCY: INTERMITTENT

## 2021-01-01 ASSESSMENT — PAIN DESCRIPTION - LOCATION
LOCATION: ABDOMEN
LOCATION: BACK
LOCATION: ABDOMEN
LOCATION: ABDOMEN

## 2021-01-01 ASSESSMENT — PAIN DESCRIPTION - DESCRIPTORS: DESCRIPTORS: ACHING;DISCOMFORT;SORE

## 2021-01-01 ASSESSMENT — PAIN DESCRIPTION - ONSET: ONSET: GRADUAL

## 2021-01-01 ASSESSMENT — PAIN DESCRIPTION - ORIENTATION
ORIENTATION: LOWER;MID
ORIENTATION: LOWER

## 2021-01-01 ASSESSMENT — PAIN DESCRIPTION - PROGRESSION
CLINICAL_PROGRESSION: NOT CHANGED
CLINICAL_PROGRESSION: NOT CHANGED

## 2021-01-01 ASSESSMENT — PAIN - FUNCTIONAL ASSESSMENT: PAIN_FUNCTIONAL_ASSESSMENT: PREVENTS OR INTERFERES SOME ACTIVE ACTIVITIES AND ADLS

## 2021-06-01 ENCOUNTER — TELEPHONE (OUTPATIENT)
Dept: NEUROSURGERY | Age: 86
End: 2021-06-01

## 2021-06-03 ENCOUNTER — HOSPITAL ENCOUNTER (OUTPATIENT)
Age: 86
Setting detail: OBSERVATION
Discharge: NURSING, REHAB OR LONG-TERM CARE | End: 2021-06-04
Attending: EMERGENCY MEDICINE | Admitting: INTERNAL MEDICINE
Payer: MEDICARE

## 2021-06-03 ENCOUNTER — APPOINTMENT (OUTPATIENT)
Dept: CT IMAGING | Age: 86
End: 2021-06-03
Payer: MEDICARE

## 2021-06-03 DIAGNOSIS — M54.50 LOW BACK PAIN, UNSPECIFIED BACK PAIN LATERALITY, UNSPECIFIED CHRONICITY, UNSPECIFIED WHETHER SCIATICA PRESENT: Primary | ICD-10-CM

## 2021-06-03 PROBLEM — M54.9 BACK PAIN: Status: ACTIVE | Noted: 2021-06-03

## 2021-06-03 LAB
ALBUMIN SERPL-MCNC: 3.8 G/DL (ref 3.5–5.2)
ALP BLD-CCNC: 96 U/L (ref 35–104)
ALT SERPL-CCNC: 6 U/L (ref 0–32)
ANION GAP SERPL CALCULATED.3IONS-SCNC: 9 MMOL/L (ref 7–16)
AST SERPL-CCNC: 18 U/L (ref 0–31)
BASOPHILS ABSOLUTE: 0.01 E9/L (ref 0–0.2)
BASOPHILS RELATIVE PERCENT: 0.1 % (ref 0–2)
BILIRUB SERPL-MCNC: 0.9 MG/DL (ref 0–1.2)
BUN BLDV-MCNC: 27 MG/DL (ref 6–23)
CALCIUM SERPL-MCNC: 9.7 MG/DL (ref 8.6–10.2)
CHLORIDE BLD-SCNC: 100 MMOL/L (ref 98–107)
CO2: 31 MMOL/L (ref 22–29)
CREAT SERPL-MCNC: 1.2 MG/DL (ref 0.5–1)
EOSINOPHILS ABSOLUTE: 0.09 E9/L (ref 0.05–0.5)
EOSINOPHILS RELATIVE PERCENT: 1 % (ref 0–6)
GFR AFRICAN AMERICAN: 51
GFR NON-AFRICAN AMERICAN: 42 ML/MIN/1.73
GLUCOSE BLD-MCNC: 93 MG/DL (ref 74–99)
HCT VFR BLD CALC: 39.6 % (ref 34–48)
HEMOGLOBIN: 12.5 G/DL (ref 11.5–15.5)
IMMATURE GRANULOCYTES #: 0.07 E9/L
IMMATURE GRANULOCYTES %: 0.8 % (ref 0–5)
LYMPHOCYTES ABSOLUTE: 0.96 E9/L (ref 1.5–4)
LYMPHOCYTES RELATIVE PERCENT: 11 % (ref 20–42)
MCH RBC QN AUTO: 31.3 PG (ref 26–35)
MCHC RBC AUTO-ENTMCNC: 31.6 % (ref 32–34.5)
MCV RBC AUTO: 99 FL (ref 80–99.9)
MONOCYTES ABSOLUTE: 0.76 E9/L (ref 0.1–0.95)
MONOCYTES RELATIVE PERCENT: 8.7 % (ref 2–12)
NEUTROPHILS ABSOLUTE: 6.82 E9/L (ref 1.8–7.3)
NEUTROPHILS RELATIVE PERCENT: 78.4 % (ref 43–80)
PDW BLD-RTO: 14.4 FL (ref 11.5–15)
PLATELET # BLD: 178 E9/L (ref 130–450)
PMV BLD AUTO: 11.2 FL (ref 7–12)
POTASSIUM SERPL-SCNC: 4.7 MMOL/L (ref 3.5–5)
RBC # BLD: 4 E12/L (ref 3.5–5.5)
SODIUM BLD-SCNC: 140 MMOL/L (ref 132–146)
TOTAL PROTEIN: 7.6 G/DL (ref 6.4–8.3)
WBC # BLD: 8.7 E9/L (ref 4.5–11.5)

## 2021-06-03 PROCEDURE — 6370000000 HC RX 637 (ALT 250 FOR IP): Performed by: FAMILY MEDICINE

## 2021-06-03 PROCEDURE — G0378 HOSPITAL OBSERVATION PER HR: HCPCS

## 2021-06-03 PROCEDURE — 96372 THER/PROPH/DIAG INJ SC/IM: CPT

## 2021-06-03 PROCEDURE — 80053 COMPREHEN METABOLIC PANEL: CPT

## 2021-06-03 PROCEDURE — 2580000003 HC RX 258: Performed by: FAMILY MEDICINE

## 2021-06-03 PROCEDURE — 6360000002 HC RX W HCPCS: Performed by: EMERGENCY MEDICINE

## 2021-06-03 PROCEDURE — 72131 CT LUMBAR SPINE W/O DYE: CPT

## 2021-06-03 PROCEDURE — 85025 COMPLETE CBC W/AUTO DIFF WBC: CPT

## 2021-06-03 PROCEDURE — 99283 EMERGENCY DEPT VISIT LOW MDM: CPT

## 2021-06-03 RX ORDER — SODIUM CHLORIDE 9 MG/ML
25 INJECTION, SOLUTION INTRAVENOUS PRN
Status: DISCONTINUED | OUTPATIENT
Start: 2021-06-03 | End: 2021-06-04 | Stop reason: HOSPADM

## 2021-06-03 RX ORDER — SODIUM CHLORIDE 0.9 % (FLUSH) 0.9 %
5-40 SYRINGE (ML) INJECTION EVERY 12 HOURS SCHEDULED
Status: DISCONTINUED | OUTPATIENT
Start: 2021-06-03 | End: 2021-06-04 | Stop reason: HOSPADM

## 2021-06-03 RX ORDER — POLYETHYLENE GLYCOL 3350 17 G/17G
17 POWDER, FOR SOLUTION ORAL DAILY PRN
Status: DISCONTINUED | OUTPATIENT
Start: 2021-06-03 | End: 2021-06-04 | Stop reason: HOSPADM

## 2021-06-03 RX ORDER — ACETAMINOPHEN 325 MG/1
650 TABLET ORAL EVERY 6 HOURS PRN
Status: DISCONTINUED | OUTPATIENT
Start: 2021-06-03 | End: 2021-06-04 | Stop reason: HOSPADM

## 2021-06-03 RX ORDER — KETOROLAC TROMETHAMINE 30 MG/ML
30 INJECTION, SOLUTION INTRAMUSCULAR; INTRAVENOUS ONCE
Status: COMPLETED | OUTPATIENT
Start: 2021-06-03 | End: 2021-06-03

## 2021-06-03 RX ORDER — CYCLOBENZAPRINE HCL 10 MG
10 TABLET ORAL 3 TIMES DAILY PRN
Status: DISCONTINUED | OUTPATIENT
Start: 2021-06-03 | End: 2021-06-04 | Stop reason: HOSPADM

## 2021-06-03 RX ORDER — ONDANSETRON 4 MG/1
4 TABLET, ORALLY DISINTEGRATING ORAL EVERY 8 HOURS PRN
Status: DISCONTINUED | OUTPATIENT
Start: 2021-06-03 | End: 2021-06-04 | Stop reason: HOSPADM

## 2021-06-03 RX ORDER — DOCUSATE SODIUM 100 MG/1
100 CAPSULE, LIQUID FILLED ORAL 2 TIMES DAILY PRN
Status: DISCONTINUED | OUTPATIENT
Start: 2021-06-03 | End: 2021-06-04 | Stop reason: HOSPADM

## 2021-06-03 RX ORDER — ACETAMINOPHEN 500 MG
1000 TABLET ORAL 2 TIMES DAILY
Status: DISCONTINUED | OUTPATIENT
Start: 2021-06-03 | End: 2021-06-03 | Stop reason: SDUPTHER

## 2021-06-03 RX ORDER — VITAMIN B COMPLEX
1000 TABLET ORAL EVERY MORNING
Status: DISCONTINUED | OUTPATIENT
Start: 2021-06-04 | End: 2021-06-04 | Stop reason: HOSPADM

## 2021-06-03 RX ORDER — ONDANSETRON 2 MG/ML
4 INJECTION INTRAMUSCULAR; INTRAVENOUS EVERY 6 HOURS PRN
Status: DISCONTINUED | OUTPATIENT
Start: 2021-06-03 | End: 2021-06-04 | Stop reason: HOSPADM

## 2021-06-03 RX ORDER — SODIUM CHLORIDE 0.9 % (FLUSH) 0.9 %
10 SYRINGE (ML) INJECTION PRN
Status: DISCONTINUED | OUTPATIENT
Start: 2021-06-03 | End: 2021-06-04 | Stop reason: HOSPADM

## 2021-06-03 RX ORDER — ACETAMINOPHEN 650 MG/1
650 SUPPOSITORY RECTAL EVERY 6 HOURS PRN
Status: DISCONTINUED | OUTPATIENT
Start: 2021-06-03 | End: 2021-06-04 | Stop reason: HOSPADM

## 2021-06-03 RX ORDER — POLYVINYL ALCOHOL 14 MG/ML
1 SOLUTION/ DROPS OPHTHALMIC 3 TIMES DAILY
Status: DISCONTINUED | OUTPATIENT
Start: 2021-06-03 | End: 2021-06-04 | Stop reason: HOSPADM

## 2021-06-03 RX ORDER — SPIRONOLACTONE 25 MG/1
25 TABLET ORAL DAILY
COMMUNITY
End: 2021-01-01

## 2021-06-03 RX ADMIN — KETOROLAC TROMETHAMINE 30 MG: 30 INJECTION, SOLUTION INTRAMUSCULAR; INTRAVENOUS at 12:56

## 2021-06-03 RX ADMIN — CARBIDOPA AND LEVODOPA 1 TABLET: 25; 100 TABLET ORAL at 21:17

## 2021-06-03 RX ADMIN — SODIUM CHLORIDE, PRESERVATIVE FREE 10 ML: 5 INJECTION INTRAVENOUS at 21:18

## 2021-06-03 RX ADMIN — METOPROLOL TARTRATE 25 MG: 25 TABLET, FILM COATED ORAL at 20:37

## 2021-06-03 RX ADMIN — POLYVINYL ALCOHOL 1 DROP: 14 SOLUTION/ DROPS OPHTHALMIC at 21:18

## 2021-06-03 ASSESSMENT — PAIN SCALES - GENERAL: PAINLEVEL_OUTOF10: 8

## 2021-06-03 NOTE — ED NOTES
Pt placed in hospital gown, belongings placed in a bag and left at bedside with patient. Pt also placed on bedpan.      Pat Hernandez RN  06/03/21 [place], RN  06/03/21 [id number]

## 2021-06-03 NOTE — ED NOTES
Patient assisted on the bed pan. Patient able to roll for bed pan placement .       Warren Pratt RN  06/03/21 9085

## 2021-06-03 NOTE — ED PROVIDER NOTES
Department of Emergency Medicine   ED  Provider Note  Admit Date/RoomTime: 6/3/2021 12:03 PM  ED Room: [facility]          History of Present Illness:  6/3/21, Time: 4:35 PM EDT  Chief Complaint   Patient presents with    Back Pain     middle back pain after bending down and messing with shoes, able to ambulate with assistance. Roselyn Higuera is a 80 y.o. female presenting to the ED for back pain. Patient states she bent down to pick something up and felt a sharp back pain, lower, came on suddenly, worse with movement, improves with rest, does not rate anywhere. She states that she is able to move, but is a severe pain. She does live alone. Denies any change in bowel or bladder. Denies any paresthesias in the lower extremities. Denies any nausea, vomiting, cough, sputum, chest pain, vomiting, paresthesias, lethargy, or any other symptoms or complaints. Review of Systems:   Pertinent positives and negatives are stated within HPI, all other systems reviewed and are negative.        --------------------------------------------- PAST HISTORY ---------------------------------------------  Past Medical History:  has a past medical history of Decreased ambulation status, Gastric reflux, Hypertension, Left cataract, Parkinson disease ([address] and Vitamin D deficiency. Past Surgical History:  has a past surgical history that includes Hip fracture surgery (Right, 11/16/2015); Hysterectomy (2011?); Tonsillectomy; Cataract removal with implant (Left, 12/06/2016); Total knee arthroplasty (Right, 01/16/1997); Kyphosis surgery (01/11/2017); Kyphosis surgery; Kyphosis surgery (N/A, 6/10/2019); hernia repair (Left, 8/5/2019); LAPAROTOMY EXPLORATORY (N/A, 8/5/2019); and Kyphosis surgery (N/A, 8/8/2019). Social History:  reports that she has never smoked. She has never used smokeless tobacco. She reports that she does not drink alcohol and does not use drugs.     Family History: family history is not on Spinal canal stenosis of moderate-to-severe degree at L4-5 and of   moderate degree at L3-4. The               EKG Interpretation  Interpreted by emergency department physician, Dr. Daenna Lantigua         ------------------------- NURSING NOTES AND VITALS REVIEWED ---------------------------   The nursing notes within the ED encounter and vital signs as below have been reviewed by myself  BP (!) 141/64   Pulse 75   Temp 98.2 °F (36.8 °C)   Resp 20   Ht 5' 4\" (1.626 m)   Wt 100 lb (45.4 kg)   SpO2 96%   BMI 17.16 kg/m²     Oxygen Saturation Interpretation: Normal    The patients available past medical records and past encounters were reviewed. ------------------------------ ED COURSE/MEDICAL DECISION MAKING----------------------  Medications   ketorolac (TORADOL) injection 30 mg (30 mg Intramuscular Given 6/3/21 1256)             Medical Decision Making:    Labs and imaging reviewed. Reevaluation, patient's resting. No red flag signs concerning for cauda equina at this time. However, patient is not able to ambulate due to pain. She is requesting she be placed. Social work consulted, patient will be admitted. Counseling: The emergency provider has spoken with the patient and discussed todays results, in addition to providing specific details for the plan of care and counseling regarding the diagnosis and prognosis. Questions are answered at this time and they are agreeable with the plan.       --------------------------------- IMPRESSION AND DISPOSITION ---------------------------------    IMPRESSION  1. Low back pain, unspecified back pain laterality, unspecified chronicity, unspecified whether sciatica present        DISPOSITION  Disposition: Admit to med/surg floor  Patient condition is stable        NOTE: This report was transcribed using voice recognition software.  Every effort was made to ensure accuracy; however, inadvertent computerized transcription errors may be present        Maria Teresa Alvarez Daniel Aguayo MD  06/03/21 9612

## 2021-06-03 NOTE — ED NOTES
Bed: 39  Expected date:   Expected time:   Means of arrival:   Comments:  EMS     Fredonia Curling, RN  06/03/21 9084

## 2021-06-03 NOTE — CARE COORDINATION
Social Work/ Discharge Planning:    Pt presents to the ED secondary to back pain from home. SW spoke with Pt and Pts son Irma Lindquist [phone]) bedside. Pt was alert and oriented x3. Pt reports she lives alone and has been having trouble for \"quite a while\" getting around. Pt reports she has a walker and rollator and can't walk more than a couple steps without using one of them. Pt reports she needs assistance dressing, showering and cleaning. Pts daughter (Peg [phone]) comes in from [place] to help with cleaning and meal prepping for the Pt. Pts PCP is Dr. Pasquale Lawrence and pharmacy is [facility] in [address] [facility], [address]. Pt has hx with [facility] and would like to go there upon discharge. SW made referral to [facility]. PT/OT eval orders placed. SW/MARGOT to follow.        Aliyah Lindsey, ALFAW Student  Reviewed by PREETI Wu

## 2021-06-04 VITALS
HEIGHT: 64 IN | HEART RATE: 64 BPM | OXYGEN SATURATION: 92 % | BODY MASS INDEX: 17.09 KG/M2 | RESPIRATION RATE: 16 BRPM | WEIGHT: 100.09 LBS | TEMPERATURE: 98 F | SYSTOLIC BLOOD PRESSURE: 172 MMHG | DIASTOLIC BLOOD PRESSURE: 77 MMHG

## 2021-06-04 LAB
ANION GAP SERPL CALCULATED.3IONS-SCNC: 8 MMOL/L (ref 7–16)
BUN BLDV-MCNC: 27 MG/DL (ref 6–23)
CALCIUM SERPL-MCNC: 9.1 MG/DL (ref 8.6–10.2)
CHLORIDE BLD-SCNC: 104 MMOL/L (ref 98–107)
CO2: 28 MMOL/L (ref 22–29)
CREAT SERPL-MCNC: 1.2 MG/DL (ref 0.5–1)
GFR AFRICAN AMERICAN: 51
GFR NON-AFRICAN AMERICAN: 42 ML/MIN/1.73
GLUCOSE BLD-MCNC: 85 MG/DL (ref 74–99)
HCT VFR BLD CALC: 33.1 % (ref 34–48)
HEMOGLOBIN: 10.7 G/DL (ref 11.5–15.5)
MCH RBC QN AUTO: 31.8 PG (ref 26–35)
MCHC RBC AUTO-ENTMCNC: 32.3 % (ref 32–34.5)
MCV RBC AUTO: 98.5 FL (ref 80–99.9)
PDW BLD-RTO: 14.3 FL (ref 11.5–15)
PLATELET # BLD: 163 E9/L (ref 130–450)
PMV BLD AUTO: 11.2 FL (ref 7–12)
POTASSIUM REFLEX MAGNESIUM: 4.5 MMOL/L (ref 3.5–5)
RBC # BLD: 3.36 E12/L (ref 3.5–5.5)
SARS-COV-2, NAAT: NOT DETECTED
SODIUM BLD-SCNC: 140 MMOL/L (ref 132–146)
WBC # BLD: 5.6 E9/L (ref 4.5–11.5)

## 2021-06-04 PROCEDURE — 6370000000 HC RX 637 (ALT 250 FOR IP): Performed by: FAMILY MEDICINE

## 2021-06-04 PROCEDURE — 2580000003 HC RX 258: Performed by: FAMILY MEDICINE

## 2021-06-04 PROCEDURE — 87635 SARS-COV-2 COVID-19 AMP PRB: CPT

## 2021-06-04 PROCEDURE — 80048 BASIC METABOLIC PNL TOTAL CA: CPT

## 2021-06-04 PROCEDURE — 97530 THERAPEUTIC ACTIVITIES: CPT

## 2021-06-04 PROCEDURE — G0378 HOSPITAL OBSERVATION PER HR: HCPCS

## 2021-06-04 PROCEDURE — 97535 SELF CARE MNGMENT TRAINING: CPT

## 2021-06-04 PROCEDURE — 36415 COLL VENOUS BLD VENIPUNCTURE: CPT

## 2021-06-04 PROCEDURE — 97166 OT EVAL MOD COMPLEX 45 MIN: CPT

## 2021-06-04 PROCEDURE — 85027 COMPLETE CBC AUTOMATED: CPT

## 2021-06-04 PROCEDURE — 97161 PT EVAL LOW COMPLEX 20 MIN: CPT

## 2021-06-04 RX ORDER — PSEUDOEPHEDRINE HCL 30 MG
100 TABLET ORAL 2 TIMES DAILY PRN
Qty: 60 CAPSULE | Refills: 1 | Status: SHIPPED | OUTPATIENT
Start: 2021-06-04 | End: 2022-01-01

## 2021-06-04 RX ADMIN — METOPROLOL TARTRATE 25 MG: 25 TABLET, FILM COATED ORAL at 08:46

## 2021-06-04 RX ADMIN — SODIUM CHLORIDE, PRESERVATIVE FREE 10 ML: 5 INJECTION INTRAVENOUS at 08:46

## 2021-06-04 RX ADMIN — Medication 1000 UNITS: at 08:46

## 2021-06-04 RX ADMIN — POLYVINYL ALCOHOL 1 DROP: 14 SOLUTION/ DROPS OPHTHALMIC at 08:46

## 2021-06-04 RX ADMIN — CARBIDOPA AND LEVODOPA 1 TABLET: 25; 100 TABLET ORAL at 08:46

## 2021-06-04 ASSESSMENT — PAIN DESCRIPTION - LOCATION: LOCATION: BACK

## 2021-06-04 ASSESSMENT — PAIN SCALES - GENERAL
PAINLEVEL_OUTOF10: 8
PAINLEVEL_OUTOF10: 2

## 2021-06-04 ASSESSMENT — PAIN DESCRIPTION - FREQUENCY: FREQUENCY: CONTINUOUS

## 2021-06-04 ASSESSMENT — PAIN DESCRIPTION - DESCRIPTORS: DESCRIPTORS: DISCOMFORT

## 2021-06-04 ASSESSMENT — PAIN DESCRIPTION - ONSET: ONSET: ON-GOING

## 2021-06-04 ASSESSMENT — PAIN DESCRIPTION - PAIN TYPE: TYPE: CHRONIC PAIN

## 2021-06-04 NOTE — PROGRESS NOTES
Physical Therapy  Physical Therapy Initial Assessment       Name: Amadou Sadler  : 1928  MRN: [id number]      Date of Service: 2021    Evaluating PT:  Deric Vergara PT, DPT  [id number]    Room #:  1276/4757-U  Diagnosis:  Back pain [M54.9]    PMHx/PSHx:  Parkinson's Disease, HTN    Procedure/Surgery:    Precautions:  Falls, PD  Equipment Needs:  TBD    SUBJECTIVE:    Pt lives with son in a [facility] home. Pt has first floor set up with 4 stairs to enter (B rails). Pt ambulated with [employer] or Rollator independently PTA. Equipment Owned: [employer] and Rollator     OBJECTIVE:   Initial Evaluation  Date: 21 Treatment Short Term/ Long Term   Goals   AM-PAC 6 Clicks 64/98     Was pt agreeable to Eval/treatment? Yes     Does pt have pain? Mild pain in back     Bed Mobility  Rolling: Yojana  Supine to sit: Yojana  Sit to supine: NT  Scooting: Yojana  Rolling: Independent    Supine to sit:  Independent    Sit to supine: Independent    Scooting: Independent     Transfers Sit to stand: Yojana  Stand to sit: Yojana  Stand pivot: Yojana [employer]  Sit to stand: Modified Independent    Stand to sit: Modified Independent    Stand pivot: Modified Independent     Ambulation    40 feet with Yojana WW  >150 feet with Modified Independent  AAD   Stair negotiation: ascended and descended  NT  >4 steps with bilateral rails Modified Independent     ROM BUE:  Defer to OT  BLE:  WFL     Strength BUE:  Defer to OT  BLE:  -3/5 hip, knee, ankle  Improve strength 1 MMT grade   Balance Sitting EOB:  SBA  Dynamic Standing:  Yojana [employer]  Sitting EOB:  Independent    Dynamic Standing:  Modified Independent  AAD     Pt is A & O x 4  Sensation:  WNL  Edema:  WNL        Patient education  Pt educated on role of PT    Patient response to education:   Pt verbalized understanding Pt demonstrated skill Pt requires further education in this area   x x x     ASSESSMENT:    Conditions Requiring Skilled Therapeutic Intervention:    [x]Decreased strength     [x]Decreased ROM  [x]Decreased functional mobility  [x]Decreased balance   [x]Decreased endurance   [x]Decreased posture  []Decreased sensation  [x]Decreased coordination   []Decreased vision  [x]Decreased safety awareness   [x]Increased pain       Comments:  Pt received in supine agreeable to PT evaluation. Pt performing bed mobility via log roll for comfort requiring assist of trunk. Pt requiring steadying assist for functional transfers and ambulation. Pt ambulates with unsteady gait pattern requiring assist throughout at decreased speed. Cues for [employer] approximation. Patient would benefit from continued skilled PT to maximize functional mobility independence. Treatment:  Patient practiced and was instructed in the following treatment:     Bed mobility- verbal cues to facilitate independence   Functional transfers-Verbal cues for proper positioning and sequencing to perform transfers safely with maximum independence.  Gait training-Verbal cues for proper positioning and sequencing using assistive device to maximize functional mobility independence. Pt's/ family goals   1. Return to PLOF    Prognosis is good for reaching above PT goals. Patient and or family understand(s) diagnosis, prognosis, and plan of care. yes    PHYSICAL THERAPY PLAN OF CARE:    PT POC is established based on physician order and patient diagnosis     Referring provider/PT Order:     PT evaluation and treat Start: 06/03/21 1930, End: 06/03/21 1930, ONE TIME, Standing Count: 1 Occurrences, R      Dixie Mittal, APRN - CNP       Diagnosis:  Back pain [M54.9]  Specific instructions for next treatment:  Increase independence with functional mobility.     Current Treatment Recommendations:     [x] Strengthening to improve independence with functional mobility   [x] ROM to improve independence with functional mobility   [x] Balance Training to improve static/dynamic balance and to reduce fall risk  [x] Endurance Training to improve activity tolerance during functional mobility   [x] Transfer Training to improve safety and independence with all functional transfers   [x] Gait Training to improve gait mechanics, endurance and assess need for appropriate assistive device  [x] Stair Training in preparation for safe discharge home and/or into the community   [x] Positioning to prevent skin breakdown and contractures  [x] Safety and Education Training   [x] Patient/Caregiver Education   [x] HEP  [] Other     PT long term treatment goals are located in above grid    Frequency of treatments: 2-5x/week x 1-2 weeks. Time in  1030  Time out  1100    Total Treatment Time  8 minutes     Evaluation Time includes thorough review of current medical information, gathering information on past medical history/social history and prior level of function, completion of standardized testing/informal observation of tasks, assessment of data and education on plan of care and goals.     CPT codes:  [x] Low Complexity PT evaluation 73155  [] Moderate Complexity PT evaluation 79610  [] High Complexity PT evaluation 12373  [] PT Re-evaluation 53635  [] Gait training 57895 0 minutes  [] Manual therapy 64804 0 minutes  [x] Therapeutic activities 77524 8 minutes  [] Therapeutic exercises 99210 0 minutes  [] Neuromuscular reeducation 05451 0 minutes       [facility] PT, DPT   [id number]

## 2021-06-04 NOTE — DISCHARGE INSTR - COC
Continuity of Care Form    Patient Name: Desmond Adame   :  1928  MRN:  [id number]    Admit date:  6/3/2021  Discharge date:  2021    Code Status Order: Full Code   Advance Directives:   [facility] Documentation       Date/Time Healthcare Directive Type of Healthcare Directive Copy in [address] Agent's Name Healthcare Agent's Phone Number    21 1935  Yes, patient has an advance directive for healthcare treatment  --  --  --  --  --            Admitting Physician:  Adis Lewis MD  PCP: Stephany Kaur MD    Discharging Nurse: [facility] Unit/Room#: 6393/5380-Z  Discharging Unit Phone Number: [phone]    Emergency Contact:   Extended Emergency Contact Information  Primary Emergency Contact: Farshad Ghotra  Address: [address] [place] [address], [address] [place] [address] Phone: [phone]  Mobile Phone: [phone]  Relation: Child  Secondary Emergency Contact[facility] Phone: [phone]  Mobile Phone: [phone]  Relation: Child  Preferred language: English   needed? No    Past Surgical History:  Past Surgical History:   Procedure Laterality Date    CATARACT REMOVAL WITH IMPLANT Left 2016    HERNIA REPAIR Left 2019    LEFT INGUINAL HERNIA REPAIR performed by Renetta Nguyen MD at [address] [facility] Right 2015    ORIF left hip inter & subtrochanteric fx. w/long gamma nail. Stacy Rodriguez MD    HYSTERECTOMY  ?     robotic    KYPHOSIS SURGERY  2017    T8    KYPHOSIS SURGERY      KYPHOSIS SURGERY N/A 6/10/2019    T11 VERTEBRAL BODY BIOPSY & KYPHOPLASTY performed by Joycelyn Juares MD at [address] [facility] 2019    KYPHOPLASTY T11, L2 performed by Joycelyn Juares MD at [address] [facility] 2019    POSSIBLE LAPAROTOMY  POSSIBLE BOWEL RESECTION performed by Renetta Nguyen MD at [address] [place] [address] the diagnosis listed and that she requires [place] for less 30 days.      Update Admission H&P: No change in H&P    PHYSICIAN SIGNATURE:  Electronically signed by JERMAN Naidu CNP on 6/4/21 at 1:30 PM EDT

## 2021-06-04 NOTE — PROGRESS NOTES
Occupational Therapy  OCCUPATIONAL THERAPY INITIAL EVALUATION     [address] [address] [address]      Date:2021                                                Patient Name: Sathish Landon  MRN: [id number]  : 1928  Room: [address] [place] [address]    Evaluating OT: Lennox Saldivar OTR/L #[id number]     Referring Provider: JERMAN Rodriguez CNP  Specific Provider Orders/Date: OT eval and treat 6/3/21    Diagnosis: Back pain   Pt admitted to hospital d/t back pain    Pertinent Medical History: HTN, Parkinson's Disease, Gastric Reflux      Precautions:  Fall Risk, Spine Neutrality    Assessment of current deficits    [x] Functional mobility  [x]ADLs  [x] Strength               []Cognition    [x] Functional transfers   [x] IADLs         [x] Safety Awareness   [x]Endurance    [x] Fine Coordination              [x] Balance      [] Vision/perception   []Sensation     []Gross Motor Coordination  [] ROM  [] Delirium                   [] Motor Control     OT PLAN OF CARE   OT POC based on physician orders, patient diagnosis and results of clinical assessment    Frequency/Duration: 1-3 days/wk for 2 weeks PRN   Specific OT Treatment Interventions to include:   * Instruction/training on adapted ADL techniques and AE recommendations to increase functional independence within        precautions  * Training on energy conservation strategies, correct breathing pattern and techniques to improve independence/tolerance for self-care routine  * Functional transfer/mobility training/DME recommendations for increased independence, safety, and fall prevention  * Patient/Family education to increase follow through with safety techniques and functional independence  * Recommendation of environmental modifications for increased safety with functional transfers/mobility and ADLs  * Therapeutic activities to facilitate/challenge dynamic balance, stand tolerance for increased safety and independence with ADLs  * Therapeutic activities to facilitate gross/fine motor skills for increased independence with ADLs  * Positioning to improve skin integrity, interaction with environment and functional independence    Recommended Adaptive Equipment: TBD     Home Living: Pt lives with [place] in [place] home with 4 TRES (1HR) to enter. 1st floor set-up   Bathroom setup: tub/shower    Equipment owned: w/w, Rollator    Prior Level of Function: Assistance with ADLs , Assistance with IADLs; ambulated with Rollator   Driving: no   Occupation: not stated    Pain Level: Pt reports no pain this session    Cognition: A&O: 4/4; Follows 2 step directions   Memory:  Good   Sequencing:  Fair   Problem solving:  Fair   Judgement/safety:  F-     Functional Assessment:  AM-PAC Daily Activity Raw Score: 18/24   Initial Eval Status  Date: 6/4/21 Treatment Status  Date: STGs = LTGs  Time frame: 10-14 days   Feeding Set-Up  Independent   Grooming Min A    Standing at sink to B wash hands  Supervision    UB Dressing Minimal Assist   Supervision    LB Dressing Minimal Assist   Supervision    Bathing Minimal Assist  Supervision    Toileting Minimal Assist   Supervision    Bed Mobility  Supine to sit: Minimal Assist   Sit to supine: NT  Pt in chair at end of session (RN notified)  Supine to sit: Supervision   Sit to supine: Supervision    Functional Transfers Minimal Assist     Sit<>stand using w/w  Modified [place]    Functional Mobility Minimal Assist     Functional mobility using w/w to/from bathroom  Modified [place]    Balance Sitting:     Static:  SBA    Dynamic:Min A  Standing: Min A  Sitting:     Static:  Independent    Dynamic:Supervision  Standing:  Mod I   Activity Tolerance Fair  Good   Visual/  Perceptual Glasses: yes, wfl                  Hand Dominance Right   AROM (PROM) Strength Additional Info:    RUE  Grossly wfl, shoulder flexion 1/2 ROM NT for safety of spine d/t old lumbar fx's  Grossly wfl good  and wfl FMC/dexterity noted during ADL tasks       LUE Grossly wfl, shoulder flexion 1/2 ROM NT for safety of spine d/t old lumbar fx's  Grossly wfl good  and wfl FMC/dexterity noted during ADL tasks       Hearing: WFL   Sensation:  No c/o numbness or tingling   Tone: WFL   Edema: WFL    Comments: Nursing clearance obtained prior to session. Upon arrival patient supine in bed and agreeable to OT session. Therapist educated pt on role of OT. At end of session, patient seated in chair (RN notified) with call light and phone within reach, all lines and tubes intact. Overall patient demonstrated decreased independence and safety during completion of ADL/functional transfer/mobility tasks. Pt would benefit from continued skilled OT to increase safety and independence with completion of ADL/IADL tasks for functional independence and quality of life. Treatment: OT treatment provided this date includes: Therapist educated pt on role of OT and maintaining spine neutrality. Therapist facilitated bed mobility (supine to sit using log roll technique, scooting), functional transfers from multiple surfaces (sit<>stands to/from bed/chair/toilet), graded functional activities (static/dynamic sitting at EOB/chair, static/dynamic standing, functional reaching) to address activity tolerance, and functional mobility using w/w (to/from bathroom). Therapist provided minimal verbal cueing on w/w management, hand placement, body mechanics, posture, energy conservation, and safety. Therapist facilitated ADL retraining (LB dressing (don/doff pants/underwear in bathroom), grooming tasks (B washing hands standing at sink, B combing hair seated EOB), toileting (toilet hygiene/ clothing management in bathroom). Therapist provided minimal verbal cueing on w/w management, hand placement, body mechanics, posture, energy conservation, and safety.  Therapist educated pt on spine neutrality, adaptive equipment, energy conservation, and safety. Skilled monitoring of O2 sats, HR, and pt response throughout treatment. Rehab Potential: Good for established goals     Patient / Family Goal: not stated      Patient and/or family were instructed on functional diagnosis, prognosis/goals and OT plan of care. Demonstrated Fair understanding. Eval Complexity: Moderate complexity evaluation due extensive chart review, complex assessment of functional performance skills and complexity of POC. Time In: 1020  Time Out: 1050  Total Treatment Time: 15 minutes    Min Units   OT Eval Low 61931       OT Eval Medium 97166  X 1   OT Eval High 79628      OT Re-Eval [id number]       Therapeutic Ex 91251       Therapeutic Activities 46510  5     ADL/Self Care 72133  10  1   Orthotic Management 98690       Manual 21796     Neuro Re-Ed 99901       Non-Billable Time          Evaluation Time additionally includes thorough review of current medical information, gathering information on past medical history/social history and prior level of function, interpretation of standardized testing/informal observation of tasks, assessment of data and development of plan of care and goals.         Kelsey Opitz, S/OT    Sophia Chavez OTR/L #[id number]

## 2021-06-04 NOTE — DISCHARGE SUMMARY
Physician Discharge Summary     Patient ID:  Sandra Ingram  [id number]  46 y.o.  7/8/1928    Admit date: 6/3/2021    Discharge date and time: 6/4/2021    Admission Diagnoses:   Patient Active Problem List   Diagnosis    [facility]trochan[facility] right femur [facility]    Parkinson's disease ([facility]    Renal insufficiency    Hypertension    Abdominal pain    Chronic bilateral thoracic back pain    Thoracic compression fracture, closed, initial encounter ([facility]    SBO (small bowel obstruction) ([facility]    Red blood cell antibody positive    Moderate protein-calorie malnutrition (HCC)    Compression fracture of L2 vertebra (HCC)    Lumbar compression fracture, closed, initial encounter ([facility]    [facility]oxysmal [facility] fibrillation) ([facility]    Low back pain    Primary osteoarthritis of left knee    Back pain       Discharge Diagnoses: Back pain     Consults: none    Procedures: none     Hospital Course: The patient is a 80 y.o. female of Yuly Cooney MD with significant past medical history of HTN, PAF, osteoarthritis who presents with back pain. Patient presented to the ED with complaints of her back pain worsening and she is unable to care for herself as she lives alone. Social work was consulted for placement. Patient was accepted to [facility] and will be discharged there. Patient was not started on any new medication during her hospital stay. Recent Labs     06/03/21 1817 06/04/21  0502   WBC 8.7 5.6   HGB 12.5 10.7*   HCT 39.6 33.1*    163       Recent Labs     06/03/21  1817 06/04/21  0502    140   K 4.7 4.5    104   CO2 31* 28   BUN 27* 27*   CREATININE 1.2* 1.2*   CALCIUM 9.7 9.1       CT LUMBAR SPINE WO CONTRAST    Result Date: 6/3/2021  EXAMINATION: CT OF THE LUMBAR SPINE WITHOUT CONTRAST  6/3/2021 TECHNIQUE: CT of the lumbar spine was performed without the administration of intravenous contrast. Multiplanar reformatted images are provided for review.  Dose modulation, iterative reconstruction, and/or weight based adjustment of the mA/kV was utilized to reduce the radiation dose to as low as reasonably achievable. COMPARISON: Previous MRI lumbar spine January 23, 2020 HISTORY: ORDERING SYSTEM PROVIDED HISTORY: back pain TECHNOLOGIST PROVIDED HISTORY: Reason for exam:->back pain Decision Support Exception - unselect if not a suspected or confirmed emergency medical condition->Emergency Medical Condition (MA) What reading provider will be dictating this exam?->CRC FINDINGS: Previous vertebroplasty of a L2 and T12 with loss of height of the vertebral bodies. Old depression of the inferior endplates of L4, L3, and the L1 with minimal depression of the superior endplate of these vertebral bodies. Degenerative changes seen predominant in at L5-S1 disc space level. Generalized osteopenia is observed. There is broad posterior disc displacement associated with some bone protrusion from depressed fracture in the inferior endplate of L3 and L4 and with correspond facet/ligamentum flava hypertrophy are observed causing spinal canal stenosis in moderate-to-severe degree at L4-5 and moderate degree at L3-4. No conspicuous acute new displaced fractures seen in the lumbar spine superimposed previous injuries. The pedicles, transverse process and spinous process appears intact. 1.  Generalized osteopenia of visualized bones structures. 2.  Previous vertebroplasty in L2 and T11. 3.  The pressure of endplates at multiple levels a seen at L1, L3 and L4 which are likely old findings. 4.  Spinal canal stenosis of moderate-to-severe degree at L4-5 and of moderate degree at L3-4. The       Discharge Exam:    HEENT: NCAT,  PERRLA, No JVD  Heart:  RRR, no murmurs, gallops, or rubs.   Lungs:  CTA bilaterally, no wheeze, rales or rhonchi  Abd: bowel sounds present, nontender, nondistended, no masses  Extrem:  No clubbing, cyanosis, or edema    Disposition: SNF     Patient Condition at Discharge: Stable    Patient Instructions:      Medication List      CHANGE how you take these medications    docusate 100 MG Caps  Commonly known as: COLACE, DULCOLAX  Take 100 mg by mouth 2 times daily as needed for Constipation  What changed:   · when to take this  · reasons to take this        CONTINUE taking these medications    acetaminophen 500 MG tablet  Commonly known as: APAP Extra Strength  Take 2 tablets by mouth 2 times daily     bisacodyl 10 MG suppository  Commonly known as: DULCOLAX     carbidopa-levodopa  MG per tablet  Commonly known as: SINEMET     ENSURE NUTRITION SHAKE PO     furosemide 20 MG tablet  Commonly known as: LASIX  Take 1 tablet by mouth 2 times daily     Jantoven 3 MG tablet  Generic drug: warfarin     magnesium hydroxide 400 MG/5ML suspension  Commonly known as: MILK OF MAGNESIA     metoprolol tartrate 25 MG tablet  Commonly known as: LOPRESSOR  Take 1 tablet by mouth 2 times daily     Soothe XP Soln     spironolactone 25 MG tablet  Commonly known as: ALDACTONE     traMADol 50 MG tablet  Commonly known as: ULTRAM     vitamin D3 25 MCG (1000 UT) Tabs tablet  Commonly known as: CHOLECALCIFEROL        STOP taking these medications    medicated lip balm 2-2.5-6.6 % Stck           Where to Get Your Medications      These medications were sent to [address] [facility],[address], [address]    Phone: [phone]   · docusate 100 MG Caps       Activity: activity as tolerated  Diet: regular diet    Pt has been advised to: Follow-up with Crayton Merlin, MD in 1 week.   Follow-up with consultants as recommended by them    Note that over 30 minutes was spent in preparing discharge papers, discussing discharge with patient, medication review, etc.    Signed:  JERMAN Mora CNP  6/4/2021  1:30 PM

## 2021-06-04 NOTE — CARE COORDINATION
Transportation has been arranged for 1500 with Bee Rosenbaum. Son, Laura Pacheco, and nursing aware of the time.  Makayla Cruz -[phone]

## 2021-06-04 NOTE — PROGRESS NOTES
Nurse to Nurse called to receiving nurse Ashley Contreras at [facility]. All questions answered and patient ready for discharge.

## 2021-06-04 NOTE — H&P
[address] [facility]  History and Physical      CHIEF COMPLAINT: Back pain       Patient of Stephany Kaur MD presents with:  Back pain    History of Present Illness:   Patient is a 45-year-old female with past medical history of HTN, Parkinson's disease, and compression fractures. Patient presented to the ED with complaints of back pain. Patient states her back pain is worsening and she is unable to care for herself as she lives alone. Patient admits her ADLs increases her back pain. Patient only takes Tylenol or Aleve for relief. Patient admits this has been ongoing problem over the past 2 years. Patient denies any bowel or bladder incontinence chest pain, shortness of breath, fever, chills. REVIEW OF SYSTEMS:  Pertinent negatives are above in HPI. 10 point ROS otherwise negative. Past Medical History:   Diagnosis Date    Decreased ambulation status     uses cane    Gastric reflux     Hypertension     Left cataract 8-11-16    for removal    Parkinson disease ([facility]     tremors of hands/arm / left leg    Vitamin D deficiency          Past Surgical History:   Procedure Laterality Date    CATARACT REMOVAL WITH IMPLANT Left 12/06/2016    HERNIA REPAIR Left 8/5/2019    LEFT INGUINAL HERNIA REPAIR performed by Renetta Nguyen MD at [facility] Right 11/16/2015    ORIF left hip inter & subtrochanteric fx. w/long gamma nail. Lula Jones MD    HYSTERECTOMY  2011? robotic    KYPHOSIS SURGERY  01/11/2017    T8    KYPHOSIS SURGERY      KYPHOSIS SURGERY N/A 6/10/2019    T11 VERTEBRAL BODY BIOPSY & KYPHOPLASTY performed by Joycelyn Juares MD at [address] [place] [address] 8/8/2019    KYPHOPLASTY T11, L2 performed by Joycelyn Juares MD at [facility] 8/5/2019    POSSIBLE LAPAROTOMY  POSSIBLE BOWEL RESECTION performed by Renetta Nguyen MD at [address] Right 01/16/1997    Right TKA.   Lesley Garcia Eric Kim MD       Medications Prior to Admission:    Medications Prior to Admission: spironolactone (ALDACTONE) 25 MG tablet, Take 25 mg by mouth daily  Nutritional Supplements (ENSURE NUTRITION SHAKE PO), Take 0.5 Bottles by mouth daily  JANTOVEN 3 MG tablet, 3 mg   furosemide (LASIX) 20 MG tablet, Take 1 tablet by mouth 2 times daily  metoprolol tartrate (LOPRESSOR) 25 MG tablet, Take 1 tablet by mouth 2 times daily  medicated lip balm (BLISTEX/CARMEX) 2-2.5-6.6 % STCK, Apply topically as needed for Dry Lips  Artificial Tear Solution (SOOTHE XP) SOLN, Place 1 drop into the left eye 3 times daily  Cholecalciferol (VITAMIN D3) 1000 UNITS TABS, Take 1 tablet by mouth every morning   carbidopa-levodopa (SINEMET)  MG per tablet, Take 1 tablet by mouth 2 times daily   acetaminophen (APAP EXTRA STRENGTH) 500 MG tablet, Take 2 tablets by mouth 2 times daily  traMADol (ULTRAM) 50 MG tablet, Take 50 mg by mouth every 6 hours as needed for Pain.  magnesium hydroxide (MILK OF MAGNESIA) 400 MG/5ML suspension, Take by mouth daily as needed for Constipation  bisacodyl (DULCOLAX) 10 MG suppository, Place 10 mg rectally daily as needed for Constipation    Note that the patient's home medications were reviewed and the above list is accurate to the best of my knowledge at the time of the exam.    Allergies:    Pcn [penicillins]    Social History:    reports that she has never smoked. She has never used smokeless tobacco. She reports that she does not drink alcohol and does not use drugs.     Family History:   Unable to obtain      PHYSICAL EXAM:    Vitals:  BP (!) 194/80   Pulse 64   Temp 98 °F (36.7 °C) (Temporal)   Resp 16   Ht 5' 4\" (1.626 m)   Wt 100 lb 1.4 oz (45.4 kg)   SpO2 92%   BMI 17.18 kg/m²       General appearance: NAD, conversant, frail  Eyes: Sclerae anicteric, PERRLA  HEENT: AT/NC, MMM  Neck: FROM, supple, no thyromegaly  Lymph: No cervical / supraclavicular lymphadenopathy  Lungs: Clear to compression fractures, Parkinson's disease, and hypertension admitted with    Back pain  -PT OT  -Pain management    Patient is unable to care for herself as she lives alone will seek social work for placement subacute rehab. Medication for other comorbidities continue as appropriate dose adjustment as necessary. DVT prophylaxis  Discharge planning  Case discussed with attending and agreed upon plan of care. Code status: Full  Requires inpatient level of care  Mariangel LandryJERMAN mariscal - CNP    12:41 PM  6/4/2021     Patient with known history of compression fractures historically, CT scanning of the lumbar spine is unremarkable just with old fractures noted in vertebral plasties  Continue pain control  Needs placement  Okay for discharge today to [facility], stable condition  Unremarkable blood work and vital signs  Resume home medications    I personally saw, examined and provided care for the patient. Radiographs, labs and medication list were reviewed by me independently. The case was discussed in detail and plans for care were established. Review of [address] [place] [address], documentation was conducted and revisions were made as appropriate directly by me. I agree with the above documented exam, problem list, and plan of care.      Matheus Moore MD  2:14 PM  6/4/2021

## 2021-07-16 NOTE — ED NOTES
Bed:  VERDUZCO-06  Expected date:   Expected time:   Means of arrival:   Comments:  EMS LF     Amee Jain RN  07/16/21 1410

## 2021-07-16 NOTE — ED PROVIDER NOTES
The history is provided by the patient and medical records. 80-year-old female past medical history of atrial fibrillation on Coumadin presents emergency department with reported skin tear that will not stop bleeding along with having an elevated INR at 8.6. Patient states she is from a nursing facility they gave her Coumadin every day. She was getting her sweatpants taken off from a nurse and that resulted in a skin tear on her right lower extremity. As bleeding since then. Has not stopped on its own. She has no complaints of any pain at this time. Denies any rectal bleeding denies any vaginal bleeding denies any other complaints. Denies any chest pain shortness of breath. Otherwise asymptomatic. The patient presents with elevated INR with skin tear bleeding that has been going on for 1 day. These symptoms are moderate in severity. Symptoms are made better by nothing. Symptoms are made worse by nothing. Associated symptoms include none. Review of Systems   Constitutional: Negative for chills and fever. HENT: Negative for ear pain, sinus pressure and sore throat. Eyes: Negative for pain, discharge and redness. Respiratory: Negative for cough, shortness of breath and wheezing. Cardiovascular: Negative for chest pain. Gastrointestinal: Negative for abdominal distention, diarrhea, nausea and vomiting. Genitourinary: Negative for dysuria and frequency. Musculoskeletal: Negative for arthralgias and back pain. Skin tear   Skin: Negative for rash and wound. Neurological: Negative for weakness and headaches. Hematological: Negative for adenopathy. All other systems reviewed and are negative. Physical Exam  Vitals and nursing note reviewed. Constitutional:       General: She is not in acute distress. Appearance: Normal appearance. She is normal weight. She is not toxic-appearing. HENT:      Head: Normocephalic and atraumatic.    Eyes:      Conjunctiva/sclera: Conjunctivae normal.   Cardiovascular:      Rate and Rhythm: Normal rate and regular rhythm. Pulses: Normal pulses. Heart sounds: Normal heart sounds. No murmur heard. No gallop. Pulmonary:      Effort: Pulmonary effort is normal. No respiratory distress. Breath sounds: Normal breath sounds. No wheezing or rales. Abdominal:      General: Abdomen is flat. Bowel sounds are normal. There is no distension. Palpations: Abdomen is soft. Tenderness: There is no abdominal tenderness. There is no guarding. Skin:     General: Skin is warm and dry. Capillary Refill: Capillary refill takes less than 2 seconds. Comments: Skin tear, slight oozing noted   Neurological:      General: No focal deficit present. Mental Status: She is alert and oriented to person, place, and time. Psychiatric:         Mood and Affect: Mood normal.         Thought Content: Thought content normal.          Procedures     MDM   26-year-old female presents emerged department complaint of skin tear with oozing of blood that was not being controlled to happen yesterday. Her reported INR was 8.6 INR here for us was 9.7. Surgicel was placed on the wound twice with controlling of her bleeding and she was safe to be discharged home at this time. Her INR was also reversed due to her having bleeding and due to his elevation with vitamin K. She is advised to not take Coumadin until she follows up with her primary care doctor, she should follow with them on Monday and have repeat labs drawn as well. Patient agreeable this plan moving forward. She is safe for discharge back to her nursing facility at this time. ED Course as of Jul 16 1705 Fri Jul 16, 2021   1551 Patient was given vitamin K for her elevated INR and bleeding, Surgicel was also placed on her skin tear that was still bleeding. Reevaluated and was still having some bleeding from that site and additional Surgicel was placed.     [CB]   3029 Patient's plan at this time seems to be controlled she is not actively bleeding through the dressing at this time. Will reassess and further time    [CB]      ED Course User Index  [CB] Christina Ireland MD        ED Course as of Jul 16 1705 Fri Jul 16, 2021   1551 Patient was given vitamin K for her elevated INR and bleeding, Surgicel was also placed on her skin tear that was still bleeding. Reevaluated and was still having some bleeding from that site and additional Surgicel was placed. [CB]   1064 Patient's plan at this time seems to be controlled she is not actively bleeding through the dressing at this time. Will reassess and further time    [CB]      ED Course User Index  [CB] Christina Ireland MD       --------------------------------------------- PAST HISTORY ---------------------------------------------  Past Medical History:  has a past medical history of Decreased ambulation status, Gastric reflux, Hypertension, Left cataract, Parkinson disease ([facility] and Vitamin D deficiency. Past Surgical History:  has a past surgical history that includes Hip fracture surgery (Right, 11/16/2015); Hysterectomy (2011?); Tonsillectomy; Cataract removal with implant (Left, 12/06/2016); Total knee arthroplasty (Right, 01/16/1997); Kyphosis surgery (01/11/2017); Kyphosis surgery; Kyphosis surgery (N/A, 6/10/2019); hernia repair (Left, 8/5/2019); LAPAROTOMY EXPLORATORY (N/A, 8/5/2019); and Kyphosis surgery (N/A, 8/8/2019). Social History:  reports that she has never smoked. She has never used smokeless tobacco. She reports that she does not drink alcohol and does not use drugs. Family History: family history is not on file. The patients home medications have been reviewed.     Allergies: Pcn [penicillins]    -------------------------------------------------- RESULTS -------------------------------------------------  Labs:  Results for orders placed or performed during the hospital encounter of 07/16/21   CBC Auto Differential   Result Value Ref Range    WBC 7.0 4.5 - 11.5 E9/L    RBC 2.93 (L) 3.50 - 5.50 E12/L    Hemoglobin 9.5 (L) 11.5 - 15.5 g/dL    Hematocrit 30.1 (L) 34.0 - 48.0 %    .7 (H) 80.0 - 99.9 fL    MCH 32.4 26.0 - 35.0 pg    MCHC 31.6 (L) 32.0 - 34.5 %    RDW 16.7 (H) 11.5 - 15.0 fL    Platelets 399 778 - 641 E9/L    MPV 11.4 7.0 - 12.0 fL    Neutrophils % 70.9 43.0 - 80.0 %    Immature Granulocytes % 0.6 0.0 - 5.0 %    Lymphocytes % 17.5 (L) 20.0 - 42.0 %    Monocytes % 9.3 2.0 - 12.0 %    Eosinophils % 1.6 0.0 - 6.0 %    Basophils % 0.1 0.0 - 2.0 %    Neutrophils Absolute 4.96 1.80 - 7.30 E9/L    Immature Granulocytes # 0.04 E9/L    Lymphocytes Absolute 1.22 (L) 1.50 - 4.00 E9/L    Monocytes Absolute 0.65 0.10 - 0.95 E9/L    Eosinophils Absolute 0.11 0.05 - 0.50 E9/L    Basophils Absolute 0.01 0.00 - 0.20 E9/L   APTT   Result Value Ref Range    aPTT 66.1 (H) 24.5 - 35.1 sec   Protime-INR   Result Value Ref Range    Protime 104.4 (H) 9.3 - 12.4 sec    INR 9.7 (HH)        Radiology:  No orders to display       ------------------------- NURSING NOTES AND VITALS REVIEWED ---------------------------  Date / Time Roomed:  7/16/2021 12:21 PM  ED Bed Assignment:  [id number]/[id number]    The nursing notes within the ED encounter and vital signs as below have been reviewed. BP (!) 145/62   Pulse 52   Temp 98 °F (36.7 °C) (Temporal)   Resp 16   SpO2 98%   Oxygen Saturation Interpretation: Normal      ------------------------------------------ PROGRESS NOTES ------------------------------------------  4:40 PM EDT  I have spoken with the patient and discussed todays results, in addition to providing specific details for the plan of care and counseling regarding the diagnosis and prognosis. Their questions are answered at this time and they are agreeable with the plan. I discussed at length with them reasons for immediate return here for re evaluation.  They will followup with their primary care physician by calling their office on Monday.      --------------------------------- ADDITIONAL PROVIDER NOTES ---------------------------------  At this time the patient is without objective evidence of an acute process requiring hospitalization or inpatient management. They have remained hemodynamically stable throughout their entire ED visit and are stable for discharge with outpatient follow-up. The plan has been discussed in detail and they are aware of the specific conditions for emergent return, as well as the importance of follow-up. New Prescriptions    No medications on file       Diagnosis:  1. Elevated international normalized ratio (INR) due to prior anticoagulant medication ingestion    2. Noninfected skin tear of leg, right, initial encounter        Disposition:  Patient's disposition: Discharge to home  Patient's condition is stable.     The patient was seen and evaluated by myself and Dr. Kevyn Mazariegos MD PGY-2  7/16/2021 5:05 PM       Christina Ireland MD  Resident  07/16/21 [id number]

## 2021-07-22 NOTE — TELEPHONE ENCOUNTER
Patient's daughter called and stated that her mom is in [address] [facility] for PT and she is still in a great amount of pain. The patient has a 1:30 appointment to discuss her future options. She is currently there for PT. Please call the daughter Nickie Church [phone]) back she would like to know how her mother's x-rays came out from the ER trip on 06/03/2021. She said all they told the mom was no fractures.

## 2021-07-23 NOTE — TELEPHONE ENCOUNTER
Pts daughter still waiting to hear back about her mothers testing.  She can be reached at [phone]

## 2021-08-03 NOTE — TELEPHONE ENCOUNTER
Patient's daughter Kerline Willis called and wanted to know what the results of her moms MRI's were that she had done yesterday and if she needs a follow up appointment. She would like someone to call her please.   ([phone])

## 2021-08-03 NOTE — TELEPHONE ENCOUNTER
Miguel Winslow called, results given:  the family will  an orthotist script for TLSO for acute L1 compression fracture

## 2021-08-12 NOTE — TELEPHONE ENCOUNTER
It looks like patient was called by Lela Nearing the other day. Pt has an acute L1 compression fracture. Script for TLSO brace was ordered. We will fax over to [facility] along with lumbar x-rays that she will have completed prior to her 1 month follow up.

## 2021-09-02 NOTE — PROGRESS NOTES
Office Follow-up     This is a 80year old female who presents to the office for a one month follow-up for acute L1 compression fracture     Subjective: Shaylee Jean Baptiste is a 80 y.o.  female who called our office c/o increased back pain. MRI demonstrates acute L1 compression fracture and pt was placed in a soft TLSO brace. She presents to the office today for a one month follow up. Pt presents from [address]. It was documented 8/16/21 that pt is non-compliant with her brace. Daughter is present in the room and states brace does not fit patient and she refuses to wear it. Pt is currently being worked up for gall bladder issues. Lumbar x-rays reviewed from an outside facility     Physical Exam:              WDWN, no apparent distress   Presents in a wheelchair              Non-labored breathing               Vitals Stable              Alert and oriented x3              PERRL              FROST well              Motor strength symmetric              Sensation to LT intact bilaterally   No significant tenderness to palpation of spine   Tenderness to palpation of RUQ. Abdomen distended                  Imaging: lumbar x-rays stable L1 compression deformity. No acute issues noted.      Assessment: This is a 80 y.o.  female presenting for a one month follow-up for L1 compression fracture     Plan:  -Pt is non-compliant with brace. Discussed importance of brace and pt states it increases her pain when it is on and does not wish to wear it.  -Continue evaluation of gallbladder. I feel this is her main source of pain  -OARRS report reviewed  -Follow-up in neurosurgery clinic PRN  -Call or return to neurosurgery office sooner if symptoms worsen or if new issues arise in the interim.     Electronically signed by Neo García PA-C on 9/2/2021 at 1:11 PM

## 2021-09-29 NOTE — H&P (VIEW-ONLY)
Name: Uri lBair             : 1928 Sex: F  Age: 80 yrs  Acct#:  [id number]            CC: Routine follow-up    HPI: [The plain films of the abdomen did not show any significant pathology. Again, the patient is 80 and has cholelithiasis. The issue becomes if her cholelithiasis is symptomatic. The patient does complain of upper abdominal pain. She states that her pain is more noted on the left. Although, she states that food makes her symptoms worse. ]    Meds Prior to Visit:  Aspirin     Cyclobenzaprine HCL     Dulcolax     Eliquis     Lasix     Metoprolol Tartrate     Milk Of Magnesia     Miralax     Multivitamin Adults     Secura Protective     Sinemet     Soothe XP     Tylenol     Vitamin D (Cholecalciferol)        Allergies:  Penicillin        Wt Prior: 100lb as of 21    Exam:    On examination: The abdomen is soft, slightly distended and nontender. Assessment #1: Hx K80.20 Calculus of gallbladder without cholecystitis without obstruction   Care Plan:              Comments       :  I will speak with Dr. Cassie Sheffield regarding the patient's surgical risk. I do not believe that the patient's symptoms are completely related to her cholelithiasis. Moderate complexity    I performed an updated history, physical examination, suspended and plan.              Seen by:

## 2021-10-14 NOTE — PROGRESS NOTES
Have you been tested for COVID  No           Have you been told you were positive for COVID No  Have you had any known exposure to someone that is positive for COVID No  Do you have a cough                   No              Do you have shortness of breath No                 Do you have a sore throat            No                Are you having chills                    No                Are you having muscle aches. No                    Please come to the hospital wearing a mask and have your significant other wear a mask as well. Both of you should check your temperature before leaving to come here,  if it is 100 or higher please call [phone] for instruction. Patrick PRE-ADMISSION TESTING INSTRUCTIONS      ARRIVAL INSTRUCTIONS:  [x] Parking the day of Surgery is located in the Main Entrance lot. Upon entering the main door make an immediate right to the surgery reception desk.     [x] Bring photo ID and insurance card        GENERAL INSTRUCTIONS:    [x] Nothing by mouth after midnight, including gum, candy, mints or water    [x] You may brush your teeth, but do not swallow any water    [x] Take medications as instructed with 1-2 oz of water    [x] Stop herbal supplements and vitamins 5 days prior to procedure    [x] Follow preop dosing of blood thinners per physician instructions    [x] Shower or bath with soap, lather and rinse well, AM of Surgery, no lotion, powders or creams to surgical site    [x] Jewelry, body piercing's, eyeglasses, contact lenses and dentures are not permitted into surgery (bring cases)      [x] Please do not wear any nail polish, make up or hair products on the day of surgery    [x] You can expect a call the business day prior to procedure to notify you if your arrival time changes    [x] Please notify surgeon if you develop any illness between now and time of surgery (cold, cough, sore throat, fever, nausea, vomiting) or any signs of infections including skin, wounds, and dental.

## 2021-10-15 PROBLEM — Z87.19 H/O CHOLELITHIASIS: Status: ACTIVE | Noted: 2021-01-01

## 2021-10-15 NOTE — ANESTHESIA PRE PROCEDURE
Department of Anesthesiology  Preprocedure Note       Name:  Angel Salazar   Age:  80 y.o.  :  1928                                          MRN:  [id number]         Date:  10/15/2021      Surgeon: Beverly Chan):  Nika Portillo MD    Procedure: Procedure(s):  LAPAROSCOPIC ROBOTIC ASSISTED CHOLECYSTECTOMY POSSIBLE OPEN POSSIBLE GRAM    Medications prior to admission:   Prior to Admission medications    Medication Sig Start Date End Date Taking? Authorizing Provider   apixaban (ELIQUIS) 2.5 MG TABS tablet Take by mouth 2 times daily   Yes Historical Provider, MD   ferrous sulfate (IRON 325) 325 (65 Fe) MG tablet Take 325 mg by mouth daily (with breakfast)   Yes Historical Provider, MD   Multiple Vitamin (MULTIVITAMIN ADULT PO) Take 1 tablet by mouth daily    Yes Historical Provider, MD   docusate sodium (COLACE, DULCOLAX) 100 MG CAPS Take 100 mg by mouth 2 times daily as needed for Constipation 21  Yes Aleda Poag Learn, APRN - CNP   spironolactone (ALDACTONE) 25 MG tablet Take 25 mg by mouth daily   Yes Historical Provider, MD   acetaminophen (APAP EXTRA STRENGTH) 500 MG tablet Take 2 tablets by mouth 2 times daily 20  Yes Carlitos Hammond DO   traMADol (ULTRAM) 50 MG tablet Take 50 mg by mouth every 6 hours as needed for Pain.    Yes Historical Provider, MD   furosemide (LASIX) 20 MG tablet Take 1 tablet by mouth 2 times daily 19  Yes Rhonda Justin MD   metoprolol tartrate (LOPRESSOR) 25 MG tablet Take 1 tablet by mouth 2 times daily 19  Yes Rhonda Justin MD   magnesium hydroxide (MILK OF MAGNESIA) 400 MG/5ML suspension Take by mouth daily as needed for Constipation   Yes Historical Provider, MD   bisacodyl (DULCOLAX) 10 MG suppository Place 10 mg rectally daily as needed for Constipation   Yes Historical Provider, MD   Artificial Tear Solution (SOOTHE XP) SOLN Place 1 drop into the left eye 3 times daily   Yes Historical Provider, MD   Cholecalciferol (VITAMIN D3) 1000 UNITS D deficiency        Past Surgical History:        Procedure Laterality Date    CATARACT REMOVAL WITH IMPLANT Left 12/06/2016    HERNIA REPAIR Left 8/5/2019    LEFT INGUINAL H[place]IA REPAIR [place] [facility] [place]    ORIF left hip inter & subtrochanteric fx. w/long gamma nail. Shira Bowden MD    HYSTERECTOMY  2011? robotic    KYPHOSIS SURGERY  01/11/2017    T8    KYPHOSIS SURGERY      KYPHOSIS SURGERY N/A 6/10/2019    T11 VERTEBRAL BODY BIOPSY & KYPHOPLASTY performed by Tonya Mejía MD at [facility] 8/8/2019    KYPHOPLASTY T11, L2 performed by Tonya Mejía MD at [facility] 8/5/2019    POSSIBLE LAPAROTOMY  POSSIBLE BOWEL RESECTION performed by Feliciano Vergara MD at [address] Right 01/16/1997    Right TKA. Zoie Ac. Chuck Murcia MD       Social History:    Social History     Tobacco Use    Smoking status: Never Smoker    Smokeless tobacco: Never Used   Substance Use Topics    Alcohol use:  No                                Counseling given: Not Answered      Vital Signs (Current):   Vitals:    10/14/21 0843 10/15/21 0800 10/15/21 0820   BP:   109/69   Pulse:   122   Resp:   16   Temp:   98 °F (36.7 °C)   TempSrc:   Temporal   SpO2:   93%   Weight: 96 lb (43.5 kg) 96 lb (43.5 kg)    Height: 5' (1.524 m) 5' (1.524 m)                                               BP Readings from Last 3 Encounters:   10/15/21 109/69   09/02/21 111/62   07/16/21 138/64       NPO Status: Time of last liquid consumption: 2210                        Time of last solid consumption: 2210                        Date of last liquid consumption: 10/14/21                        Date of last solid food consumption: 10/14/21    BMI:   Wt Readings from Last 3 Encounters:   10/15/21 96 lb (43.5 kg)   09/02/21 100 lb (45.4 kg)   06/03/21 100 lb 1.4 oz (45.4 kg)     Body mass index is 18.75 kg/m².    CBC:   Lab Results   Component Value Date    WBC 8.4 10/15/2021    RBC 3.29 10/15/2021    HGB 10.9 10/15/2021    HCT 34.0 10/15/2021    .3 10/15/2021    RDW 13.6 10/15/2021     10/15/2021       CMP:   Lab Results   Component Value Date     08/17/2021    K 4.6 08/17/2021    K 4.5 06/04/2021     08/17/2021    CO2 28 08/17/2021    BUN 27 08/17/2021    CREATININE 1.4 08/17/2021    GFRAA 42 08/17/2021    LABGLOM 35 08/17/2021    GLUCOSE 80 08/17/2021    PROT 6.4 08/17/2021    CALCIUM 9.1 08/17/2021    BILITOT 0.4 08/17/2021    ALKPHOS 96 08/17/2021    AST 12 08/17/2021    ALT <5 08/17/2021       POC Tests: No results for input(s): POCGLU, POCNA, POCK, POCCL, POCBUN, POCHEMO, POCHCT in the last 72 hours. Coags:   Lab Results   Component Value Date    PROTIME 16.9 07/19/2021    INR 1.6 07/19/2021    APTT 66.1 07/16/2021       HCG (If Applicable): No results found for: PREGTESTUR, PREGSERUM, HCG, HCGQUANT     ABGs: No results found for: PHART, PO2ART, JXB7AGS, BGG6JYP, BEART, V8KEJWDD     Type & Screen (If Applicable):  No results found for: LABABO, LABRH    Drug/Infectious Status (If Applicable):  No results found for: HIV, HEPCAB    COVID-19 Screening (If Applicable):   Lab Results   Component Value Date    COVID19 Not Detected 10/05/2021           Anesthesia Evaluation  Patient summary reviewed and Nursing notes reviewed no history of anesthetic complications:   Airway: Mallampati: II  TM distance: >3 FB   Neck ROM: limited  Mouth opening: > = 3 FB Dental:    (+) upper dentures      Pulmonary:Negative Pulmonary ROS breath sounds clear to auscultation                             Cardiovascular:  Exercise tolerance: poor (<4 METS),   (+) hypertension:, dysrhythmias: atrial fibrillation,       ECG reviewed  Rhythm: irregular  Rate: normal  Echocardiogram reviewed               ROS comment: Echo Aug 2019    Summary   Mild concentric left ventricular hypertrophy.    Ejection fraction is visually estimated at 55-65%. The left atrium is moderately dilated. Atrial fibrillation   Mild mitral regurgitation   The aortic valve appears mildly sclerotic. Mild aortic regurgitation. No pulmonary hypertension   No pericardial effusion     Neuro/Psych:   (+) neuromuscular disease:,              ROS comment: Parkinson's disease    Chronic pain GI/Hepatic/Renal:   (+) renal disease: CRI,           Endo/Other:    (+) blood dyscrasia: anemia, arthritis: OA., .                 Abdominal:             Vascular: negative vascular ROS. Other Findings:             Anesthesia Plan      general     ASA 3       Induction: intravenous. MIPS: Postoperative opioids intended and Prophylactic antiemetics administered. Anesthetic plan and risks discussed with patient and child/children. Plan discussed with CRNA and surgical team.            Patient is DNR-CCA. She wishes to remain DNR nisha-operatively. She understands that she will be intubated for the procedure.         Galilea Alford MD   10/15/2021

## 2021-10-15 NOTE — ANESTHESIA POSTPROCEDURE EVALUATION
Department of Anesthesiology  Postprocedure Note    Patient: Lul Alcaraz  MRN: [id number]  YOB: 1928  Date of evaluation: 10/15/2021  Time:  6:13 PM     Procedure Summary     Date: 10/15/21 Room / Location: [facility] OR [address] [place] [address]    Anesthesia Start: 1130 Anesthesia Stop: 1257    Procedure: LAPAROSCOPIC ROBOTIC ASSISTED CHOLECYSTECTOMY (N/A ) Diagnosis: (GALLSTONES)    Surgeons: Yue Nascimento MD Responsible Provider: Lary Peck MD    Anesthesia Type: general ASA Status: 3          Anesthesia Type: general    Bill Phase I: Bill Score: 10    Bill Phase II:      Last vitals: Reviewed and per EMR flowsheets.        Anesthesia Post Evaluation    Patient location during evaluation: PACU  Patient participation: complete - patient participated  Level of consciousness: awake and alert  Pain score: 5  Airway patency: patent  Nausea & Vomiting: no vomiting and no nausea  Complications: no  Cardiovascular status: hemodynamically stable  Respiratory status: spontaneous ventilation  Hydration status: stable

## 2021-10-15 NOTE — CONSULTS
Inpatient Cardiology Consultation      Reason for Consult:  AF    Consulting Physician: Dr Isidro Zafar    Requesting Physician:  Dr Cailin Barrow    Date of Consultation: 10/15/2021    HISTORY OF PRESENT ILLNESS: 79 yo  female known only as inpatient consultation 8/2019 for AF RVR (incarerated hernia) and placed on Eliquis and Lopressor by Dr Lavonne Nageotte and 8/2019 by Dr Fany Morgan for AF with pauses. PMH: HTN, CKD, anemia, Parkinson's, PAF, OAC with Eliquis, and lifelong non smoker. EKG 8/31/2021 @ 1348 SR with PVC's/bigeminy  EKG 8/31/2021 @ 1437 AF 70's    Last dose Eliquis 10/12/2021  10/15/2021 elective Laparoscopic cholecystectomy  Current /81 's AF RVR O2 saturation 100% on 3 liters    Na 133, K+ 5.5, Bun/Cr 25/1.3, Alk phos 138, Hgb 10.9, WBC 8.4    Has not received Lopressor since 10/14/2021    Please note: past medical records were reviewed per electronic medical record (EMR) - see detailed reports under Past Medical/ Surgical History. Past Medical/surgical History:    1. Lifelong non smoker  2. HTN  3. Anemia  4. 1997 R TKA  5. Fragility  6. Hystrectomy  7. 11/2015 Hip hip fracture s.p ORIF  8. Parkinson's  9. Cataract surgery  10. 6/10/2019 T12 kyphoplasty  11. Admission 8/1/2019-8/7/2019 due to incarcerated small bowel hernia with high grade obstruction. 12. 8/2/2019 diagnosed with AF [id number]  13. 8/2019 2.8 second pause  14. 8/2/2019 TTE: EF 55-60%. Mild CLVH. Moderately dilated LA, AF. Mild MR/AI  15. 8/5/2019 left inguinal herniorrhaphy with mesh placement   16. 8/7/2019 AF with 2.8 second pause  17. 8/8/2019  T11, L2 kyphoplasty and bone biopsy  18. DNR-CCA    Medications Prior to admit:  Prior to Admission medications    Medication Sig Start Date End Date Taking?  Authorizing Provider   apixaban (ELIQUIS) 2.5 MG TABS tablet Take by mouth 2 times daily   Yes Historical Provider, MD   ferrous sulfate (IRON 325) 325 (65 Fe) MG tablet Take 325 mg by mouth daily (with breakfast) Yes Historical Provider, MD   Multiple Vitamin (MULTIVITAMIN ADULT PO) Take 1 tablet by mouth daily    Yes Historical Provider, MD   docusate sodium (COLACE, DULCOLAX) 100 MG CAPS Take 100 mg by mouth 2 times daily as needed for Constipation 6/4/21  Yes Estefania Owens Learn, APRN - CNP   spironolactone (ALDACTONE) 25 MG tablet Take 25 mg by mouth daily   Yes Historical Provider, MD   acetaminophen (APAP EXTRA STRENGTH) 500 MG tablet Take 2 tablets by mouth 2 times daily 8/31/20  Yes Eben Villafuerte DO   traMADol (ULTRAM) 50 MG tablet Take 50 mg by mouth every 6 hours as needed for Pain.    Yes Historical Provider, MD   furosemide (LASIX) 20 MG tablet Take 1 tablet by mouth 2 times daily 8/9/19  Yes Demar Burciaga MD   metoprolol tartrate (LOPRESSOR) 25 MG tablet Take 1 tablet by mouth 2 times daily 8/9/19  Yes Demar Burciaga MD   magnesium hydroxide (MILK OF MAGNESIA) 400 MG/5ML suspension Take by mouth daily as needed for Constipation   Yes Historical Provider, MD   bisacodyl (DULCOLAX) 10 MG suppository Place 10 mg rectally daily as needed for Constipation   Yes Historical Provider, MD   Artificial Tear Solution (SOOTHE XP) SOLN Place 1 drop into the left eye 3 times daily   Yes Historical Provider, MD   Cholecalciferol (VITAMIN D3) 1000 UNITS TABS Take 1 tablet by mouth every morning    Yes Historical Provider, MD   carbidopa-levodopa (SINEMET)  MG per tablet Take 1 tablet by mouth 2 times daily    Yes Historical Provider, MD       Current Medications:    Current Facility-Administered Medications: lactated ringers infusion, , IntraVENous, Continuous  sodium chloride flush 0.9 % injection 5-40 mL, 5-40 mL, IntraVENous, 2 times per day  sodium chloride flush 0.9 % injection 5-40 mL, 5-40 mL, IntraVENous, PRN  fentaNYL (SUBLIMAZE) injection 25 mcg, 25 mcg, IntraVENous, Q5 Min PRN  labetalol (NORMODYNE;TRANDATE) injection 5 mg, 5 mg, IntraVENous, Q10 Min PRN    Allergies:  Pcn [penicillins]:itching    Social History:    Lifelong nonsmoker. Denies alcohol use and illicit drug use. Resides at [address] [place] [address] has not ambulates since her kyphoplasty in 8/2021, transfers to wheelchair. Code Status: DNR-CCA      Family History: Non contributory secondary to age    REVIEW OF SYSTEMS:     · Constitutional: Denies fatigue, fevers, chills or night sweats  · Eyes: Denies visual changes or drainage  · ENT: Denies headaches or hearing loss. No mouth sores or sore throat. No epistaxis   · Cardiovascular: Denies chest pain, pressure or palpitations. No lower extremity swelling. · Respiratory: Denies ARRIOLA, cough, orthopnea or PND. No hemoptysis   · Gastrointestinal: + R sided belly pain. Denies hematemesis or anorexia. No hematochezia or melena    · Genitourinary: Denies urgency, dysuria or hematuria. · Musculoskeletal: + lower back pain. + non ambulatory. · Integumentary: Denies rash, hives or pruritis   · Neurological: Denies dizziness, headaches or seizures. No numbness or tingling  · Psychiatric: Denies anxiety or depression. · Endocrine: Denies temperature intolerance. No recent weight change. .  · Hematologic/Lymphatic: Denies abnormal bruising or bleeding. No swollen lymph nodes    PHYSICAL EXAM:   /81   Pulse 121   Temp 98.6 °F (37 °C) (Temporal)   Resp (!) 31   Ht 5' (1.524 m)   Wt 96 lb (43.5 kg)   SpO2 100%   BMI 18.75 kg/m²   CONST:  Well developed, thin ill appearing elderly  female who appears of stated age. Awake, alert and cooperative. No apparent distress. HEENT:   Head- Normocephalic, atraumatic   Eyes- Conjunctivae pink, anicteric  Throat- Oral mucosa pink and moist  Neck-  No stridor, trachea midline, + JVD. No carotid bruit. CHEST: Chest symmetrical and non-tender to palpation. No accessory muscle use or intercostal retractions  RESPIRATORY: Lung sounds - clear throughout fields   CARDIOVASCULAR:     Heart Ausculation- IRRR no murmur heard.    PV: + tender shin. No lower extremity edema. No varicosities. Pedal pulses palpable, no clubbing or cyanosis   ABDOMEN: Soft, non-tender to light palpation. Bowel sounds present. No palpable masses; no abdominal bruit  MS: Good muscle strength and tone. No atrophy or abnormal movements. : Deferred  SKIN: Warm and dry no statis dermatitis or ulcers   NEURO / PSYCH: Oriented to person, place and time. Speech clear and appropriate. Follows all commands. Pleasant affect     DATA:    ECG AF CVR 84  Tele strips: AF -120's     Diagnostic:    None ordered    Intake/Output Summary (Last 24 hours) at 10/15/2021 1433  Last data filed at 10/15/2021 1244  Gross per 24 hour   Intake 750 ml   Output 10 ml   Net 740 ml       Labs:   CBC:   Recent Labs     10/15/21  0850   WBC 8.4   HGB 10.9*   HCT 34.0        BMP:   Recent Labs     10/15/21  0850      K 5.5*   CO2 28   BUN 25*   CREATININE 1.3*   LABGLOM 38   CALCIUM 9.8     LIVER PROFILE:  Recent Labs     10/15/21  0850   AST 25   ALT <5   LABALBU 3.7     ASSESSMENT:  1. AF RVR, known PAF. Denies any palpitations, SOB, or CP  2. [address] with Eliquis on hold since 10/12/021 for surgery  3. Mild MR/AI on TTE 8/2019 (EF 55-60%)  4. HTN  5. CKD  6. Fragility, non ambulatory since kyphoplasty in 8/2021  7. Parkinson's  8. Kyphoplasty in 6/2019 and again 8/2021  9. 8/5/2021 Left inguinal herniorrhaphy with BARD soft mesh    PLAN:  1. 50 mg PO Lopressor now then 25 mg PO BID (home dose)  2. Resume Eliquis when okay with surgery  3. Transfer to intermediate floor  4. Further recommendations to follow    Discussed with Dr Molly Beatty  Electronically signed by ELSA Villegas on 10/15/2021 at 2:33 PM

## 2021-10-15 NOTE — PROGRESS NOTES
Report called to [facility]. Patient family and transport aware of admission and bed placement. Consult to cardiology placed per anesthesia (Jacoby Jones and Dr Priyanka Fermin notified). Patient tele monitor applied, CMR visualizes, she is  A-Fib in the 90s.

## 2021-10-15 NOTE — INTERVAL H&P NOTE
Update History & Physical    The patient's History and Physical of September 29, 2021 was reviewed with the patient and I examined the patient. There was no change. The surgical site was confirmed by the patient and me. Plan: The risks, benefits, expected outcome, and alternative to the recommended procedure have been discussed with the patient. Patient understands and wants to proceed with the procedure.      Electronically signed by Nav Ochoa MD on 10/15/2021 at 11:21 AM

## 2021-10-16 NOTE — OP NOTE
Operative Note      Patient: Jonathan Cuenca  YOB: 1928  MRN: [id number]    Date of Procedure: 10/15/2021    Pre-Op Diagnosis: GALLSTONES    Post-Op Diagnosis: Same       Procedure(s):  LAPAROSCOPIC ROBOTIC ASSISTED CHOLECYSTECTOMY    Surgeon(s):  Lisbet Bailey MD    Assistant:   Resident: Cyndee Vasquez MD    Anesthesia: General    Estimated Blood Loss (mL): Minimal    Complications: None    Specimens:   ID Type Source Tests Collected by Time Destination   A : gallbladder Tissue Gallbladder SURGICAL PATHOLOGY Lisbet Bailey MD 10/15/2021 1214        Implants:  * No implants in log *      Drains: * No LDAs found *    Findings: cholelithiasis    Detailed Description of Procedure: The patient was brought to the operating room and positioned supine. SCDs were placed and functioning. The patient was given 2g IV ancef prior to incision. General anesthesia was obtained without complication as per the anesthesia record. Immediately prior to the procedure a time-out was called and the surgical checklist was reviewed and agreed upon by all present. The patient was prepped with Duraprep and draped in the usual fashion. A time out was called and agreed upon by all present.     A Veress needle was inserted at Kim's point and the abdomen was insufflated to 15 mmHg. The umbilicus was very high on the abdomen. An 8mm robotic trocar was inserted in the midline lower abdomen followed by the laparoscope. Three other 8mm trocars were inserted one in the left mid abdomen and two in the right lower abdomen under direct visualization. The robot was docked along the patient's right side. She was rotated to the left and the head of the bed was elevated. The gallbladder was identified and was normal with loose peritoneal attachments to the liver. The dome of the gallbladder was elevated over the liver and the infundibulum were retracted with Prograsps.  The cystic duct and cystic artery were isolated with dissection with hook and cautery. ICG confirmed anatomy with normal cystic duct, gallbladder filling, and flow through the CBD. The artery was clipped and divided. The cystic duct was clipped with two clips down and one clip up and then divided. The gallbladder was dissected off the gallbladder fossa with hook cautery. The gallbladder was placed in a laparoscopic bag and removed through the right most port. Skin incisions were closed with 4-0 vicryl and glue was applied. Dr. Haily Watt was present for the procedure. Disposition: The patient was awakened from anesthesia and transferred to PACU in stable condition. She will be admitted for overnight observation.     Electronically signed by Rawjinder Leyva MD on 10/16/2021 at 10:09 AM

## 2021-10-16 NOTE — PROGRESS NOTES
Progress note:    Overall, the patient states that she feels well. He complains of back pain. The patient is afebrile. The patient is normotensive. The patient has a normal pulse rate. Physical examination:    The abdomen is soft, nondistended with appropriate incisional tenderness. Laboratory studies: The patient potassium level slightly elevated at 5.2. Liver function studies are grossly normal.  The patient's white blood cell count is 11.1. Assessment:    Postoperative day 1 from a robotic assisted laparoscopic cholecystectomy. Plan:    The patient can be discharged to her extended care facility once cleared by cardiology.     Vianey Rojas MD.

## 2021-10-16 NOTE — PROGRESS NOTES
INPATIENT CARDIOLOGY FOLLOW-UP    Name: Yo Jimenes    Age: 80 y.o. Date of Admission: 10/15/2021  6:47 AM    Date of Service: 10/16/2021    Chief Complaint: Follow-up for AF with RVR    Interim History:  No new overnight cardiac complaints. Currently with no complaints of CP, SOB, palpitations, dizziness, or lightheadedness. She denies any more abdominal pain. She converted to NSR this morning. SR on telemetry. Review of Systems:   Cardiac: As per HPI  General: No fever, chills  Pulmonary: As per HPI  HEENT: No visual disturbances, difficult swallowing  GI: No nausea, vomiting  Endocrine: No thyroid disease or DM  Musculoskeletal: FROST x 4, no focal motor deficits  Skin: Intact, no rashes  Neuro/Psych: No headache or seizures    Problem List:  Patient Active Problem List   Diagnosis    Subtrochanteric fracture of right femur ([address]    Parkinson's disease ([address]    Renal insufficiency    Hypertension    Abdominal pain    Chronic bilateral thoracic back pain    Thoracic compression fracture, closed, initial encounter ([address]    SBO (small bowel obstruction) ([address]    Red blood cell antibody positive    Moderate protein-calorie malnutrition (HCC)    Compression fracture of L2 vertebra (HCC)    Lumbar compression fracture, closed, initial encounter ([address]    Paroxysmal atrial fibrillation (HCC)    Low back pain    Primary osteoarthritis of left knee    Back pain    H/O cholelithiasis    Atrial fibrillation with RVR (HCC)       Allergies:   Allergies   Allergen Reactions    Pcn [Penicillins] Itching       Current Medications:  Current Facility-Administered Medications   Medication Dose Route Frequency Provider Last Rate Last Admin    acetaminophen (TYLENOL) tablet 1,000 mg  1,000 mg Oral BID Yo Diaz MD   1,000 mg at 10/16/21 0903    carbidopa-levodopa (SINEMET)  MG per tablet 1 tablet  1 tablet Oral BID Yo Diaz MD   1 tablet at 10/16/21 0904    Vitamin D (CHOLECALCIFEROL) tablet 1,000 Units  1,000 Units Oral QAM Cyndee Vasquez MD   1,000 Units at 10/16/21 0904    docusate sodium (COLACE) capsule 100 mg  100 mg Oral BID PRN Cyndee Vasquez MD        ferrous sulfate (IRON 325) tablet 325 mg  325 mg Oral Daily with breakfast Cyndee Vasquez MD        furosemide (LASIX) tablet 20 mg  20 mg Oral BID Cyndee Vasquez MD   20 mg at 10/16/21 8455    spironolactone (ALDACTONE) tablet 25 mg  25 mg Oral Daily Cyndee Vasquez MD   25 mg at 10/16/21 0904    traMADol (ULTRAM) tablet 50 mg  50 mg Oral Q6H PRN Cyndee Vasquez MD        lactated ringers infusion   IntraVENous Continuous Cyndee Vasquez MD 50 mL/hr at 10/15/21 1839 New Bag at 10/15/21 1839    sodium chloride flush 0.9 % injection 5-40 mL  5-40 mL IntraVENous 2 times per day Cyndee Vasquez MD        sodium chloride flush 0.9 % injection 10 mL  10 mL IntraVENous PRN Cyndee Vasquez MD        0.9 % sodium chloride infusion  25 mL IntraVENous PRN Cyndee Vasquez MD        HYDROmorphone (DILAUDID) injection 0.25 mg  0.25 mg IntraVENous Q4H PRN Cyndee Vasquez MD        ondansetron [facility]) injection 4 mg  4 mg IntraVENous Q6H PRN Cyndee Vasquez MD        metoprolol tartrate (LOPRESSOR) tablet 25 mg  25 mg Oral BID Shannan Olivares. Ginder, APN   25 mg at 10/16/21 0904      lactated ringers 50 mL/hr at 10/15/21 1839    sodium chloride         Physical Exam:  BP (!) 129/51   Pulse 72   Temp 98.2 °F (36.8 °C) (Oral)   Resp 16   Ht 5' (1.524 m)   Wt 96 lb (43.5 kg)   SpO2 98%   BMI 18.75 kg/m²   Wt Readings from Last 3 Encounters:   10/15/21 96 lb (43.5 kg)   09/02/21 100 lb (45.4 kg)   06/03/21 100 lb 1.4 oz (45.4 kg)     Appearance: Awake, alert, no acute respiratory distress  Skin: Intact, no rash  Head: Normocephalic, atraumatic  Eyes: EOMI, no conjunctival erythema  ENMT: No pharyngeal erythema, MMM, no rhinorrhea  Neck: Supple, no elevated JVP, no carotid bruits  Lungs: Clear to auscultation bilaterally.  No wheezes, rales, or rhonchi. Cardiac: Regular rate and rhythm, +S1S2, MSM apparent over the apex  Abdomen: Soft, nontender, +bowel sounds  Extremities: Moves all extremities x 4, no lower extremity edema  Neurologic: No focal motor deficits apparent, normal mood and affect  Peripheral Pulses: Intact posterior tibial pulses bilaterally    Intake/Output:    Intake/Output Summary (Last 24 hours) at 10/16/2021 0910  Last data filed at 10/15/2021 1244  Gross per 24 hour   Intake 750 ml   Output 10 ml   Net 740 ml     No intake/output data recorded. Laboratory Tests:  Recent Labs     10/15/21  0850 10/16/21  0550    136   K 5.5* 5.2*   CL 95* 100   CO2 28 23   BUN 25* 32*   CREATININE 1.3* 1.5*   GLUCOSE 109* 126*   CALCIUM 9.8 9.3     Lab Results   Component Value Date    MG 2.3 10/15/2021     Recent Labs     10/15/21  0850 10/16/21  0550   ALKPHOS 138* 123*   ALT <5 17   AST 25 45*   PROT 7.8 7.1   BILITOT 0.8 0.7   BILIDIR 0.2  --    LABALBU 3.7 3.5     Recent Labs     10/15/21  0850 10/16/21  0550   WBC 8.4 11.1   RBC 3.29* 2.90*   HGB 10.9* 9.6*   HCT 34.0 29.8*   .3* 102.8*   MCH 33.1 33.1   MCHC 32.1 32.2   RDW 13.6 13.6    238   MPV 10.9 10.7     Lab Results   Component Value Date    TROPONINI 0.02 08/31/2020    TROPONINI <0.01 01/04/2017     Lab Results   Component Value Date    INR 1.6 07/19/2021    INR 9.7 (HH) 07/16/2021    INR 8.6 (HH) 07/16/2021    PROTIME 16.9 (H) 07/19/2021    PROTIME 104.4 (H) 07/16/2021    PROTIME 94.0 (H) 07/16/2021     Lab Results   Component Value Date    TSH 0.852 10/15/2021     No results found for: LABA1C  No results found for: EAG  No results found for: CHOL  No results found for: TRIG  No results found for: HDL  No results found for: LDLCALC, LDLCHOLESTEROL  No results found for: LABVLDL, VLDL  No results found for: CHOLHDLRATIO    Cardiac Tests:  ECG:AF with CVR, abnormal EKG.      Telemetry findings reviewed:  NSR with intermittent episodes of AF with  RVR      8/2/2019 TTE: no

## 2021-10-17 NOTE — PROGRESS NOTES
1,000 mg at 10/17/21 0800    carbidopa-levodopa (SINEMET)  MG per tablet 1 tablet  1 tablet Oral BID Franco Rodriguez MD   1 tablet at 10/17/21 0800    Vitamin D (CHOLECALCIFEROL) tablet 1,000 Units  1,000 Units Oral QAM Franco Rodriguez MD   1,000 Units at 10/17/21 0800    docusate sodium (COLACE) capsule 100 mg  100 mg Oral BID PRN Franco Rodriguez MD   100 mg at 10/17/21 0800    ferrous sulfate (IRON 325) tablet 325 mg  325 mg Oral Daily with breakfast Franco Rodriguez MD   325 mg at 10/17/21 0800    spironolactone (ALDACTONE) tablet 25 mg  25 mg Oral Daily Aman Armendariz MD   25 mg at 10/16/21 0904    traMADol (ULTRAM) tablet 50 mg  50 mg Oral Q6H PRN Franco Rodriguez MD   50 mg at 10/16/21 2025    sodium chloride flush 0.9 % injection 5-40 mL  5-40 mL IntraVENous 2 times per day Franco Rodriguez MD   10 mL at 10/17/21 0801    sodium chloride flush 0.9 % injection 10 mL  10 mL IntraVENous PRN Franco Rodriguez MD        0.9 % sodium chloride infusion  25 mL IntraVENous PRN Franco Rodriguez MD        HYDROmorphone (DILAUDID) injection 0.25 mg  0.25 mg IntraVENous Q4H PRN Franco Rodriguez MD        ondansetron [facility]) injection 4 mg  4 mg IntraVENous Q6H PRN Franco Rodriguez MD          sodium chloride         Physical Exam:  BP (!) 136/94   Pulse 99   Temp 98 °F (36.7 °C) (Oral)   Resp 16   Ht 5' (1.524 m)   Wt 96 lb (43.5 kg)   SpO2 98%   BMI 18.75 kg/m²   Wt Readings from Last 3 Encounters:   10/15/21 96 lb (43.5 kg)   09/02/21 100 lb (45.4 kg)   06/03/21 100 lb 1.4 oz (45.4 kg)     Appearance: Awake, alert, no acute respiratory distress  Skin: Intact, no rash  Head: Normocephalic, atraumatic  Eyes: EOMI, no conjunctival erythema  ENMT: No pharyngeal erythema, MMM, no rhinorrhea  Neck: Supple, no elevated JVP, no carotid bruits  Lungs: Clear to auscultation bilaterally. No wheezes, rales, or rhonchi.   Cardiac: Irregularly irregular rate and rhythm, tachycardic +S1S2, MSM apparent over the apex  Abdomen: Soft, nontender, +bowel sounds  Extremities: Moves all extremities x 4, no lower extremity edema  Neurologic: No focal motor deficits apparent, normal mood and affect  Peripheral Pulses: Intact posterior tibial pulses bilaterally    Intake/Output:    Intake/Output Summary (Last 24 hours) at 10/17/2021 0830  Last data filed at 10/17/2021 0424  Gross per 24 hour   Intake --   Output 300 ml   Net -300 ml     No intake/output data recorded. Laboratory Tests:  Recent Labs     10/15/21  0850 10/16/21  0550 10/17/21  0400    136 137   K 5.5* 5.2* 4.6   CL 95* 100 102   CO2 28 23 25   BUN 25* 32* 35*   CREATININE 1.3* 1.5* 1.6*   GLUCOSE 109* 126* 87   CALCIUM 9.8 9.3 9.1     Lab Results   Component Value Date    MG 2.3 10/15/2021     Recent Labs     10/15/21  0850 10/16/21  0550 10/17/21  0400   ALKPHOS 138* 123* 112*   ALT <5 17 <5   AST 25 45* 34*   PROT 7.8 7.1 6.7   BILITOT 0.8 0.7 0.5   BILIDIR 0.2  --   --    LABALBU 3.7 3.5 3.4*     Recent Labs     10/15/21  0850 10/16/21  0550 10/17/21  0400   WBC 8.4 11.1 7.5   RBC 3.29* 2.90* 2.69*   HGB 10.9* 9.6* 8.8*   HCT 34.0 29.8* 27.7*   .3* 102.8* 103.0*   MCH 33.1 33.1 32.7   MCHC 32.1 32.2 31.8*   RDW 13.6 13.6 13.9    238 203   MPV 10.9 10.7 10.5     Lab Results   Component Value Date    TROPONINI 0.02 08/31/2020    TROPONINI <0.01 01/04/2017     Lab Results   Component Value Date    INR 1.6 07/19/2021    INR 9.7 (HH) 07/16/2021    INR 8.6 (HH) 07/16/2021    PROTIME 16.9 (H) 07/19/2021    PROTIME 104.4 (H) 07/16/2021    PROTIME 94.0 (H) 07/16/2021     Lab Results   Component Value Date    TSH 0.852 10/15/2021     No results found for: LABA1C  No results found for: EAG  No results found for: CHOL  No results found for: TRIG  No results found for: HDL  No results found for: LDLCALC, LDLCHOLESTEROL  No results found for: LABVLDL, VLDL  No results found for: CHOLHDLRATIO    Cardiac Tests:  ECG:AF with CVR, abnormal EKG.      Telemetry findings

## 2021-10-17 NOTE — PROGRESS NOTES
Progress note:    Overall, the patient is doing well. She states that her pain is improved. She is having breakfast.  She is eating her pancakes. T-max is 98.1  Normotensive normal pulse rate  Hypertensive    Laboratory values:  BUN and creatinine at 35 and 1.6  Liver function studies are grossly normal.  Hemoglobin 8.8 hematocrit of 27.7  White blood cell count of 7.5    Physical examination:    The abdomen is soft, nondistended with mild epigastric tenderness without guarding or rebound. The incisions are well approximated. Assessment:    Status post robotic assisted laparoscopic cholecystectomy postoperative day 2  Intermittent atrial fibrillation    Plan:    Discharge when cleared by cardiology.     Geetha Desai MD

## 2021-10-18 NOTE — DISCHARGE INSTR - COC
Continuity of Care Form    Patient Name: Dayna Lopez   :  1928  MRN:  [id number]    Admit date:  10/15/2021  Discharge date:  10/19/21    Code Status Order: Full Code   Advance Directives:     Admitting Physician:  Lacy Landaverde MD  PCP: Fred Carpenter MD    Discharging Nurse: [address] [place] [address]  Unit/Room#: 3651/5398-L  Discharging Unit Phone Number: [phone]    Emergency Contact:   Extended Emergency Contact Information  Primary Emergency Contact: Farshad Ghotra  Address: [address]           [place], [address]  Phone: [phone]  Mobile Phone: [phone]  Relation: Child  Secondary Emergency Contact: [address]  Phone: [phone]  Mobile Phone: [phone]  Relation: Child  Preferred language: English   needed? No    Past Surgical History:  Past Surgical History:   Procedure Laterality Date    CATARACT REMOVAL WITH IMPLANT Left 2016    CHOLECYSTECTOMY, LAPAROSCOPIC N/A 10/15/2021    LAPAROSCOPIC ROBOTIC ASSISTED CHOLECYSTECTOMY performed by Lacy Landaverde MD at [facility] Left 2019    LEFT INGUINAL HERNIA REPAIR performed by Ramya Sweeney MD at [address] [facility] [address] Right 2015    ORIF left hip inter & subtrochanteric fx. w/long gamma nail. Alexei Alcala MD    HYSTERECTOMY  ? robotic    KYPHOSIS SURGERY  2017    T8    KYPHOSIS SURGERY      KYPHOSIS SURGERY N/A 6/10/2019    T11 VERTEBRAL BODY BIOPSY & KYPHOPLASTY performed by Gera Cruz MD at [address] [place] [address] 2019    KYPHOPLASTY T11, L2 performed by Gera Cruz MD at [facility] 2019    POSSIBLE LAPAROTOMY  POSSIBLE BOWEL RESECTION performed by Ramya Sweeney MD at [address] Right 1997    Right TKA. Sherlyn Rainey.  Dave Mendosa MD       Immunization History:   Immunization History   Administered Date(s) BM: 10/19/21    No intake or output data in the 24 hours ending 10/18/21 0911  No intake/output data recorded. Safety Concerns: At Risk for Falls    Impairments/Disabilities:      Magan Miller WILIAN Impairments/Disabilities:[id number]}    Nutrition Therapy:  Current Nutrition Therapy:   - Oral Diet:  General and Low Fat    Routes of Feeding: Oral  Liquids: Thin Liquids  Daily Fluid Restriction: no  Last Modified Barium Swallow with Video (Video Swallowing Test): not done    Treatments at the Time of Hospital Discharge:   Respiratory Treatments: ***  Oxygen Therapy:  is not on home oxygen therapy.   Ventilator:    - No ventilator support    Rehab Therapies: Physical Therapy and Occupational Therapy  Weight Bearing Status/Restrictions: No weight bearing restirctions  Other Medical Equipment (for information only, NOT a DME order):  {EQUIPMENT:[id number]}  Other Treatments: ***    Patient's personal belongings (please select all that are sent with patient):  Glasses, Dentures upper    RN SIGNATURE:  Enma Landrum RN    CASE MANAGEMENT/SOCIAL WORK SECTION    Inpatient Status Date: 10/15/2021    Readmission Risk Assessment Score:  Readmission Risk              Risk of Unplanned Readmission:  15           Discharging to Facility/ Agency   · Name:  [facility]  · [address] [place] [id number]  · Phone: [phone]  · Fax: [phone]    Dialysis Facility (if applicable)   · Name:  · Address:  · Dialysis Schedule:  · Phone:  · Fax:    / signature: Electronically signed by Jian Gamble RN on 10/18/2021 at 9:12 AM      PHYSICIAN SECTION    Prognosis: Good    Condition at Discharge: Stable    Rehab Potential (if transferring to Rehab): Good    Recommended Labs or Other Treatments After Discharge: ***    Physician Certification: I certify the above information and transfer of Lul Alcaraz  is necessary for the continuing treatment of the diagnosis listed and that she requires [facility] for less 30 days.      Update Admission H&P: {CHP DME Changes in HPV}    PHYSICIAN SIGNATURE:  {Esignature:[id number]}

## 2021-10-18 NOTE — PROGRESS NOTES
Physical Therapy    Facility/Department: [address] INTERNAL MEDICINE 2  Initial Assessment    NAME: Sofía Martinez  : 1928  MRN: [id number]    Date of Service: 10/18/2021      Patient Diagnosis(es): There were no encounter diagnoses. has a past medical history of Anemia, Cholelithiasis, Decreased ambulation status, Gastric reflux, Hypertension, PAF (paroxysmal atrial fibrillation) ([facility] Parkinson disease ([facility] and Vitamin D deficiency. has a past surgical history that includes Hip fracture surgery (Right, 2015); Hysterectomy (?); Tonsillectomy; Cataract removal with implant (Left, 2016); Total knee arthroplasty (Right, 1997); Kyphosis surgery (2017); Kyphosis surgery; Kyphosis surgery (N/A, 6/10/2019); hernia repair (Left, 2019); LAPAROTOMY EXPLORATORY (N/A, 2019); Kyphosis surgery (N/A, 2019); and Cholecystectomy, laparoscopic (N/A, 10/15/2021). Evaluating Therapist: Ashley Nicholson PT    Room #:  0683/7441-F  Diagnosis:  H/O cholelithiasis [Z87.19]  PMHx/PSHx:  Parkinson's. Procedure/Surgery:  Lap caleb 10/15  Precautions:  Falls      Social:  Pt admitted form SNF. States was recently using w/c more due to not feeling well but has been using wheeled walker     Initial Evaluation  Date: 10/18/21 Treatment      Short Term/ Long Term   Goals   Was pt agreeable to Eval/treatment? yes     Does pt have pain?  Back pain     Bed Mobility  Rolling: mod assist  Supine to sit: mod assist  Sit to supine: mod assist  Scooting: mod assist  Min assist   Transfers Sit to stand: mod assist  Stand to sit: mod assist  Stand pivot: mod assist  Min assist   Ambulation    5 feet with ww with mod assist  25 feet with ww with min assist   Stair Negotiation  Ascended and descended  NT      LE strength     3+/5    4-/5   balance      Fair-     AM-PAC Raw score                        Pt is alert and Oriented   LE ROM: WFL  Sensation: intact  Edema: none  Endurance: fair- appropriate assistive device  [] Stair Training in preparation for safe discharge home and/or into the community   [] Positioning to prevent skin breakdown and contractures  [x] Safety and Education Training   [] Patient/Caregiver Education   [] HEP  [] Other     PT long term treatment goals are located in above grid    Frequency of treatments: 2-5x/week x 5 days. Time in  0905  Time out  0930    Total Treatment Time  10 minutes     Evaluation Time includes thorough review of current medical information, gathering information on past medical history/social history and prior level of function, completion of standardized testing/informal observation of tasks, assessment of data and education on plan of care and goals.       CPT codes:  [x] Low Complexity PT evaluation 18495  [] Moderate Complexity PT evaluation 95695  [] High Complexity PT evaluation 71995  [] PT Re-evaluation 17895  [] Gait training 76534 minutes  [] Manual therapy 10321 minutes  [x] Therapeutic activities 76245 minutes  [] Therapeutic exercises 87596 minutes  [] Neuromuscular reeducation 64187 minutes     [facility] PSYCHIATRY PT [id number]

## 2021-10-18 NOTE — CARE COORDINATION
COVID - 10/5. POD # 3 lap caleb. Afib rvr- cardio following- discharge held today. From [address] snf PTA- private pay bedhold- will need PT/OT evals- no precert required. Per patient and son Lily Harrison [phone], plan is to return to [address] on discharge. Will need negative COVID testing on day of discharge per facility. N-17 in epic, ambulette transport form on chart.  Will follow Bari Potts RN case manager

## 2021-10-18 NOTE — PROGRESS NOTES
GENERAL SURGERY  DAILY PROGRESS NOTE  10/18/2021  No chief complaint on file. cc: s/p robotic assisted laparoscopic cholecystectomy 10/15/21    Subjective:  Patient resting comfortably in her bed. No acute events overnight. She is tolerating a regular diet without nausea or vomiting. She has not had a bowel movement. She denies nausea and vomiting. Objective:  BP (!) 118/55   Pulse 110   Temp 97.9 °F (36.6 °C) (Oral)   Resp 16   Ht 5' (1.524 m)   Wt 96 lb (43.5 kg)   SpO2 97%   BMI 18.75 kg/m²     General appearance: alert, cooperative and in no acute distress. Eyes: grossly normal  Lungs: nonlabored breathing on room air  Heart: regular rate  Abdomen: soft, appropriately tender, non distended, incisions C/D/I  Skin: No skin abnormalities  Neurologic: Alert and oriented x 3. Grossly normal  Musculoskeletal: No clubbing cyanosis or edema    Assessment/Plan:  80 y.o. female s/p robotic assisted laparoscopic cholecystectomy 10/15/21    Ok for discharge pending cardiology clearance  Ok for low fat diet as tolerated  Encourage ambulation  Pain/nausea control prn. Patient findings and plan discussed with Dr. Gustavo Martino. Electronically signed by Good Perez MD on 10/18/2021 at 10:12 AM`    The patient was seen and examined in the chart was reviewed. The patient states that she feels that her. On examination: The abdomen is less tender and nondistended. Incisions are well approximated. The patient is being seen by cardiology for her atrial fibrillation. MiraLAX and stool softener for constipation.

## 2021-10-18 NOTE — PROGRESS NOTES
Occupational Therapy  OCCUPATIONAL THERAPY INITIAL EVALUATION      Date:10/18/2021  Patient Name: Kia Gomez  MRN: [id number]  : 1928  Room: [facility]9594-N    OCCUPATIONAL THERAPY INITIAL EVALUATION    [address]         WNOW:                                                  Patient Name: Kia Gomez    MRN: [id number]    : 1928    Room: [facility]4670I      Evaluating OT: Joshua Shine OTR/L   [id number]      Referring Jose Antonio Magana MD    Specific Provider Orders/Date:OT eval and treat 10/18/2021      Diagnosis:  H/O cholelithiasis [Z87.19]   s/p robotic assisted laparoscopic cholecystectomy 10/15/21    Pertinent Medical History: Afib, HTN, parkinson disease     Precautions:  Fall Risk, alarm      Assessment of current deficits    [x] Functional mobility  [x]ADLs  [x] Strength               []Cognition    [x] Functional transfers   [x] IADLs         [x] Safety Awareness   [x]Endurance    [] Fine Coordination              [x] Balance      [] Vision/perception   []Sensation     []Gross Motor Coordination  [] ROM  [] Delirium                   [] Motor Control     OT PLAN OF CARE   OT POC based on physician orders, patient diagnosis and results of clinical assessment    Frequency/Duration  2-4 days/wk for 2 weeks PRN   Specific OT Treatment Interventions to include:   ADL retraining/adapted techniques and AE recommendations to increase functional independence within precautions                    Energy conservation techniques to improve tolerance for selfcare routine   Functional transfer/mobility training/DME recommendations for increased independence, safety and fall prevention         Patient/family education to increase safety and functional independence             Environmental modifications for safe mobility and completion of ADLs                             Therapeutic activity to improve functional performance during ADLs. Therapeutic exercise to improve tolerance and functional strength for ADLs    Balance retraining/tolerance tasks for facilitation of postural control with dynamic challenges during ADLs . Positioning to improve functional independence  []  Recommended Adaptive Equipment: TBD     SOCIAL:  Patient from SNF, assist with ADLs, w.c for mobility     Pain Level: back pain ;   Cognition: A&O: pleasant, conversing and following commands   Memory:  Fair +   Sequencing:  Fair +   Problem solving:  Fair    Judgement/safety:  Fair      Functional Assessment:  AM-PAC Daily Activity Raw Score: 14/24   Initial Eval Status  Date: 10/18 /21 Treatment Status  Date: STGs = LTGs  Time frame: 10-14 days   Feeding Set-up   Mod I    Grooming Yojana ,seated   Decrease standing   Supervision    UB Dressing Yojana   Supervision    LB Dressing Max A  Assist with socks and don brief    Min A    Bathing Mod a   Min A   Toileting Assist with hygiene   Min A   Bed Mobility  Mod A   Supine <> sit   Min A   Functional Transfers Mod A  Sit- stand from bed, chair   Min A   Functional Mobility Mod A,w/walker   SPT  Bed<> chair   Assist with balance and walker management   Min/CGA  with good tolerance    Balance Sitting:     Static:  Mod A- EOB -increase back pain     Dynamic:Max A   Standing: Mod A   SBA - sitting  Min A  Standing    Activity Tolerance Fair - with light activity   Back pain limiting   Good  with ADL activity    Visual/  Perceptual Glasses: none by bedside                 Hand Dominance right    AROM (PROM) Strength Additional Info:    RUE  WFL WFL good  and wfl FMC/dexterity noted during ADL tasks       LUE WFL WFL good  and wfl FMC/dexterity noted during ADL tasks       Hearing: [place]  Sensation:  No c/o numbness or tingling   Tone: WFL   Edema: none observed     Comments: Upon arrival patient lying in bed .   At end of session, patient returned to bed with call light and phone within reach, all lines and tubes intact. *ALARM On      Overall patient demonstrated  decreased independence and safety during completion of ADL/functional transfer/mobility tasks. Pt would benefit from continued skilled OT to increase safety and independence with completion of ADL/IADL tasks for functional independence and quality of life. Rehab Potential: fair +for established goals     Patient / Family Goal: none stated       Patient and/or family were instructed on functional diagnosis, prognosis/goals and OT plan of care. Demonstrated fair + understanding. Eval Complexity: Low    Time In: 0907  Time Out: 0929      Min Units   OT Eval Low 97165 x  1   OT Eval Medium 09970      OT Eval High 36669      OT Re-Eval [id number]       Therapeutic Ex [phone]       Therapeutic Activities 14537       ADL/Self Care 61416       Orthotic Management 43942       Manual [id number]     Neuro Re-Ed 81579       Non-Billable Time          Evaluation Time additionally includes thorough review of current medical information, gathering information on past medical history/social history and prior level of function, interpretation of standardized testing/informal observation of tasks, assessment of data and development of plan of care and goals.             Dione Mederos  OTR/L  OT [id number]

## 2021-10-18 NOTE — PROGRESS NOTES
INPATIENT CARDIOLOGY FOLLOW-UP    Name: Trudy Hall    Age: 80 y.o. Date of Admission: 10/15/2021  6:47 AM    Date of Service: 10/18/2021    Chief Complaint: Follow-up for AF with RVR    Interim History:  No new overnight cardiac complaints. Currently with no complaints of CP, SOB, palpitations, dizziness, or lightheadedness. She denies any more abdominal pain but feeling bloated, still no BM but passed some flatus. She is back in AF this morning. AF on telemetry. Review of Systems:   Cardiac: As per HPI  General: No fever, chills  Pulmonary: As per HPI  HEENT: No visual disturbances, difficult swallowing  GI: No nausea, vomiting  Endocrine: No thyroid disease or DM  Musculoskeletal: FROST x 4, no focal motor deficits  Skin: Intact, no rashes  Neuro/Psych: No headache or seizures    Problem List:  Patient Active Problem List   Diagnosis    Subtrochanteric fracture of right femur ([address]    Parkinson's disease ([address]    Renal insufficiency    Hypertension    Abdominal pain    Chronic bilateral thoracic back pain    Thoracic compression fracture, closed, initial encounter ([address]    SBO (small bowel obstruction) ([address]    Red blood cell antibody positive    Moderate protein-calorie malnutrition (HCC)    Compression fracture of L2 vertebra (HCC)    Lumbar compression fracture, closed, initial encounter ([address]    Paroxysmal atrial fibrillation (HCC)    Low back pain    Primary osteoarthritis of left knee    Back pain    H/O cholelithiasis    Atrial fibrillation with RVR (HCC)       Allergies:   Allergies   Allergen Reactions    Pcn [Penicillins] Itching       Current Medications:  Current Facility-Administered Medications   Medication Dose Route Frequency Provider Last Rate Last Admin    metoprolol tartrate (LOPRESSOR) tablet 50 mg  50 mg Oral BID Nick Mccarthy MD   50 mg at 10/18/21 0814    apixaban (ELIQUIS) tablet 2.5 mg  2.5 mg Oral BID Nick Mccarthy MD   2.5 mg at 10/18/21 0813    [Held by provider] furosemide (LASIX) tablet 20 mg  20 mg Oral Daily Jaye Bradshaw MD   20 mg at 10/17/21 0800    acetaminophen (TYLENOL) tablet 1,000 mg  1,000 mg Oral BID Maxine Siddiqui MD   1,000 mg at 10/18/21 6927    carbidopa-levodopa (SINEMET)  MG per tablet 1 tablet  1 tablet Oral BID Maxine Siddiqui MD   1 tablet at 10/18/21 0813    Vitamin D (CHOLECALCIFEROL) tablet 1,000 Units  1,000 Units Oral QAM Maxine Siddiqui MD   1,000 Units at 10/18/21 0813    docusate sodium (COLACE) capsule 100 mg  100 mg Oral BID PRN Maxine Siddiqui MD   100 mg at 10/18/21 7069    ferrous sulfate (IRON 325) tablet 325 mg  325 mg Oral Daily with breakfast Maxine Siddiqui MD   325 mg at 10/18/21 0813    spironolactone (ALDACTONE) tablet 25 mg  25 mg Oral Daily Jaye Bradshaw MD   25 mg at 10/18/21 0813    traMADol (ULTRAM) tablet 50 mg  50 mg Oral Q6H PRN Maxine Siddiqui MD   50 mg at 10/16/21 2025    sodium chloride flush 0.9 % injection 5-40 mL  5-40 mL IntraVENous 2 times per day Maxine Siddiqui MD   10 mL at 10/18/21 0814    sodium chloride flush 0.9 % injection 10 mL  10 mL IntraVENous PRN Maxine Siddiqui MD        0.9 % sodium chloride infusion  25 mL IntraVENous PRN Maxine Siddiqui MD        ondansetron [facility]) injection 4 mg  4 mg IntraVENous Q6H PRN Maxine Siddiqui MD          sodium chloride         Physical Exam:  BP (!) 118/55   Pulse 110   Temp 97.9 °F (36.6 °C) (Oral)   Resp 16   Ht 5' (1.524 m)   Wt 96 lb (43.5 kg)   SpO2 97%   BMI 18.75 kg/m²   Wt Readings from Last 3 Encounters:   10/15/21 96 lb (43.5 kg)   09/02/21 100 lb (45.4 kg)   06/03/21 100 lb 1.4 oz (45.4 kg)     Appearance: Awake, alert, no acute respiratory distress  Skin: Intact, no rash  Head: Normocephalic, atraumatic  Eyes: EOMI, no conjunctival erythema  ENMT: No pharyngeal erythema, MMM, no rhinorrhea  Neck: Supple, no elevated JVP, no carotid bruits  Lungs: Clear to auscultation bilaterally.  No wheezes, rales, or RVR heart rate in the 100s    Labs and vitals were reviewed: as above.,      8/2/2019 TTE: EF 55-60%. Mild CLVH. Moderately dilated LA, AF. Mild MR/AI    Stress test:        Cardiac catheterization:         ASSESSMENT:  1. AF RVR, known PAF. Denies any palpitations, SOB, or CP>>NSR  With episodes of AF with RVR, she is back in AF with RVR, rate controlled. 2. [address] with Eliquis on hold since 10/12/021 for surgery  3. Mild MR/AI on TTE 8/2019 (EF 55-60%)  4. HTN, well controlled 118/55  5. CKD creat 1.3>>1.5>>1.6>>1.2  6. Fragility, non ambulatory since kyphoplasty in 8/2021  7. Parkinson's  8. Kyphoplasty in 6/2019 and again 8/2021  9. 8/5/2021 Left inguinal herniorrhaphy with BARD soft mesh  10. Moderate anemia 10.9>>9.6>>8.8>>10.7  11. Constipation. Plan:   · Heart rate is reasonably controlled and continue metoprolol 50 mg po bid, hemodynamically stable  · Continue dose adjusted Eliquis 2.5 mg po twice a day and Hb stable and in fact improved from yesterday. · Hold lasix to 20 mg po daily due to bump in creat and decreased oral intake, use lasix as needed. · Hold Aldactone due to bump in creatinine level and soft BP  · Rest as per primary service  · Monitor BMP  · She is stable from the cardiology standpoint,  · Post op constipation, management as per surgical service. Nicole Jamison MD., Javon Ames.   [facility]) Cardiology

## 2021-10-19 NOTE — PROGRESS NOTES
INPATIENT CARDIOLOGY FOLLOW-UP    Name: Gio Martínez    Age: 80 y.o. Date of Admission: 10/15/2021  6:47 AM    Date of Service: 10/19/2021    Chief Complaint: Follow-up for AF with RVR    Interim History:  No new overnight cardiac complaints. Currently with no complaints of CP, SOB, palpitations, dizziness, or lightheadedness. She denies any more abdominal pain but feeling bloated, she told me that she had a small bowel movement yesterday. She is back in AF this morning. AF on telemetry. Review of Systems:   Cardiac: As per HPI  General: No fever, chills  Pulmonary: As per HPI  HEENT: No visual disturbances, difficult swallowing  GI: No nausea, vomiting  Endocrine: No thyroid disease or DM  Musculoskeletal: FROST x 4, no focal motor deficits  Skin: Intact, no rashes  Neuro/Psych: No headache or seizures    Problem List:  Patient Active Problem List   Diagnosis    Subtrochanteric fracture of right femur ([address]    Parkinson's disease ([address]    Renal insufficiency    Hypertension    Abdominal pain    Chronic bilateral thoracic back pain    Thoracic compression fracture, closed, initial encounter ([address]    SBO (small bowel obstruction) ([address]    Red blood cell antibody positive    Moderate protein-calorie malnutrition (HCC)    Compression fracture of L2 vertebra (HCC)    Lumbar compression fracture, closed, initial encounter ([address]    Paroxysmal atrial fibrillation (HCC)    Low back pain    Primary osteoarthritis of left knee    Back pain    H/O cholelithiasis    Atrial fibrillation with RVR (HCC)       Allergies:   Allergies   Allergen Reactions    Pcn [Penicillins] Itching       Current Medications:  Current Facility-Administered Medications   Medication Dose Route Frequency Provider Last Rate Last Admin    digoxin (LANOXIN) injection 250 mcg  250 mcg IntraVENous Once Jaye Bradshaw MD        digoxin [facility]) injection 250 mcg  250 mcg IntraVENous Once MD Brii Alonzo [facility] ON 10/20/2021] digoxin (LANOXIN) tablet 62.5 mcg  62.5 mcg Oral Daily Mingo Lesch, MD        furosemide (LASIX) tablet 20 mg  20 mg Oral Daily PRN Mingo Lesch, MD        polyethylene glycol [facility]) packet 17 g  17 g Oral Daily Lisbet Bailey MD   17 g at 10/18/21 1518    metoprolol tartrate (LOPRESSOR) tablet 50 mg  50 mg Oral BID Mingo Lesch, MD   50 mg at 10/19/21 0805    apixaban (ELIQUIS) tablet 2.5 mg  2.5 mg Oral BID Mingo Lesch, MD   2.5 mg at 10/19/21 0805    acetaminophen (TYLENOL) tablet 1,000 mg  1,000 mg Oral BID Cyndee Vasquez MD   1,000 mg at 10/19/21 0805    carbidopa-levodopa (SINEMET)  MG per tablet 1 tablet  1 tablet Oral BID Cyndee Vasquez MD   1 tablet at 10/19/21 0805    Vitamin D (CHOLECALCIFEROL) tablet 1,000 Units  1,000 Units Oral QAM Cyndee Vasquez MD   1,000 Units at 10/19/21 0805    docusate sodium (COLACE) capsule 100 mg  100 mg Oral BID PRN Cyndee Vasquez MD   100 mg at 10/19/21 0805    ferrous sulfate (IRON 325) tablet 325 mg  325 mg Oral Daily with breakfast Cyndee Vasquez MD   325 mg at 10/19/21 0805    spironolactone (ALDACTONE) tablet 25 mg  25 mg Oral Daily Mingo Lesch, MD   25 mg at 10/19/21 0805    traMADol (ULTRAM) tablet 50 mg  50 mg Oral Q6H PRN Cyndee Vasquez MD   50 mg at 10/16/21 2025    sodium chloride flush 0.9 % injection 5-40 mL  5-40 mL IntraVENous 2 times per day Cyndee Vasquez MD   10 mL at 10/19/21 0806    sodium chloride flush 0.9 % injection 10 mL  10 mL IntraVENous PRN Cyndee Vasquez MD        0.9 % sodium chloride infusion  25 mL IntraVENous PRN Cyndee Vasquez MD        ondansetron [facility]) injection 4 mg  4 mg IntraVENous Q6H PRN Cyndee Vasquez MD          sodium chloride         Physical Exam:  /68   Pulse 120   Temp 98.4 °F (36.9 °C) (Oral)   Resp 16   Ht 5' (1.524 m)   Wt 96 lb (43.5 kg)   SpO2 97%   BMI 18.75 kg/m²   Wt Readings from Last 3 Encounters:   10/15/21 96 lb (43.5 kg)   09/02/21 100 lb (45.4 kg)   06/03/21 100 lb 1.4 oz (45.4 kg)     Appearance: Awake, alert, no acute respiratory distress  Skin: Intact, no rash  Head: Normocephalic, atraumatic  Eyes: EOMI, no conjunctival erythema  ENMT: No pharyngeal erythema, MMM, no rhinorrhea  Neck: Supple, no elevated JVP, no carotid bruits  Lungs: Clear to auscultation bilaterally. No wheezes, rales, or rhonchi. Cardiac: Irregularly irregular rate and rhythm, tachycardic +S1S2, MSM apparent over the apex  Abdomen: Soft, nontender, +bowel sounds  Extremities: Moves all extremities x 4, no lower extremity edema  Neurologic: No focal motor deficits apparent, normal mood and affect  Peripheral Pulses: Intact posterior tibial pulses bilaterally    Intake/Output:    Intake/Output Summary (Last 24 hours) at 10/19/2021 0940  Last data filed at 10/18/2021 1621  Gross per 24 hour   Intake --   Output 500 ml   Net -500 ml     No intake/output data recorded.     Laboratory Tests:  Recent Labs     10/17/21  0400 10/18/21  0345    137   K 4.6 4.3    100   CO2 25 25   BUN 35* 28*   CREATININE 1.6* 1.2*   GLUCOSE 87 81   CALCIUM 9.1 9.2     Lab Results   Component Value Date    MG 2.3 10/15/2021     Recent Labs     10/17/21  0400   ALKPHOS 112*   ALT <5   AST 34*   PROT 6.7   BILITOT 0.5   LABALBU 3.4*     Recent Labs     10/17/21  0400 10/18/21  0345   WBC 7.5 8.2   RBC 2.69* 3.28*   HGB 8.8* 10.7*   HCT 27.7* 33.7*   .0* 102.7*   MCH 32.7 32.6   MCHC 31.8* 31.8*   RDW 13.9 13.8    230   MPV 10.5 10.5     Lab Results   Component Value Date    TROPONINI 0.02 08/31/2020    TROPONINI <0.01 01/04/2017     Lab Results   Component Value Date    INR 1.6 07/19/2021    INR 9.7 (HH) 07/16/2021    INR 8.6 (HH) 07/16/2021    PROTIME 16.9 (H) 07/19/2021    PROTIME 104.4 (H) 07/16/2021    PROTIME 94.0 (H) 07/16/2021     Lab Results   Component Value Date    TSH 0.852 10/15/2021     No results found for: LABA1C  No results found for: EAG  No results found for: CHOL  No results found for: TRIG  No results found for: HDL  No results found for: LDLCALC, LDLCHOLESTEROL  No results found for: LABVLDL, VLDL  No results found for: CHOLHDLRATIO    Cardiac Tests:  ECG:AF with CVR, abnormal EKG. Telemetry findings reviewed:  AF with intermittent episodes of AF with  RVR heart rate in the     Labs and vitals were reviewed: as above  No lab work for today    8/2/2019 TTE: EF 55-60%. Mild CLVH. Moderately dilated LA, AF. Mild MR/AI    Stress test:        Cardiac catheterization:         ASSESSMENT:  1. AF RVR, known PAF. Denies any palpitations, SOB, or CP>>NSR  With episodes of AF with RVR,  2. Secondary hypercoagulable state  3. [id number] of 4 on [address] [place] [address] Road with Eliquis on hold since 10/12/021 for surgery  4. Mild MR/AI on TTE 8/2019 (EF 55-60%)  5. HTN, well controlled 112/68, heart rate 120  6. CKD creat 1.3>>1.5>>1.6>>1.2  7. Fragility, non ambulatory since kyphoplasty in 8/2021  8. Parkinson's  9. Kyphoplasty in 6/2019 and again 8/2021  10. 8/5/2021 Left inguinal herniorrhaphy with BARD soft mesh  11. Moderate anemia 10.9>>9.6>>8.8>>10.7  12. Constipation. Plan:   · She still has episodes of tachycardia with underlying atrial fibrillation. We will continue current dose of metoprolol 50 mg p.o. twice daily. We will add digoxin to 50 mcg IV every 6 hours x2 and then 62.5 mg 1 p.o. daily starting from tomorrow. · Check BMP and CBC. · Continue dose adjusted Eliquis 2.5 mg po twice a day and Hb stable and in fact improved from yesterday. · Hold lasix to 20 mg po daily due to bump in creat and decreased oral intake, use lasix as needed. · We will discontinue Aldactone due to soft blood pressures and a bump in creatinine level. · Rest as per primary service  · Monitor BMP  · She is stable from the cardiology standpoint,  · Post op constipation, management as per surgical service. Sangeetha Andrews MD., Malaika Parmar.   [facility]) Cardiology

## 2021-10-19 NOTE — PROGRESS NOTES
GENERAL SURGERY  DAILY PROGRESS NOTE  10/19/2021  No chief complaint on file. cc: s/p robotic assisted laparoscopic cholecystectomy 10/15/21    Subjective:  Minimal pain today. Endorses flatus and small BM. Eating well without N/V. Objective:  BP 92/61   Pulse 82   Temp 97 °F (36.1 °C) (Oral)   Resp 18   Ht 5' (1.524 m)   Wt 96 lb (43.5 kg)   SpO2 98%   BMI 18.75 kg/m²     General appearance: alert, cooperative and in no acute distress. Eyes: grossly normal  Lungs: nonlabored breathing on room air  Heart: regular rate, hypotensive  Abdomen: soft, appropriately tender, non distended, incisions C/D/I  Skin: No skin abnormalities  Neurologic: Alert and oriented x 3. Grossly normal  Musculoskeletal: No clubbing cyanosis or edema    Assessment/Plan:  80 y.o. female s/p robotic assisted laparoscopic cholecystectomy 10/15/21    Ok for discharge pending cardiology clearance- hopefully today  Ok for low fat diet as tolerated  Encourage ambulation  Pain/nausea control prn. Patient findings and plan discussed with Dr. Ministerio Hayes.     Electronically signed by Franchesca Horton MD on 10/19/2021 at 4:46 AM`

## 2021-10-19 NOTE — CARE COORDINATION
COVID - 10/19. Discharge order noted. To discharge to [place] [id number] will pick pt up @ 5pm. Son Gilles Shirley [phone] notified of discharge time and destination. Nursing notified. N-17 in epic.  Sandhya Sullivan, RN case manager

## 2021-10-19 NOTE — DISCHARGE SUMMARY
Physician Discharge Summary     Patient ID:  Lul Alcaraz  [id number]  89 y.o.  7/8/1928    Admit date: 10/15/2021    Discharge date and time: No discharge date for patient encounter. Admitting Physician: Yue Nascimento MD     Admission Diagnoses: H/O cholelithiasis [Z87.19]    Discharge Diagnoses: Active Problems:    Paroxysmal atrial fibrillation (HCC)    H/O cholelithiasis    Atrial fibrillation with RVR ([address]  Resolved Problems:    * No resolved hospital problems. *      Admission Condition: poor    Discharged Condition: stable      Hospital Course:  Lul Alcaraz is a 80 y.o. female who underwent laparoscopic robotic assisted cholecystectomy on 10/15/21. She developed new onset Afib with RVR post-op. Cardiology was consulted for further evaluation. The patient's course was otherwise uneventful. She progressed well, pain was controlled on PO medications. She was tolerating a regular diet with no nausea or vomiting, and was in a suitable condition for discharge to skilled nursing facility in stable condition. Consults:   IP CONSULT TO CARDIOLOGY    Significant Diagnostic Studies:   No results found. Discharge Exam:  BP 92/61   Pulse 82   Temp 97 °F (36.1 °C) (Oral)   Resp 18   Ht 5' (1.524 m)   Wt 96 lb (43.5 kg)   SpO2 98%   BMI 18.75 kg/m²      General appearance: alert, cooperative and in no acute distress. Eyes: grossly normal  Lungs: nonlabored breathing on room air  Heart: regular rate, hypotensive  Abdomen: soft, appropriately tender, non distended, incisions C/D/I  Skin: No skin abnormalities  Neurologic: Alert and oriented x 3.  Grossly normal  Musculoskeletal: No clubbing cyanosis or edema    Disposition: SNF    In process/preliminary results:  Outstanding Order Results     Date and Time Order Name Status Description    10/15/2021 12:00 AM Surgical Pathology In process           Patient Instructions:   Current Discharge Medication List      CONTINUE these medications which have NOT CHANGED    Details   apixaban (ELIQUIS) 2.5 MG TABS tablet Take by mouth 2 times daily      ferrous sulfate (IRON 325) 325 (65 Fe) MG tablet Take 325 mg by mouth daily (with breakfast)      Multiple Vitamin (MULTIVITAMIN ADULT PO) Take 1 tablet by mouth daily       docusate sodium (COLACE, DULCOLAX) 100 MG CAPS Take 100 mg by mouth 2 times daily as needed for Constipation  Qty: 60 capsule, Refills: 1      spironolactone (ALDACTONE) 25 MG tablet Take 25 mg by mouth daily      acetaminophen (APAP EXTRA STRENGTH) 500 MG tablet Take 2 tablets by mouth 2 times daily  Qty: 120 tablet, Refills: 0      traMADol (ULTRAM) 50 MG tablet Take 50 mg by mouth every 6 hours as needed for Pain. furosemide (LASIX) 20 MG tablet Take 1 tablet by mouth 2 times daily  Qty: 60 tablet, Refills: 3      metoprolol tartrate (LOPRESSOR) 25 MG tablet Take 1 tablet by mouth 2 times daily  Qty: 60 tablet, Refills: 3      magnesium hydroxide (MILK OF MAGNESIA) 400 MG/5ML suspension Take by mouth daily as needed for Constipation      bisacodyl (DULCOLAX) 10 MG suppository Place 10 mg rectally daily as needed for Constipation      Artificial Tear Solution (SOOTHE XP) SOLN Place 1 drop into the left eye 3 times daily      Cholecalciferol (VITAMIN D3) 1000 UNITS TABS Take 1 tablet by mouth every morning       carbidopa-levodopa (SINEMET)  MG per tablet Take 1 tablet by mouth 2 times daily              Gallbladder Removal Surgery: What to Expect at [address] [place] [address]  After your surgery, you will likely feel weak and tired for several days after you return home. Your belly may be swollen. If you had laparoscopic surgery, you may also have pain in your shoulder for about 24 hours. You may have gas or need to burp a lot at first. A few people get diarrhea. The diarrhea usually goes away in 2 to 4 weeks, but it may last longer. How quickly you recover depends on whether you had a laparoscopic or open surgery.   · For a laparoscopic surgery, most people can go back to work or their normal routine in 1 to 2 weeks. But it may take longer, depending on the type of work you do. · For an open surgery, it will probably take 4 to 6 weeks before you get back to your normal routine. This care sheet gives you a general idea about how long it will take for you to recover. However, each person recovers at a different pace. Follow the steps below to get better as quickly as possible. How can you care for yourself at home? Activity    · Rest when you feel tired. Getting enough sleep will help you recover.     · Try to walk each day. Start out by walking a little more than you did the day before. Gradually increase the amount you walk. Walking boosts blood flow and helps prevent pneumonia and constipation.     · For about 2 to 4 weeks, avoid lifting anything that would make you strain. This may include a child, heavy grocery bags and milk containers, a heavy briefcase or backpack, cat litter or dog food bags, or a vacuum .     · Avoid strenuous activities, such as biking, jogging, weightlifting, and aerobic exercise, until your doctor says it is okay.     · You may shower 24 to 48 hours after surgery, if your doctor okays it. Pat the cut (incision) dry. Do not take a bath for the first 2 weeks, or until your doctor tells you it is okay.     · You may drive when you are no longer taking pain medicine and can quickly move your foot from the gas pedal to the brake. You must also be able to sit comfortably for a long period of time, even if you do not plan to go far. You might get caught in traffic.     · For a laparoscopic surgery, most people can go back to work or their normal routine in 1 to 2 weeks, but it may take longer. For an open surgery, it will probably take 4 to 6 weeks before you get back to your normal routine.     · Your doctor will tell you when you can have sex again.    Diet    · When you feel like eating, start with small amounts of food. Avoid eating fatty foods for about 1 month. Fatty foods include hamburger, whole milk, cheese, and many snack foods.     · Drink plenty of fluids (unless your doctor tells you not to).   · If you have diarrhea, watch to see if specific foods cause it, and stop eating them. If the diarrhea continues for more than 2 weeks, talk to your doctor.     · You may notice that your bowel movements are not regular right after your surgery. This is common. Try to avoid constipation and straining with bowel movements. You may want to take a fiber supplement every day. If you have not had a bowel movement after a couple of days, ask your doctor about taking a mild laxative. Medicines    · Your doctor will tell you if and when you can restart your medicines. He or she will also give you instructions about taking any new medicines.     · If you take aspirin or some other blood thinner, ask your doctor if and when to start taking it again. Make sure that you understand exactly what your doctor wants you to do.     · Take pain medicines exactly as directed. ? If the doctor gave you a prescription medicine for pain, take it as prescribed. ? If you are not taking a prescription pain medicine, take an over-the-counter medicine such as acetaminophen (Tylenol), ibuprofen (Advil, Motrin), or naproxen (Aleve). Read and follow all instructions on the label. ? Do not take two or more pain medicines at the same time unless the doctor told you to. Many pain medicines contain acetaminophen, which is Tylenol. Too much Tylenol can be harmful.     · If you think your pain medicine is making you sick to your stomach:  ? Take your medicine after meals (unless your doctor tells you not to). ? Ask your doctor for a different pain medicine.     · If your doctor prescribed antibiotics, take them as directed. Do not stop taking them just because you feel better. You need to take the full course of antibiotics.    Incision care    · If you more?  Go to [email or web address] and sign in to your [employer] account. Enter [phone] in the [facility] box to learn more about \"Gallbladder Removal Surgery: What to Expect at Home. \"     If you do not have an account, please click on the \"Sign Up Now\" link. Current as of: February 10, 2021               Content Version: 13.0  © 7957-7553 Healthwise, [employer]. Care instructions adapted under license by [facility] ([facility]). If you have questions about a medical condition or this instruction, always ask your healthcare professional. [place] [address] any warranty or liability for your use of this information.         Follow up:   [facility] [address] [place] [id number]    Schedule an appointment as soon as possible for a visit in 2 weeks  For wound re-check    CM [address]  [phone]           Signed:  Julia Nolen MD  10/19/2021  11:38 AM     Agree with the assessment and plan.

## 2021-10-20 NOTE — TELEPHONE ENCOUNTER
Susan with [facility] calling for HFU apt/timing with Dr. Castillo Rosenbaum dx: Afib - discharged on: 10/19/20. Prior [place] pt. Please call [phone] for an apt.

## 2021-10-26 NOTE — H&P
In addition to documented H&P from 9/29/2021 that was updated 10/15:    Past Medical History:   Diagnosis Date    Anemia     Cholelithiasis     Decreased ambulation status     Gastric reflux     Hypertension     PAF (paroxysmal atrial fibrillation) (HCC)     Parkinson disease ([facility]     Vitamin D deficiency      Past Surgical History:   Procedure Laterality Date    CATARACT REMOVAL WITH IMPLANT Left 12/06/2016    CHOLECYSTECTOMY, LAPAROSCOPIC N/A 10/15/2021    LAPAROSCOPIC ROBOTIC ASSISTED CHOLECYSTECTOMY performed by Silviano Key MD at [facility]. Left 8/5/2019    LEFT INGUINAL HERNIA REPAIR performed by Raji Shearer MD at [facility] Right 11/16/2015    ORIF left hip inter & subtrochanteric fx. w/long gamma nail. Bowen Toro MD    HYSTERECTOMY  2011? robotic    KYPHOSIS SURGERY  01/11/2017    T8    KYPHOSIS SURGERY      KYPHOSIS SURGERY N/A 6/10/2019    T11 VERTEBRAL BODY BIOPSY & KYPHOPLASTY performed by Jeanette Dunn MD at [facility] 8/8/2019    KYPHOPLASTY T11, L2 performed by Jeanette Dunn MD at [facility] 8/5/2019    POSSIBLE LAPAROTOMY  POSSIBLE BOWEL RESECTION performed by Raji Shearer MD at [address] Right 01/16/1997    Right TKA. Kaushik Valadez MD      REVIEW OF SYSTEMS:    Constitutional: negative  Eyes: negative  Ears, nose, mouth, throat, and face: negative  Respiratory: negative  Cardiovascular: negative  Gastrointestinal: positive for abdominal pain, otherwise negative  Genitourinary:negative  Integument/breast: negative  Hematologic/lymphatic: negative  Musculoskeletal:negative  Neurological: negative  Allergic/Immunologic: negative    /63   Pulse 94   Temp 97.8 °F (36.6 °C) (Oral)   Resp 16   Ht 5' (1.524 m)   Wt 96 lb (43.5 kg)   SpO2 97%   BMI 18.75 kg/m²    General appearance: alert, cooperative and in no acute distress. Eyes: grossly normal  Lungs: Nonlabored breathing on room air  Heart: regular rate and rhythm  Abdomen: soft, non-tender, non distended  Skin: No skin abnormalities  Neurologic: Alert and oriented x 3.  Grossly normal  Musculoskeletal: No clubbing or cyanosis     Electronically signed by Adriel Butt MD on 10/26/2021 at 2:40 PM

## 2021-10-28 NOTE — PROGRESS NOTES
Physician Progress Note      Ally Leger  [facility] #:                  [id number]  :                       1928  ADMIT DATE:       10/15/2021 6:47 AM  DISCH DATE:        10/19/2021 5:05 PM  RESPONDING  PROVIDER #:        Bryan Silva MD          QUERY TEXT:    Patient admitted with cholelithiasis, noted to have atrial fibrillation and is   maintained on Eliquis. If possible, please document in progress notes   and?discharge?summary if you are evaluating and/or treating any of the   following: The medical record reflects the following:?  ? Risk Factors: atrial fib  ? Clinical Indicators: [id number], per Cardiology \". .. AF RVR, known PAF. Denies any palpitations, SOB, or CP. [address] with Eliquis on hold since 10/12/021   for surgery. Susan Ortiz \"  Treatment: PO Eliquis    Thank you,  Heriberto GAONA, RN, CDIS  Clinical Documentation Improvement  [email or web address]  [phone]  Options provided:  -- Secondary hypercoagulable state in a patient with atrial fibrillation  -- Other - I will add my own diagnosis  -- Disagree - Not applicable / Not valid  -- Disagree - Clinically unable to determine / Unknown  -- Refer to Clinical Documentation Reviewer    PROVIDER RESPONSE TEXT:    This patient has secondary hypercoagulable state related to atrial   fibrillation.     Query created by: Cozette Holstein on 10/26/2021 8:30 AM      Electronically signed by:  Bryan Silva MD 10/28/2021 8:03 AM

## 2021-11-10 PROBLEM — I50.32 CHRONIC HEART FAILURE WITH PRESERVED EJECTION FRACTION (HCC): Status: ACTIVE | Noted: 2021-01-01

## 2021-11-10 NOTE — PROGRESS NOTES
CHIEF COMPLAINT:   Chief Complaint   Patient presents with    Follow-Up from [address] [facility] with RVR. Occasional lightheadedness upon rising accompanied by SOB. Had BLE edema but is improved today. HISTORY OF PRESENT ILLNESS: Patient is a 80 y.o. female who is here for a follow up visit with me. She has a history of prior paroxysmal atrial fibrillation, hypertension, hyperlipidemia, heart failure with preserved ejection fraction. She is here for posthospitalization visit. She was admitted to the hospital on 10/15/2021 for cholecystectomy. She was found to be in atrial fibrillation with RVR. Rate control medications were adjusted and the heart rates were better controlled. She was discharged on 10/19/2021. Did have some acute on chronic renal failure as well. Since discharge, she has been doing well. She has not had any further visits to the hospital or emergency room. She now resides in a nursing home. At today's office visit, She  denies any chest pain, shortness of breath, palpitations, dizziness, pedal edema. The patient is capable of activities of daily living. There is dyspnea on more than moderate exertion. There is no orthopnea or PND. She is compliant with medications    Prior Cardiac workup:  TTE 8/2/19:  TTE: EF 55-60%. Mild CLVH. Moderately dilated LA, AF.  Mild MR/AI      Past Medical History:   Diagnosis Date    Anemia     Cholelithiasis     Decreased ambulation status     Gastric reflux     Hypertension     PAF (paroxysmal atrial fibrillation) (HCC)     Parkinson disease (HCC)     Vitamin D deficiency        Allergies   Allergen Reactions    Pcn [Penicillins] Itching       Current Outpatient Medications   Medication Sig Dispense Refill    polyethylene glycol (GLYCOLAX) 17 g packet Take 17 g by mouth daily 527 g 1    digoxin 62.5 MCG TABS Take 62.5 mcg by mouth daily 30 tablet 3    apixaban (ELIQUIS) 2.5 MG TABS tablet Take by mouth 2 times daily Insecurity:     Worried About Running Out of Food in the Last Year: Not on file    Jailyn of Food in the Last Year: Not on file   Transportation Needs:     Lack of Transportation (Medical): Not on file    Lack of Transportation (Non-Medical): Not on file   Physical Activity:     Days of Exercise per Week: Not on file    Minutes of Exercise per Session: Not on file   Stress:     Feeling of Stress : Not on file   Social Connections:     Frequency of Communication with Friends and Family: Not on file    Frequency of Social Gatherings with Friends and Family: Not on file    Attends [employer] Services: Not on file    Active Member of [address] [place] [address] [employer] or Organizations: Not on file    Attends Club or Organization Meetings: Not on file    Marital Status: Not on file   Intimate Partner Violence:     Fear of Current or Ex-Partner: Not on file    Emotionally Abused: Not on file    Physically Abused: Not on file    Sexually Abused: Not on file   Housing Stability:     Unable to Pay for Housing in the Last Year: Not on file    Number of [place] in the Last Year: Not on file    Unstable Housing in the Last Year: Not on file       No family history on file. Review of Systems  Constitutional: Negative for fever, malaise/fatigue and weight loss. HENT: Negative for sore throat and tinnitus. Eyes: Negative for blurred vision and double vision. Respiratory: Negative for shortness of breath. Negative for cough and wheezing. Cardiovascular: As mentioned in HPI. Gastrointestinal: Negative for abdominal pain, heartburn, nausea and vomiting. Genitourinary: Negative. Musculoskeletal: Negative for back pain, joint pain and myalgias. Neurological: Negative for dizziness, tremors, loss of consciousness and headaches. Endo/Heme/Allergies: Negative. Psychiatric/Behavioral: Negative for depression and suicidal ideas.     Physical Exam   /60   Pulse 78   Resp 20   Ht 5' (1.524 m)   Wt 86 lb 6.4 oz (39.2 kg)   SpO2 97%   BMI 16.87 kg/m²   Constitutional: Oriented to person, place, and time. Well-developed and well-nourished. No distress. Head: Normocephalic and atraumatic. Eyes: EOM are normal. Pupils are equal, round, and reactive to light. Neck: Normal range of motion. Neck supple. No hepatojugular reflux and no JVD present. Carotid bruit is not present. No tracheal deviation present. No thyromegaly present. Cardiovascular: Normal rate, regular rhythm, normal heart sounds and intact distal pulses. Exam reveals no gallop and no friction rub. No murmur heard. Pulmonary/Chest: Effort normal and breath sounds normal. No respiratory distress. No wheezes. No rales. No tenderness. Abdominal: Soft. Bowel sounds are normal. No distension and no mass. No tenderness. No rebound and no guarding. Musculoskeletal: Normal range of motion. No edema and no tenderness. Lymphadenopathy:   No cervical adenopathy. Neurological: Alert and oriented to person, place, and time. Skin: Skin is warm and dry. No rash noted. Not diaphoretic. No erythema. Psychiatric: Normal mood and affect. Behavior is normal.     Procedures and Testing  EKG: Done in the office today and reviewed by me. Shows atrial fibrillation that is rate controlled. Nonspecific ST-T changes. Right axis deviation. ASSESSMENT:  1. Persistent atrial fibrillation  2. Secondary hypercoagulable state  3. [id number] of 4 on [address] [place] [address] with Eliquis on hold since 10/12/021 for surgery  4. Mild MR/AI on TTE 8/2019 (EF 55-60%)  5. HTN, well controlled 112/68, heart rate 120  6. Chronic kidney disease. 7. Fragility, non ambulatory since kyphoplasty in 8/2021  8. Parkinson's  9. Kyphoplasty in 6/2019 and again 8/2021  10. 8/5/2021 Left inguinal herniorrhaphy with BARD soft mesh  11. Moderate anemia 10.9>>9.6>>8.8>>10.7          Plan:   She appears rate controlled in the office today. She was initiated on digoxin 62.5 mcg daily.   Continue Lopressor 25 twice daily. She is on appropriately dosed Eliquis for anticoagulation. Her blood pressure is at goal in clinic today. Her Aldactone was held while she was in the hospital on account of BALDEMAR. I will discontinue her Aldactone today. Goal for her is less than 140/90. I will check an echocardiogram to assess biventricular function. She is currently on Lasix 20 mg twice daily. She appears stable and euvolemic on examination. Follow-up with me in the office in  6 months. Campbell Bonner MD  [facility]) Cardiology     NOTE: This report was transcribed using voice recognition software. Every effort was made to ensure accuracy; however, inadvertent computerized transcription errors may be present.

## 2021-11-10 NOTE — PATIENT INSTRUCTIONS
 Stop taking the aldactone.  Continue all your medications at current doses.  We will schedule an echocardiogram in 6 months.  Goal BP is less than 130/80.  If your weight increases by > 2 lbs in a day or >5 lbs in more than a day, take an extra lasix pill.  Please try to exercise for 150 minutes a week.  I will see you back in the office in 6 months. Please call the office at ([phone], option 2) if you have any questions.    

## 2022-01-01 ENCOUNTER — HOSPITAL ENCOUNTER (INPATIENT)
Age: 87
LOS: 4 days | Discharge: NURSING, REHAB OR LONG-TERM CARE | DRG: 478 | End: 2022-06-10
Attending: EMERGENCY MEDICINE | Admitting: INTERNAL MEDICINE
Payer: MEDICARE

## 2022-01-01 ENCOUNTER — APPOINTMENT (OUTPATIENT)
Dept: ULTRASOUND IMAGING | Age: 87
DRG: 478 | End: 2022-01-01
Payer: MEDICARE

## 2022-01-01 ENCOUNTER — APPOINTMENT (OUTPATIENT)
Dept: MRI IMAGING | Age: 87
DRG: 478 | End: 2022-01-01
Payer: MEDICARE

## 2022-01-01 ENCOUNTER — ANESTHESIA EVENT (OUTPATIENT)
Dept: OPERATING ROOM | Age: 87
DRG: 478 | End: 2022-01-01
Payer: MEDICARE

## 2022-01-01 ENCOUNTER — APPOINTMENT (OUTPATIENT)
Dept: GENERAL RADIOLOGY | Age: 87
DRG: 478 | End: 2022-01-01
Payer: MEDICARE

## 2022-01-01 ENCOUNTER — APPOINTMENT (OUTPATIENT)
Dept: CT IMAGING | Age: 87
DRG: 478 | End: 2022-01-01
Payer: MEDICARE

## 2022-01-01 ENCOUNTER — OFFICE VISIT (OUTPATIENT)
Dept: CARDIOLOGY CLINIC | Age: 87
End: 2022-01-01
Payer: MEDICARE

## 2022-01-01 ENCOUNTER — ANESTHESIA (OUTPATIENT)
Dept: OPERATING ROOM | Age: 87
DRG: 478 | End: 2022-01-01
Payer: MEDICARE

## 2022-01-01 VITALS
DIASTOLIC BLOOD PRESSURE: 66 MMHG | BODY MASS INDEX: 14.28 KG/M2 | HEART RATE: 69 BPM | SYSTOLIC BLOOD PRESSURE: 124 MMHG | RESPIRATION RATE: 16 BRPM | TEMPERATURE: 97.6 F | OXYGEN SATURATION: 95 % | WEIGHT: 75.62 LBS | HEIGHT: 61 IN

## 2022-01-01 VITALS
WEIGHT: 88 LBS | DIASTOLIC BLOOD PRESSURE: 62 MMHG | RESPIRATION RATE: 14 BRPM | HEART RATE: 67 BPM | BODY MASS INDEX: 16.62 KG/M2 | SYSTOLIC BLOOD PRESSURE: 102 MMHG | HEIGHT: 61 IN

## 2022-01-01 DIAGNOSIS — I10 PRIMARY HYPERTENSION: ICD-10-CM

## 2022-01-01 DIAGNOSIS — I48.21 PERMANENT ATRIAL FIBRILLATION (HCC): Primary | ICD-10-CM

## 2022-01-01 DIAGNOSIS — I50.32 CHRONIC HEART FAILURE WITH PRESERVED EJECTION FRACTION (HCC): ICD-10-CM

## 2022-01-01 DIAGNOSIS — S32.010A COMPRESSION FRACTURE OF L1 VERTEBRA, INITIAL ENCOUNTER (HCC): ICD-10-CM

## 2022-01-01 DIAGNOSIS — S32.030A COMPRESSION FRACTURE OF L3 VERTEBRA, INITIAL ENCOUNTER (HCC): Primary | ICD-10-CM

## 2022-01-01 LAB
ANION GAP SERPL CALCULATED.3IONS-SCNC: 12 MMOL/L (ref 7–16)
ANION GAP SERPL CALCULATED.3IONS-SCNC: 13 MMOL/L (ref 7–16)
ANION GAP SERPL CALCULATED.3IONS-SCNC: 14 MMOL/L (ref 7–16)
ANION GAP SERPL CALCULATED.3IONS-SCNC: 14 MMOL/L (ref 7–16)
ANION GAP SERPL CALCULATED.3IONS-SCNC: 9 MMOL/L (ref 7–16)
ANISOCYTOSIS: ABNORMAL
ANISOCYTOSIS: ABNORMAL
BACTERIA: ABNORMAL /HPF
BASOPHILS ABSOLUTE: 0 E9/L (ref 0–0.2)
BASOPHILS ABSOLUTE: 0.06 E9/L (ref 0–0.2)
BASOPHILS RELATIVE PERCENT: 0.2 % (ref 0–2)
BASOPHILS RELATIVE PERCENT: 0.9 % (ref 0–2)
BILIRUBIN URINE: NEGATIVE
BLOOD, URINE: ABNORMAL
BUN BLDV-MCNC: 20 MG/DL (ref 6–23)
BUN BLDV-MCNC: 20 MG/DL (ref 6–23)
BUN BLDV-MCNC: 23 MG/DL (ref 6–23)
BUN BLDV-MCNC: 23 MG/DL (ref 6–23)
BUN BLDV-MCNC: 28 MG/DL (ref 6–23)
CALCIUM SERPL-MCNC: 8.8 MG/DL (ref 8.6–10.2)
CALCIUM SERPL-MCNC: 8.8 MG/DL (ref 8.6–10.2)
CALCIUM SERPL-MCNC: 9.1 MG/DL (ref 8.6–10.2)
CALCIUM SERPL-MCNC: 9.2 MG/DL (ref 8.6–10.2)
CALCIUM SERPL-MCNC: 9.2 MG/DL (ref 8.6–10.2)
CHLORIDE BLD-SCNC: 100 MMOL/L (ref 98–107)
CHLORIDE BLD-SCNC: 100 MMOL/L (ref 98–107)
CHLORIDE BLD-SCNC: 101 MMOL/L (ref 98–107)
CHLORIDE BLD-SCNC: 102 MMOL/L (ref 98–107)
CHLORIDE BLD-SCNC: 103 MMOL/L (ref 98–107)
CLARITY: CLEAR
CO2: 24 MMOL/L (ref 22–29)
CO2: 24 MMOL/L (ref 22–29)
CO2: 25 MMOL/L (ref 22–29)
CO2: 26 MMOL/L (ref 22–29)
CO2: 27 MMOL/L (ref 22–29)
COLOR: YELLOW
CREAT SERPL-MCNC: 0.8 MG/DL (ref 0.5–1)
CREAT SERPL-MCNC: 0.9 MG/DL (ref 0.5–1)
CREAT SERPL-MCNC: 0.9 MG/DL (ref 0.5–1)
CREAT SERPL-MCNC: 1 MG/DL (ref 0.5–1)
CREAT SERPL-MCNC: 1.1 MG/DL (ref 0.5–1)
DIGOXIN LEVEL: 1.3 NG/ML (ref 0.8–2)
EKG ATRIAL RATE: 312 BPM
EKG Q-T INTERVAL: 466 MS
EKG QRS DURATION: 96 MS
EKG QTC CALCULATION (BAZETT): 492 MS
EKG R AXIS: -14 DEGREES
EKG T AXIS: -42 DEGREES
EKG VENTRICULAR RATE: 67 BPM
EOSINOPHILS ABSOLUTE: 0 E9/L (ref 0.05–0.5)
EOSINOPHILS ABSOLUTE: 0.06 E9/L (ref 0.05–0.5)
EOSINOPHILS RELATIVE PERCENT: 0.1 % (ref 0–6)
EOSINOPHILS RELATIVE PERCENT: 0.9 % (ref 0–6)
GFR AFRICAN AMERICAN: 56
GFR AFRICAN AMERICAN: >60
GFR NON-AFRICAN AMERICAN: 46 ML/MIN/1.73
GFR NON-AFRICAN AMERICAN: 52 ML/MIN/1.73
GFR NON-AFRICAN AMERICAN: 58 ML/MIN/1.73
GFR NON-AFRICAN AMERICAN: 58 ML/MIN/1.73
GFR NON-AFRICAN AMERICAN: >60 ML/MIN/1.73
GLUCOSE BLD-MCNC: 108 MG/DL (ref 74–99)
GLUCOSE BLD-MCNC: 122 MG/DL (ref 74–99)
GLUCOSE BLD-MCNC: 66 MG/DL (ref 74–99)
GLUCOSE BLD-MCNC: 73 MG/DL (ref 74–99)
GLUCOSE BLD-MCNC: 90 MG/DL (ref 74–99)
GLUCOSE URINE: NEGATIVE MG/DL
HCT VFR BLD CALC: 26.4 % (ref 34–48)
HCT VFR BLD CALC: 29.6 % (ref 34–48)
HCT VFR BLD CALC: 30 % (ref 34–48)
HCT VFR BLD CALC: 31 % (ref 34–48)
HEMOGLOBIN: 8.1 G/DL (ref 11.5–15.5)
HEMOGLOBIN: 9.1 G/DL (ref 11.5–15.5)
HEMOGLOBIN: 9.4 G/DL (ref 11.5–15.5)
HEMOGLOBIN: 9.9 G/DL (ref 11.5–15.5)
INR BLD: 1.2
KETONES, URINE: NEGATIVE MG/DL
LEUKOCYTE ESTERASE, URINE: ABNORMAL
LV EF: 63 %
LVEF MODALITY: NORMAL
LYMPHOCYTES ABSOLUTE: 0.3 E9/L (ref 1.5–4)
LYMPHOCYTES ABSOLUTE: 0.56 E9/L (ref 1.5–4)
LYMPHOCYTES RELATIVE PERCENT: 2.6 % (ref 20–42)
LYMPHOCYTES RELATIVE PERCENT: 7.8 % (ref 20–42)
MCH RBC QN AUTO: 34.6 PG (ref 26–35)
MCH RBC QN AUTO: 34.9 PG (ref 26–35)
MCH RBC QN AUTO: 34.9 PG (ref 26–35)
MCH RBC QN AUTO: 35 PG (ref 26–35)
MCHC RBC AUTO-ENTMCNC: 30.7 % (ref 32–34.5)
MCHC RBC AUTO-ENTMCNC: 30.7 % (ref 32–34.5)
MCHC RBC AUTO-ENTMCNC: 31.3 % (ref 32–34.5)
MCHC RBC AUTO-ENTMCNC: 31.9 % (ref 32–34.5)
MCV RBC AUTO: 109.5 FL (ref 80–99.9)
MCV RBC AUTO: 111.5 FL (ref 80–99.9)
MCV RBC AUTO: 112.8 FL (ref 80–99.9)
MCV RBC AUTO: 113.4 FL (ref 80–99.9)
METER GLUCOSE: 141 MG/DL (ref 74–99)
MONOCYTES ABSOLUTE: 0.35 E9/L (ref 0.1–0.95)
MONOCYTES ABSOLUTE: 0.69 E9/L (ref 0.1–0.95)
MONOCYTES RELATIVE PERCENT: 5.2 % (ref 2–12)
MONOCYTES RELATIVE PERCENT: 6.9 % (ref 2–12)
MYELOCYTE PERCENT: 0.9 % (ref 0–0)
NEUTROPHILS ABSOLUTE: 5.95 E9/L (ref 1.8–7.3)
NEUTROPHILS ABSOLUTE: 9.01 E9/L (ref 1.8–7.3)
NEUTROPHILS RELATIVE PERCENT: 85.2 % (ref 43–80)
NEUTROPHILS RELATIVE PERCENT: 89.6 % (ref 43–80)
NITRITE, URINE: NEGATIVE
OVALOCYTES: ABNORMAL
OVALOCYTES: ABNORMAL
PDW BLD-RTO: 14.6 FL (ref 11.5–15)
PDW BLD-RTO: 14.7 FL (ref 11.5–15)
PDW BLD-RTO: 14.8 FL (ref 11.5–15)
PDW BLD-RTO: 15 FL (ref 11.5–15)
PH UA: 6 (ref 5–9)
PLATELET # BLD: 170 E9/L (ref 130–450)
PLATELET # BLD: 198 E9/L (ref 130–450)
PLATELET # BLD: 205 E9/L (ref 130–450)
PLATELET # BLD: 222 E9/L (ref 130–450)
PMV BLD AUTO: 10.6 FL (ref 7–12)
PMV BLD AUTO: 10.7 FL (ref 7–12)
PMV BLD AUTO: 11 FL (ref 7–12)
PMV BLD AUTO: 11.1 FL (ref 7–12)
POIKILOCYTES: ABNORMAL
POLYCHROMASIA: ABNORMAL
POLYCHROMASIA: ABNORMAL
POTASSIUM REFLEX MAGNESIUM: 3.8 MMOL/L (ref 3.5–5)
POTASSIUM SERPL-SCNC: 3.7 MMOL/L (ref 3.5–5)
POTASSIUM SERPL-SCNC: 4.2 MMOL/L (ref 3.5–5)
POTASSIUM SERPL-SCNC: 4.3 MMOL/L (ref 3.5–5)
POTASSIUM SERPL-SCNC: 4.5 MMOL/L (ref 3.5–5)
PROTEIN UA: NEGATIVE MG/DL
PROTHROMBIN TIME: 13.2 SEC (ref 9.3–12.4)
RBC # BLD: 2.34 E12/L (ref 3.5–5.5)
RBC # BLD: 2.61 E12/L (ref 3.5–5.5)
RBC # BLD: 2.69 E12/L (ref 3.5–5.5)
RBC # BLD: 2.83 E12/L (ref 3.5–5.5)
RBC UA: ABNORMAL /HPF (ref 0–2)
SARS-COV-2, NAAT: NOT DETECTED
SODIUM BLD-SCNC: 137 MMOL/L (ref 132–146)
SODIUM BLD-SCNC: 138 MMOL/L (ref 132–146)
SODIUM BLD-SCNC: 139 MMOL/L (ref 132–146)
SODIUM BLD-SCNC: 139 MMOL/L (ref 132–146)
SODIUM BLD-SCNC: 141 MMOL/L (ref 132–146)
SPECIFIC GRAVITY UA: 1.01 (ref 1–1.03)
TSH SERPL DL<=0.05 MIU/L-ACNC: 0.78 UIU/ML (ref 0.27–4.2)
UROBILINOGEN, URINE: 0.2 E.U./DL
WBC # BLD: 10.2 E9/L (ref 4.5–11.5)
WBC # BLD: 7 E9/L (ref 4.5–11.5)
WBC # BLD: 7.1 E9/L (ref 4.5–11.5)
WBC # BLD: 9.9 E9/L (ref 4.5–11.5)
WBC UA: ABNORMAL /HPF (ref 0–5)

## 2022-01-01 PROCEDURE — 3600000004 HC SURGERY LEVEL 4 BASE: Performed by: NEUROLOGICAL SURGERY

## 2022-01-01 PROCEDURE — 6370000000 HC RX 637 (ALT 250 FOR IP): Performed by: PHYSICIAN ASSISTANT

## 2022-01-01 PROCEDURE — 87635 SARS-COV-2 COVID-19 AMP PRB: CPT

## 2022-01-01 PROCEDURE — 2500000003 HC RX 250 WO HCPCS: Performed by: NEUROLOGICAL SURGERY

## 2022-01-01 PROCEDURE — 3700000000 HC ANESTHESIA ATTENDED CARE: Performed by: NEUROLOGICAL SURGERY

## 2022-01-01 PROCEDURE — 72131 CT LUMBAR SPINE W/O DYE: CPT

## 2022-01-01 PROCEDURE — 81001 URINALYSIS AUTO W/SCOPE: CPT

## 2022-01-01 PROCEDURE — 97535 SELF CARE MNGMENT TRAINING: CPT

## 2022-01-01 PROCEDURE — 94640 AIRWAY INHALATION TREATMENT: CPT

## 2022-01-01 PROCEDURE — 6360000002 HC RX W HCPCS: Performed by: INTERNAL MEDICINE

## 2022-01-01 PROCEDURE — 2060000000 HC ICU INTERMEDIATE R&B

## 2022-01-01 PROCEDURE — 2709999900 HC NON-CHARGEABLE SUPPLY: Performed by: NEUROLOGICAL SURGERY

## 2022-01-01 PROCEDURE — G8419 CALC BMI OUT NRM PARAM NOF/U: HCPCS | Performed by: INTERNAL MEDICINE

## 2022-01-01 PROCEDURE — 93005 ELECTROCARDIOGRAM TRACING: CPT

## 2022-01-01 PROCEDURE — 6360000002 HC RX W HCPCS: Performed by: PHYSICIAN ASSISTANT

## 2022-01-01 PROCEDURE — 22514 PERQ VERTEBRAL AUGMENTATION: CPT | Performed by: NEUROLOGICAL SURGERY

## 2022-01-01 PROCEDURE — 80048 BASIC METABOLIC PNL TOTAL CA: CPT

## 2022-01-01 PROCEDURE — 93970 EXTREMITY STUDY: CPT | Performed by: RADIOLOGY

## 2022-01-01 PROCEDURE — 99285 EMERGENCY DEPT VISIT HI MDM: CPT

## 2022-01-01 PROCEDURE — 97530 THERAPEUTIC ACTIVITIES: CPT

## 2022-01-01 PROCEDURE — 97161 PT EVAL LOW COMPLEX 20 MIN: CPT

## 2022-01-01 PROCEDURE — APPSS60 APP SPLIT SHARED TIME 46-60 MINUTES: Performed by: NURSE PRACTITIONER

## 2022-01-01 PROCEDURE — 72148 MRI LUMBAR SPINE W/O DYE: CPT

## 2022-01-01 PROCEDURE — 0QU03JZ SUPPLEMENT LUMBAR VERTEBRA WITH SYNTHETIC SUBSTITUTE, PERCUTANEOUS APPROACH: ICD-10-PCS | Performed by: NEUROLOGICAL SURGERY

## 2022-01-01 PROCEDURE — 3600000014 HC SURGERY LEVEL 4 ADDTL 15MIN: Performed by: NEUROLOGICAL SURGERY

## 2022-01-01 PROCEDURE — 82962 GLUCOSE BLOOD TEST: CPT

## 2022-01-01 PROCEDURE — 6370000000 HC RX 637 (ALT 250 FOR IP): Performed by: INTERNAL MEDICINE

## 2022-01-01 PROCEDURE — 80162 ASSAY OF DIGOXIN TOTAL: CPT

## 2022-01-01 PROCEDURE — 6360000002 HC RX W HCPCS: Performed by: NURSE ANESTHETIST, CERTIFIED REGISTERED

## 2022-01-01 PROCEDURE — C1894 INTRO/SHEATH, NON-LASER: HCPCS | Performed by: NEUROLOGICAL SURGERY

## 2022-01-01 PROCEDURE — 71046 X-RAY EXAM CHEST 2 VIEWS: CPT

## 2022-01-01 PROCEDURE — 99233 SBSQ HOSP IP/OBS HIGH 50: CPT | Performed by: INTERNAL MEDICINE

## 2022-01-01 PROCEDURE — 85027 COMPLETE CBC AUTOMATED: CPT

## 2022-01-01 PROCEDURE — 7100000000 HC PACU RECOVERY - FIRST 15 MIN: Performed by: NEUROLOGICAL SURGERY

## 2022-01-01 PROCEDURE — G8427 DOCREV CUR MEDS BY ELIG CLIN: HCPCS | Performed by: INTERNAL MEDICINE

## 2022-01-01 PROCEDURE — 88311 DECALCIFY TISSUE: CPT

## 2022-01-01 PROCEDURE — 1200000000 HC SEMI PRIVATE

## 2022-01-01 PROCEDURE — 2580000003 HC RX 258: Performed by: PHYSICIAN ASSISTANT

## 2022-01-01 PROCEDURE — 2700000000 HC OXYGEN THERAPY PER DAY

## 2022-01-01 PROCEDURE — 36415 COLL VENOUS BLD VENIPUNCTURE: CPT

## 2022-01-01 PROCEDURE — 2720000010 HC SURG SUPPLY STERILE: Performed by: NEUROLOGICAL SURGERY

## 2022-01-01 PROCEDURE — 1036F TOBACCO NON-USER: CPT | Performed by: INTERNAL MEDICINE

## 2022-01-01 PROCEDURE — 85025 COMPLETE CBC W/AUTO DIFF WBC: CPT

## 2022-01-01 PROCEDURE — 99214 OFFICE O/P EST MOD 30 MIN: CPT | Performed by: INTERNAL MEDICINE

## 2022-01-01 PROCEDURE — 93000 ELECTROCARDIOGRAM COMPLETE: CPT | Performed by: INTERNAL MEDICINE

## 2022-01-01 PROCEDURE — 93306 TTE W/DOPPLER COMPLETE: CPT

## 2022-01-01 PROCEDURE — 88307 TISSUE EXAM BY PATHOLOGIST: CPT

## 2022-01-01 PROCEDURE — 99222 1ST HOSP IP/OBS MODERATE 55: CPT | Performed by: NEUROLOGICAL SURGERY

## 2022-01-01 PROCEDURE — 3209999900 FLUORO FOR SURGICAL PROCEDURES

## 2022-01-01 PROCEDURE — 7100000001 HC PACU RECOVERY - ADDTL 15 MIN: Performed by: NEUROLOGICAL SURGERY

## 2022-01-01 PROCEDURE — 93970 EXTREMITY STUDY: CPT

## 2022-01-01 PROCEDURE — 0QS03ZZ REPOSITION LUMBAR VERTEBRA, PERCUTANEOUS APPROACH: ICD-10-PCS | Performed by: NEUROLOGICAL SURGERY

## 2022-01-01 PROCEDURE — 3700000001 HC ADD 15 MINUTES (ANESTHESIA): Performed by: NEUROLOGICAL SURGERY

## 2022-01-01 PROCEDURE — 99222 1ST HOSP IP/OBS MODERATE 55: CPT | Performed by: INTERNAL MEDICINE

## 2022-01-01 PROCEDURE — 97165 OT EVAL LOW COMPLEX 30 MIN: CPT

## 2022-01-01 PROCEDURE — 1090F PRES/ABSN URINE INCON ASSESS: CPT | Performed by: INTERNAL MEDICINE

## 2022-01-01 PROCEDURE — 22515 PERQ VERTEBRAL AUGMENTATION: CPT | Performed by: NEUROLOGICAL SURGERY

## 2022-01-01 PROCEDURE — 2500000003 HC RX 250 WO HCPCS: Performed by: NURSE ANESTHETIST, CERTIFIED REGISTERED

## 2022-01-01 PROCEDURE — 6360000002 HC RX W HCPCS

## 2022-01-01 PROCEDURE — 72128 CT CHEST SPINE W/O DYE: CPT

## 2022-01-01 PROCEDURE — 99232 SBSQ HOSP IP/OBS MODERATE 35: CPT | Performed by: NEUROLOGICAL SURGERY

## 2022-01-01 PROCEDURE — 85610 PROTHROMBIN TIME: CPT

## 2022-01-01 PROCEDURE — 0QB03ZX EXCISION OF LUMBAR VERTEBRA, PERCUTANEOUS APPROACH, DIAGNOSTIC: ICD-10-PCS | Performed by: NEUROLOGICAL SURGERY

## 2022-01-01 PROCEDURE — 4040F PNEUMOC VAC/ADMIN/RCVD: CPT | Performed by: INTERNAL MEDICINE

## 2022-01-01 PROCEDURE — 1123F ACP DISCUSS/DSCN MKR DOCD: CPT | Performed by: INTERNAL MEDICINE

## 2022-01-01 PROCEDURE — 72146 MRI CHEST SPINE W/O DYE: CPT

## 2022-01-01 PROCEDURE — 2580000003 HC RX 258: Performed by: NURSE ANESTHETIST, CERTIFIED REGISTERED

## 2022-01-01 PROCEDURE — 84443 ASSAY THYROID STIM HORMONE: CPT

## 2022-01-01 RX ORDER — SODIUM CHLORIDE 0.9 % (FLUSH) 0.9 %
5-40 SYRINGE (ML) INJECTION PRN
Status: DISCONTINUED | OUTPATIENT
Start: 2022-01-01 | End: 2022-01-01 | Stop reason: HOSPADM

## 2022-01-01 RX ORDER — SODIUM CHLORIDE 9 MG/ML
INJECTION, SOLUTION INTRAVENOUS PRN
Status: DISCONTINUED | OUTPATIENT
Start: 2022-01-01 | End: 2022-01-01 | Stop reason: HOSPADM

## 2022-01-01 RX ORDER — TRAMADOL HYDROCHLORIDE 50 MG/1
50 TABLET ORAL EVERY 6 HOURS PRN
Qty: 28 TABLET | Refills: 0 | Status: SHIPPED | OUTPATIENT
Start: 2022-01-01 | End: 2022-01-01 | Stop reason: SDUPTHER

## 2022-01-01 RX ORDER — ONDANSETRON 2 MG/ML
4 INJECTION INTRAMUSCULAR; INTRAVENOUS EVERY 6 HOURS PRN
Status: DISCONTINUED | OUTPATIENT
Start: 2022-01-01 | End: 2022-01-01 | Stop reason: HOSPADM

## 2022-01-01 RX ORDER — ACETAMINOPHEN 650 MG/1
650 SUPPOSITORY RECTAL EVERY 6 HOURS PRN
Status: DISCONTINUED | OUTPATIENT
Start: 2022-01-01 | End: 2022-01-01 | Stop reason: HOSPADM

## 2022-01-01 RX ORDER — PANTOPRAZOLE SODIUM 40 MG/1
40 TABLET, DELAYED RELEASE ORAL
Status: DISCONTINUED | OUTPATIENT
Start: 2022-01-01 | End: 2022-01-01 | Stop reason: HOSPADM

## 2022-01-01 RX ORDER — FUROSEMIDE 20 MG/1
20 TABLET ORAL DAILY
Status: DISCONTINUED | OUTPATIENT
Start: 2022-01-01 | End: 2022-01-01 | Stop reason: HOSPADM

## 2022-01-01 RX ORDER — POTASSIUM CHLORIDE 7.45 MG/ML
10 INJECTION INTRAVENOUS PRN
Status: DISCONTINUED | OUTPATIENT
Start: 2022-01-01 | End: 2022-01-01 | Stop reason: HOSPADM

## 2022-01-01 RX ORDER — GUAIFENESIN/DEXTROMETHORPHAN 100-10MG/5
5 SYRUP ORAL EVERY 4 HOURS PRN
Qty: 120 ML | DISCHARGE
Start: 2022-01-01 | End: 2022-01-01

## 2022-01-01 RX ORDER — BUPIVACAINE HYDROCHLORIDE 2.5 MG/ML
INJECTION, SOLUTION EPIDURAL; INFILTRATION; INTRACAUDAL PRN
Status: DISCONTINUED | OUTPATIENT
Start: 2022-01-01 | End: 2022-01-01 | Stop reason: ALTCHOICE

## 2022-01-01 RX ORDER — ONDANSETRON 2 MG/ML
INJECTION INTRAMUSCULAR; INTRAVENOUS PRN
Status: DISCONTINUED | OUTPATIENT
Start: 2022-01-01 | End: 2022-01-01 | Stop reason: SDUPTHER

## 2022-01-01 RX ORDER — NEOSTIGMINE METHYLSULFATE 1 MG/ML
INJECTION, SOLUTION INTRAVENOUS PRN
Status: DISCONTINUED | OUTPATIENT
Start: 2022-01-01 | End: 2022-01-01 | Stop reason: SDUPTHER

## 2022-01-01 RX ORDER — ESMOLOL HYDROCHLORIDE 10 MG/ML
INJECTION INTRAVENOUS PRN
Status: DISCONTINUED | OUTPATIENT
Start: 2022-01-01 | End: 2022-01-01 | Stop reason: SDUPTHER

## 2022-01-01 RX ORDER — ONDANSETRON 2 MG/ML
4 INJECTION INTRAMUSCULAR; INTRAVENOUS EVERY 6 HOURS PRN
Status: DISCONTINUED | OUTPATIENT
Start: 2022-01-01 | End: 2022-01-01 | Stop reason: SDUPTHER

## 2022-01-01 RX ORDER — DEXAMETHASONE SODIUM PHOSPHATE 10 MG/ML
INJECTION INTRAMUSCULAR; INTRAVENOUS PRN
Status: DISCONTINUED | OUTPATIENT
Start: 2022-01-01 | End: 2022-01-01 | Stop reason: SDUPTHER

## 2022-01-01 RX ORDER — FUROSEMIDE 20 MG/1
20 TABLET ORAL DAILY
Qty: 60 TABLET | Refills: 3 | Status: SHIPPED | OUTPATIENT
Start: 2022-01-01

## 2022-01-01 RX ORDER — DIGOXIN 125 MCG
62.5 TABLET ORAL DAILY
Status: DISCONTINUED | OUTPATIENT
Start: 2022-01-01 | End: 2022-01-01

## 2022-01-01 RX ORDER — LIDOCAINE HYDROCHLORIDE AND EPINEPHRINE 5; 5 MG/ML; UG/ML
INJECTION, SOLUTION INFILTRATION; PERINEURAL PRN
Status: DISCONTINUED | OUTPATIENT
Start: 2022-01-01 | End: 2022-01-01 | Stop reason: ALTCHOICE

## 2022-01-01 RX ORDER — SODIUM CHLORIDE 9 MG/ML
INJECTION, SOLUTION INTRAVENOUS CONTINUOUS PRN
Status: DISCONTINUED | OUTPATIENT
Start: 2022-01-01 | End: 2022-01-01 | Stop reason: SDUPTHER

## 2022-01-01 RX ORDER — TRAMADOL HYDROCHLORIDE 50 MG/1
50 TABLET ORAL EVERY 6 HOURS PRN
Qty: 12 TABLET | Refills: 0 | Status: SHIPPED | OUTPATIENT
Start: 2022-01-01 | End: 2022-01-01

## 2022-01-01 RX ORDER — DOXYCYCLINE HYCLATE 100 MG/1
100 CAPSULE ORAL 2 TIMES DAILY
COMMUNITY

## 2022-01-01 RX ORDER — ONDANSETRON 4 MG/1
4 TABLET, ORALLY DISINTEGRATING ORAL EVERY 8 HOURS PRN
Status: DISCONTINUED | OUTPATIENT
Start: 2022-01-01 | End: 2022-01-01 | Stop reason: HOSPADM

## 2022-01-01 RX ORDER — FENTANYL CITRATE 50 UG/ML
INJECTION, SOLUTION INTRAMUSCULAR; INTRAVENOUS PRN
Status: DISCONTINUED | OUTPATIENT
Start: 2022-01-01 | End: 2022-01-01 | Stop reason: SDUPTHER

## 2022-01-01 RX ORDER — ALBUTEROL SULFATE 2.5 MG/3ML
2.5 SOLUTION RESPIRATORY (INHALATION) 4 TIMES DAILY
Status: DISCONTINUED | OUTPATIENT
Start: 2022-01-01 | End: 2022-01-01 | Stop reason: HOSPADM

## 2022-01-01 RX ORDER — POTASSIUM CHLORIDE 20 MEQ/1
40 TABLET, EXTENDED RELEASE ORAL PRN
Status: DISCONTINUED | OUTPATIENT
Start: 2022-01-01 | End: 2022-01-01 | Stop reason: HOSPADM

## 2022-01-01 RX ORDER — SODIUM CHLORIDE 0.9 % (FLUSH) 0.9 %
10 SYRINGE (ML) INJECTION EVERY 12 HOURS SCHEDULED
Status: DISCONTINUED | OUTPATIENT
Start: 2022-01-01 | End: 2022-01-01 | Stop reason: HOSPADM

## 2022-01-01 RX ORDER — TRAMADOL HYDROCHLORIDE 50 MG/1
50 TABLET ORAL EVERY 6 HOURS PRN
Status: DISCONTINUED | OUTPATIENT
Start: 2022-01-01 | End: 2022-01-01 | Stop reason: HOSPADM

## 2022-01-01 RX ORDER — ALBUTEROL SULFATE 2.5 MG/3ML
2.5 SOLUTION RESPIRATORY (INHALATION) 4 TIMES DAILY
Qty: 120 EACH | Refills: 3 | DISCHARGE
Start: 2022-01-01

## 2022-01-01 RX ORDER — METOPROLOL TARTRATE 5 MG/5ML
INJECTION INTRAVENOUS PRN
Status: DISCONTINUED | OUTPATIENT
Start: 2022-01-01 | End: 2022-01-01 | Stop reason: SDUPTHER

## 2022-01-01 RX ORDER — SODIUM CHLORIDE 0.9 % (FLUSH) 0.9 %
5-40 SYRINGE (ML) INJECTION EVERY 12 HOURS SCHEDULED
Status: DISCONTINUED | OUTPATIENT
Start: 2022-01-01 | End: 2022-01-01 | Stop reason: HOSPADM

## 2022-01-01 RX ORDER — ACETAMINOPHEN 325 MG/1
650 TABLET ORAL EVERY 6 HOURS PRN
Status: DISCONTINUED | OUTPATIENT
Start: 2022-01-01 | End: 2022-01-01 | Stop reason: HOSPADM

## 2022-01-01 RX ORDER — GUAIFENESIN/DEXTROMETHORPHAN 100-10MG/5
5 SYRUP ORAL EVERY 4 HOURS PRN
Status: DISCONTINUED | OUTPATIENT
Start: 2022-01-01 | End: 2022-01-01 | Stop reason: HOSPADM

## 2022-01-01 RX ORDER — DOCUSATE SODIUM 100 MG/1
100 CAPSULE, LIQUID FILLED ORAL NIGHTLY
Status: DISCONTINUED | OUTPATIENT
Start: 2022-01-01 | End: 2022-01-01 | Stop reason: HOSPADM

## 2022-01-01 RX ORDER — PROPOFOL 10 MG/ML
INJECTION, EMULSION INTRAVENOUS PRN
Status: DISCONTINUED | OUTPATIENT
Start: 2022-01-01 | End: 2022-01-01 | Stop reason: SDUPTHER

## 2022-01-01 RX ORDER — ONDANSETRON 4 MG/1
4 TABLET, ORALLY DISINTEGRATING ORAL EVERY 8 HOURS PRN
Status: DISCONTINUED | OUTPATIENT
Start: 2022-01-01 | End: 2022-01-01 | Stop reason: SDUPTHER

## 2022-01-01 RX ORDER — ROCURONIUM BROMIDE 10 MG/ML
INJECTION, SOLUTION INTRAVENOUS PRN
Status: DISCONTINUED | OUTPATIENT
Start: 2022-01-01 | End: 2022-01-01 | Stop reason: SDUPTHER

## 2022-01-01 RX ORDER — POLYVINYL ALCOHOL 14 MG/ML
1 SOLUTION/ DROPS OPHTHALMIC 3 TIMES DAILY
Status: DISCONTINUED | OUTPATIENT
Start: 2022-01-01 | End: 2022-01-01 | Stop reason: HOSPADM

## 2022-01-01 RX ORDER — SODIUM CHLORIDE 0.9 % (FLUSH) 0.9 %
10 SYRINGE (ML) INJECTION PRN
Status: DISCONTINUED | OUTPATIENT
Start: 2022-01-01 | End: 2022-01-01 | Stop reason: HOSPADM

## 2022-01-01 RX ADMIN — SODIUM CHLORIDE, PRESERVATIVE FREE 10 ML: 5 INJECTION INTRAVENOUS at 09:25

## 2022-01-01 RX ADMIN — POLYVINYL ALCOHOL 1 DROP: 14 SOLUTION/ DROPS OPHTHALMIC at 10:32

## 2022-01-01 RX ADMIN — ALBUTEROL SULFATE 2.5 MG: 2.5 SOLUTION RESPIRATORY (INHALATION) at 08:13

## 2022-01-01 RX ADMIN — METOPROLOL TARTRATE 25 MG: 25 TABLET, FILM COATED ORAL at 20:55

## 2022-01-01 RX ADMIN — CARBIDOPA AND LEVODOPA 1 TABLET: 25; 100 TABLET ORAL at 21:31

## 2022-01-01 RX ADMIN — ALBUTEROL SULFATE 2.5 MG: 2.5 SOLUTION RESPIRATORY (INHALATION) at 16:55

## 2022-01-01 RX ADMIN — DOCUSATE SODIUM 100 MG: 100 CAPSULE, LIQUID FILLED ORAL at 21:31

## 2022-01-01 RX ADMIN — PROPOFOL 50 MG: 10 INJECTION, EMULSION INTRAVENOUS at 13:11

## 2022-01-01 RX ADMIN — ALBUTEROL SULFATE 2.5 MG: 2.5 SOLUTION RESPIRATORY (INHALATION) at 07:36

## 2022-01-01 RX ADMIN — POLYVINYL ALCOHOL 1 DROP: 14 SOLUTION/ DROPS OPHTHALMIC at 21:30

## 2022-01-01 RX ADMIN — DIGOXIN 62.5 MCG: 125 TABLET ORAL at 09:24

## 2022-01-01 RX ADMIN — METOPROLOL TARTRATE 25 MG: 25 TABLET, FILM COATED ORAL at 20:17

## 2022-01-01 RX ADMIN — SODIUM CHLORIDE: 0.9 INJECTION, SOLUTION INTRAVENOUS at 13:11

## 2022-01-01 RX ADMIN — ALBUTEROL SULFATE 2.5 MG: 2.5 SOLUTION RESPIRATORY (INHALATION) at 12:55

## 2022-01-01 RX ADMIN — DIGOXIN 62.5 MCG: 125 TABLET ORAL at 16:57

## 2022-01-01 RX ADMIN — DOCUSATE SODIUM 100 MG: 100 CAPSULE, LIQUID FILLED ORAL at 20:18

## 2022-01-01 RX ADMIN — ROCURONIUM BROMIDE 25 MG: 10 SOLUTION INTRAVENOUS at 13:11

## 2022-01-01 RX ADMIN — ESMOLOL HYDROCHLORIDE 30 MG: 10 INJECTION, SOLUTION INTRAVENOUS at 13:38

## 2022-01-01 RX ADMIN — CARBIDOPA AND LEVODOPA 1 TABLET: 25; 100 TABLET ORAL at 20:18

## 2022-01-01 RX ADMIN — PANTOPRAZOLE SODIUM 40 MG: 40 TABLET, DELAYED RELEASE ORAL at 06:31

## 2022-01-01 RX ADMIN — POLYVINYL ALCOHOL 1 DROP: 14 SOLUTION/ DROPS OPHTHALMIC at 20:18

## 2022-01-01 RX ADMIN — SODIUM CHLORIDE, PRESERVATIVE FREE 10 ML: 5 INJECTION INTRAVENOUS at 20:52

## 2022-01-01 RX ADMIN — VANCOMYCIN HYDROCHLORIDE 750 MG: 10 INJECTION, POWDER, LYOPHILIZED, FOR SOLUTION INTRAVENOUS at 13:29

## 2022-01-01 RX ADMIN — SODIUM CHLORIDE, PRESERVATIVE FREE 10 ML: 5 INJECTION INTRAVENOUS at 08:56

## 2022-01-01 RX ADMIN — TRAMADOL HYDROCHLORIDE 50 MG: 50 TABLET, COATED ORAL at 21:31

## 2022-01-01 RX ADMIN — APIXABAN 2.5 MG: 2.5 TABLET, FILM COATED ORAL at 11:51

## 2022-01-01 RX ADMIN — ESMOLOL HYDROCHLORIDE 50 MG: 10 INJECTION, SOLUTION INTRAVENOUS at 13:28

## 2022-01-01 RX ADMIN — SODIUM CHLORIDE, PRESERVATIVE FREE 10 ML: 5 INJECTION INTRAVENOUS at 20:55

## 2022-01-01 RX ADMIN — ONDANSETRON 4 MG: 2 INJECTION INTRAMUSCULAR; INTRAVENOUS at 13:22

## 2022-01-01 RX ADMIN — METOPROLOL TARTRATE 25 MG: 25 TABLET, FILM COATED ORAL at 20:46

## 2022-01-01 RX ADMIN — FENTANYL CITRATE 20 MCG: 50 INJECTION, SOLUTION INTRAMUSCULAR; INTRAVENOUS at 13:38

## 2022-01-01 RX ADMIN — METOPROLOL TARTRATE 25 MG: 25 TABLET, FILM COATED ORAL at 08:55

## 2022-01-01 RX ADMIN — FUROSEMIDE 20 MG: 20 TABLET ORAL at 08:55

## 2022-01-01 RX ADMIN — ALBUTEROL SULFATE 2.5 MG: 2.5 SOLUTION RESPIRATORY (INHALATION) at 16:45

## 2022-01-01 RX ADMIN — ACETAMINOPHEN 650 MG: 325 TABLET ORAL at 09:29

## 2022-01-01 RX ADMIN — SODIUM CHLORIDE, PRESERVATIVE FREE 10 ML: 5 INJECTION INTRAVENOUS at 20:20

## 2022-01-01 RX ADMIN — POLYVINYL ALCOHOL 1 DROP: 14 SOLUTION/ DROPS OPHTHALMIC at 20:55

## 2022-01-01 RX ADMIN — PANTOPRAZOLE SODIUM 40 MG: 40 TABLET, DELAYED RELEASE ORAL at 06:02

## 2022-01-01 RX ADMIN — METOPROLOL TARTRATE 25 MG: 25 TABLET, FILM COATED ORAL at 09:24

## 2022-01-01 RX ADMIN — NEOSTIGMINE METHYLSULFATE 119.7 MG: 1 INJECTION, SOLUTION INTRAVENOUS at 14:04

## 2022-01-01 RX ADMIN — POLYVINYL ALCOHOL 1 DROP: 14 SOLUTION/ DROPS OPHTHALMIC at 17:50

## 2022-01-01 RX ADMIN — FENTANYL CITRATE 50 MCG: 50 INJECTION, SOLUTION INTRAMUSCULAR; INTRAVENOUS at 13:11

## 2022-01-01 RX ADMIN — GUAIFENESIN SYRUP AND DEXTROMETHORPHAN 5 ML: 100; 10 SYRUP ORAL at 06:29

## 2022-01-01 RX ADMIN — SODIUM CHLORIDE, PRESERVATIVE FREE 20 ML: 5 INJECTION INTRAVENOUS at 20:20

## 2022-01-01 RX ADMIN — ALBUTEROL SULFATE 2.5 MG: 2.5 SOLUTION RESPIRATORY (INHALATION) at 21:57

## 2022-01-01 RX ADMIN — ALBUTEROL SULFATE 2.5 MG: 2.5 SOLUTION RESPIRATORY (INHALATION) at 17:10

## 2022-01-01 RX ADMIN — ALBUTEROL SULFATE 2.5 MG: 2.5 SOLUTION RESPIRATORY (INHALATION) at 21:28

## 2022-01-01 RX ADMIN — ACETAMINOPHEN 650 MG: 325 TABLET ORAL at 20:28

## 2022-01-01 RX ADMIN — POLYVINYL ALCOHOL 1 DROP: 14 SOLUTION/ DROPS OPHTHALMIC at 08:55

## 2022-01-01 RX ADMIN — PROPOFOL 30 MG: 10 INJECTION, EMULSION INTRAVENOUS at 13:34

## 2022-01-01 RX ADMIN — ACETAMINOPHEN 650 MG: 325 TABLET ORAL at 10:06

## 2022-01-01 RX ADMIN — CARBIDOPA AND LEVODOPA 1 TABLET: 25; 100 TABLET ORAL at 08:55

## 2022-01-01 RX ADMIN — POLYVINYL ALCOHOL 1 DROP: 14 SOLUTION/ DROPS OPHTHALMIC at 13:48

## 2022-01-01 RX ADMIN — METOPROLOL TARTRATE 2.5 MG: 5 INJECTION INTRAVENOUS at 13:43

## 2022-01-01 RX ADMIN — DEXAMETHASONE SODIUM PHOSPHATE 5 MG: 10 INJECTION INTRAMUSCULAR; INTRAVENOUS at 13:22

## 2022-01-01 RX ADMIN — FUROSEMIDE 20 MG: 20 TABLET ORAL at 10:31

## 2022-01-01 RX ADMIN — METOPROLOL TARTRATE 25 MG: 25 TABLET, FILM COATED ORAL at 10:31

## 2022-01-01 RX ADMIN — POLYVINYL ALCOHOL 1 DROP: 14 SOLUTION/ DROPS OPHTHALMIC at 09:24

## 2022-01-01 RX ADMIN — ALBUTEROL SULFATE 2.5 MG: 2.5 SOLUTION RESPIRATORY (INHALATION) at 21:41

## 2022-01-01 RX ADMIN — ALBUTEROL SULFATE 2.5 MG: 2.5 SOLUTION RESPIRATORY (INHALATION) at 18:08

## 2022-01-01 RX ADMIN — CARBIDOPA AND LEVODOPA 1 TABLET: 25; 100 TABLET ORAL at 20:46

## 2022-01-01 RX ADMIN — SODIUM CHLORIDE, PRESERVATIVE FREE 10 ML: 5 INJECTION INTRAVENOUS at 21:34

## 2022-01-01 RX ADMIN — METOPROLOL TARTRATE 25 MG: 25 TABLET, FILM COATED ORAL at 21:31

## 2022-01-01 RX ADMIN — DIGOXIN 62.5 MCG: 125 TABLET ORAL at 10:32

## 2022-01-01 RX ADMIN — FUROSEMIDE 20 MG: 20 TABLET ORAL at 16:57

## 2022-01-01 RX ADMIN — CARBIDOPA AND LEVODOPA 1 TABLET: 25; 100 TABLET ORAL at 10:31

## 2022-01-01 RX ADMIN — CARBIDOPA AND LEVODOPA 1 TABLET: 25; 100 TABLET ORAL at 09:24

## 2022-01-01 RX ADMIN — ALBUTEROL SULFATE 2.5 MG: 2.5 SOLUTION RESPIRATORY (INHALATION) at 11:10

## 2022-01-01 RX ADMIN — CARBIDOPA AND LEVODOPA 1 TABLET: 25; 100 TABLET ORAL at 20:55

## 2022-01-01 RX ADMIN — FUROSEMIDE 20 MG: 20 TABLET ORAL at 09:24

## 2022-01-01 ASSESSMENT — PAIN DESCRIPTION - PAIN TYPE: TYPE: CHRONIC PAIN

## 2022-01-01 ASSESSMENT — PAIN - FUNCTIONAL ASSESSMENT
PAIN_FUNCTIONAL_ASSESSMENT: PREVENTS OR INTERFERES SOME ACTIVE ACTIVITIES AND ADLS

## 2022-01-01 ASSESSMENT — ENCOUNTER SYMPTOMS
NAUSEA: 0
DIARRHEA: 0
ABDOMINAL PAIN: 0
BACK PAIN: 1
VOMITING: 0
SORE THROAT: 0
SHORTNESS OF BREATH: 0
EYE PAIN: 0
COUGH: 0

## 2022-01-01 ASSESSMENT — PAIN DESCRIPTION - DESCRIPTORS
DESCRIPTORS: ACHING;DISCOMFORT
DESCRIPTORS: ACHING
DESCRIPTORS: ACHING;DULL

## 2022-01-01 ASSESSMENT — PAIN DESCRIPTION - ONSET
ONSET: ON-GOING
ONSET: GRADUAL

## 2022-01-01 ASSESSMENT — PAIN SCALES - GENERAL
PAINLEVEL_OUTOF10: 4
PAINLEVEL_OUTOF10: 6
PAINLEVEL_OUTOF10: 5
PAINLEVEL_OUTOF10: 0
PAINLEVEL_OUTOF10: 0
PAINLEVEL_OUTOF10: 3
PAINLEVEL_OUTOF10: 1
PAINLEVEL_OUTOF10: 0
PAINLEVEL_OUTOF10: 0
PAINLEVEL_OUTOF10: 4

## 2022-01-01 ASSESSMENT — PAIN DESCRIPTION - FREQUENCY
FREQUENCY: INTERMITTENT
FREQUENCY: INTERMITTENT

## 2022-01-01 ASSESSMENT — PAIN DESCRIPTION - LOCATION
LOCATION: GENERALIZED
LOCATION: BACK
LOCATION: BACK

## 2022-01-01 ASSESSMENT — LIFESTYLE VARIABLES
HOW OFTEN DO YOU HAVE A DRINK CONTAINING ALCOHOL: NEVER
HOW MANY STANDARD DRINKS CONTAINING ALCOHOL DO YOU HAVE ON A TYPICAL DAY: PATIENT DECLINED

## 2022-01-01 ASSESSMENT — PAIN DESCRIPTION - ORIENTATION
ORIENTATION: LOWER
ORIENTATION: MID;LOWER

## 2022-05-11 NOTE — PATIENT INSTRUCTIONS
 Continue all your medications at current doses.  I will give you a handout for healthy diet   Restrict sodium intake to less than 2-2.5 g/day. Restrict fluid intake to less than 2.2 L/day. Goal BP is less than 130/80.  If your weight increases by > 2 lbs in a day or >5 lbs in more than a day, take an extra lasix pill.  Please try to exercise for 150 minutes a week.  I will see you back in the office in 6 months. Please call the office at ([phone], option 2) if you have any questions. Solaraze Counseling:  I discussed with the patient the risks of Solaraze including but not limited to erythema, scaling, itching, weeping, crusting, and pain.

## 2022-05-11 NOTE — PROGRESS NOTES
CHIEF COMPLAINT:   Chief Complaint   Patient presents with    6 Month Follow-Up    Atrial Fibrillation        HISTORY OF PRESENT ILLNESS: Patient is a 80 y.o. female who is here for a follow up visit with me. She has a history of prior paroxysmal atrial fibrillation, hypertension, hyperlipidemia, heart failure with preserved ejection fraction. She was admitted to the hospital on 10/15/2021 for cholecystectomy. She was found to be in atrial fibrillation with RVR. Rate control medications were adjusted and the heart rates were better controlled. She was discharged on 10/19/2021. Did have some acute on chronic renal failure as well. Since her last visit with me, she has been doing well. She has not had any further visits to the hospital or emergency room. He was in a nursing home and was recently discharged home. She is complaining of back pain which is a chronic issue for her. She is also complaining of some abdominal pain. She had some chili from [facility] yesterday and feels that it is related to gas. At today's office visit, She  denies any chest pain, shortness of breath, palpitations, dizziness, pedal edema. The patient is capable of activities of daily living. There is dyspnea on more than moderate exertion. There is no orthopnea or PND. She is compliant with medications    Prior Cardiac workup:  TTE 8/2/19:  TTE: EF 55-60%. Mild CLVH. Moderately dilated LA, AF.  Mild MR/AI      Past Medical History:   Diagnosis Date    Anemia     Cholelithiasis     Decreased ambulation status     Gastric reflux     Hypertension     PAF (paroxysmal atrial fibrillation) (HCC)     Parkinson disease (HCC)     Vitamin D deficiency        Allergies   Allergen Reactions    Pcn [Penicillins] Itching       Current Outpatient Medications   Medication Sig Dispense Refill    furosemide (LASIX) 20 MG tablet Take 1 tablet by mouth daily 60 tablet 3    digoxin 62.5 MCG TABS Take 62.5 mcg by mouth daily 30 tablet 3    apixaban (ELIQUIS) 2.5 MG TABS tablet Take by mouth 2 times daily      acetaminophen (APAP EXTRA STRENGTH) 500 MG tablet Take 2 tablets by mouth 2 times daily 120 tablet 0    traMADol (ULTRAM) 50 MG tablet Take 50 mg by mouth every 6 hours as needed for Pain.  metoprolol tartrate (LOPRESSOR) 25 MG tablet Take 1 tablet by mouth 2 times daily 60 tablet 3    Artificial Tear Solution (SOOTHE XP) SOLN Place 1 drop into the left eye 3 times daily      Cholecalciferol (VITAMIN D3) 1000 UNITS TABS Take 1 tablet by mouth every morning       carbidopa-levodopa (SINEMET)  MG per tablet Take 1 tablet by mouth 2 times daily        No current facility-administered medications for this visit. Social History     Socioeconomic History    Marital status:      Spouse name: Not on file    Number of children: Not on file    Years of education: Not on file    Highest education level: Not on file   Occupational History    Not on file   Tobacco Use    Smoking status: Never Smoker    Smokeless tobacco: Never Used   Vaping Use    Vaping Use: Never used   Substance and Sexual Activity    Alcohol use: No    Drug use: No    Sexual activity: Not on file   Other Topics Concern    Not on file   Social History Narrative    Drinks a few sips each meal of tea     Social Determinants of Health     Financial Resource Strain:     Difficulty of Paying Living Expenses: Not on file   Food Insecurity:     Worried About Running Out of Food in the Last Year: Not on file    Jailyn of Food in the Last Year: Not on file   Transportation Needs:     Lack of Transportation (Medical): Not on file    Lack of Transportation (Non-Medical):  Not on file   Physical Activity:     Days of Exercise per Week: Not on file    Minutes of Exercise per Session: Not on file   Stress:     Feeling of Stress : Not on file   Social Connections:     Frequency of Communication with Friends and Family: Not on file    Frequency of Social Gatherings with Friends and Family: Not on file    Attends Orthodoxy Services: Not on file    Active Member of Clubs or Organizations: Not on file    Attends Club or Organization Meetings: Not on file    Marital Status: Not on file   Intimate Partner Violence:     Fear of Current or Ex-Partner: Not on file    Emotionally Abused: Not on file    Physically Abused: Not on file    Sexually Abused: Not on file   Housing Stability:     Unable to Pay for Housing in the Last Year: Not on file    Number of [place] in the Last Year: Not on file    Unstable Housing in the Last Year: Not on file       History reviewed. No pertinent family history. Review of Systems  Constitutional: Negative for fever, malaise/fatigue and weight loss. HENT: Negative for sore throat and tinnitus. Eyes: Negative for blurred vision and double vision. Respiratory: Negative for shortness of breath. Negative for cough and wheezing. Cardiovascular: As mentioned in HPI. Gastrointestinal: Negative for abdominal pain, heartburn, nausea and vomiting. Genitourinary: Negative. Musculoskeletal: Negative for back pain, joint pain and myalgias. Neurological: Negative for dizziness, tremors, loss of consciousness and headaches. Endo/Heme/Allergies: Negative. Psychiatric/Behavioral: Negative for depression and suicidal ideas. Physical Exam   /62   Pulse 67   Resp 14   Ht 5' 1\" (1.549 m)   Wt 88 lb (39.9 kg)   BMI 16.63 kg/m²   Constitutional: Oriented to person, place, and time. Well-developed and well-nourished. No distress. Head: Normocephalic and atraumatic. Eyes: EOM are normal. Pupils are equal, round, and reactive to light. Neck: Normal range of motion. Neck supple. No hepatojugular reflux and no JVD present. Carotid bruit is not present. No tracheal deviation present. No thyromegaly present. Cardiovascular: Normal rate, regular rhythm, normal heart sounds and intact distal pulses.   Exam reveals no gallop and no friction rub. No murmur heard. Pulmonary/Chest: Effort normal and breath sounds normal. No respiratory distress. No wheezes. No rales. No tenderness. Abdominal: Soft. Bowel sounds are normal. No distension and no mass. No tenderness. No rebound and no guarding. Musculoskeletal: Normal range of motion. No edema and no tenderness. Lymphadenopathy:   No cervical adenopathy. Neurological: Alert and oriented to person, place, and time. Skin: Skin is warm and dry. No rash noted. Not diaphoretic. No erythema. Psychiatric: Normal mood and affect. Behavior is normal.     Procedures and Testing  EKG: Done in the office today and reviewed by me. Shows atrial fibrillation that is rate controlled. Nonspecific ST-T changes. Right axis deviation. Low voltage EKG      ASSESSMENT:  1. Persistent atrial fibrillation  2. Secondary hypercoagulable state  3. [id number] of 4 on [address] [place] [address] Road with Eliquis on hold since 10/12/021 for surgery  4. Mild MR/AI on TTE 8/2019 (EF 55-60%)  5. HTN, well controlled 112/68, heart rate 120  6. Chronic kidney disease. 7. Fragility, non ambulatory since kyphoplasty in 8/2021  8. Parkinson's  9. Kyphoplasty in 6/2019 and again 8/2021  10. 8/5/2021 Left inguinal herniorrhaphy with BARD soft mesh  11. Moderate anemia 10.9>>9.6>>8.8>>10.7          Plan:   She appears rate controlled in the office today. Continue digoxin 62.5 mcg daily. Continue Lopressor 25 twice daily. She is on appropriately dosed Eliquis for anticoagulation. Her blood pressure is at goal in clinic today. She was on Aldactone in the past which was discontinued on account of BALDEMAR. Goal for her is less than 140/90. I will check an echocardiogram to assess biventricular function. She is currently on Lasix 20 mg twice daily. She appears stable and euvolemic on examination. Follow-up with me in the office in  6 months.         Danay Hall MD  [facility]) Cardiology     NOTE: This report was transcribed using voice recognition software. Every effort was made to ensure accuracy; however, inadvertent computerized transcription errors may be present.

## 2022-06-06 NOTE — ED PROVIDER NOTES
ATTENDING PROVIDER ATTESTATION:     Serene Tabor presented to the emergency department for evaluation of Back Pain (x 3 days, woke up from a nap and worse pain)   and was initially evaluated by the Medical Resident. See Original ED Note for H&P and ED course above. I have reviewed and discussed the case, including pertinent history (medical, surgical, family and social) and exam findings with the Medical Resident assigned to Serene Sharona. I have personally performed and/or participated in the history, exam, medical decision making, and procedures and agree with all pertinent clinical information and any additional changes or corrections are noted below in my assessment and plan. I have discussed this patient in detail with the resident, and provided the instruction and education,       I have reviewed my findings and recommendations with the assigned Medical Resident, Serene Tabor and members of family present at the time of disposition. I have performed a history and physical examination of this patient and directly supervised the resident caring for this patient      History of Present Illness:    Presents to the ED for lower back pain, beginning a few days ago. The complaint has been constant, severe in severity, and worsened by movement. Lower back pain x 3 days. Aching pain. Worse with movement. Says she awoke from a nap with lower back pain. No fever or chills. No trauma. No bowel or bladder incontinence or saddle anesthesia. No motor weakness.  Hx of compression fractures in the past. Follows with Dr. Issa Deluna of Systems:   A complete review of systems was performed and pertinent positives and negatives are stated within HPI, all other systems reviewed and are negative.    --------------------------------------------- PAST HISTORY ---------------------------------------------  Past Medical History:  has a past medical history of Anemia, Cholelithiasis, Decreased ambulation status, Gastric reflux, Hypertension, PAF (paroxysmal atrial fibrillation) ([facility] [address] Parkinson disease ([facility] [address] and Vitamin D deficiency. Past Surgical History:  has a past surgical history that includes Hip fracture surgery (Right, 11/16/2015); Hysterectomy (2011?); Tonsillectomy; Cataract removal with implant (Left, 12/06/2016); Total knee arthroplasty (Right, 01/16/1997); Kyphosis surgery (01/11/2017); Kyphosis surgery; Kyphosis surgery (N/A, 6/10/2019); hernia repair (Left, 8/5/2019); LAPAROTOMY EXPLORATORY (N/A, 8/5/2019); Kyphosis surgery (N/A, 8/8/2019); and Cholecystectomy, laparoscopic (N/A, 10/15/2021). Social History:  reports that she has never smoked. She has never used smokeless tobacco. She reports that she does not drink alcohol and does not use drugs. Family History: family history is not on file. Unless otherwise noted, family history is non contributory    The patients home medications have been reviewed. Allergies: Pcn [penicillins]         Physical Exam:  Constitutional/General: Alert and oriented x3  Head: Normocephalic and atraumatic  Eyes: PERRL, EOMI, sclera non icteric  ENT: Oropharynx clear, handling secretions  Neck: Supple, full ROM, no stridor, no meningeal signs  Respiratory: Lungs clear to auscultation bilaterally. Not in respiratory distress  Cardiovascular:  Regular rate Equal extremity pulses. GI:  Abdomen Soft, Non tender, Non distended. No rebound, guarding, or rigidity. No pulsatile masses. Musculoskeletal: Moves all extremities x 4. Warm and well perfused,  no clubbing, no cyanosis, no edema. Palpable peripheral pulses  Integument: skin warm and dry. No rashes.    Neurologic: GCS 15, no focal deficits  Psychiatric: Normal Affect      I directly supervised any procedures performed by the resident and was present for the procedure including all critical portions of the procedure        I, Dr. Marisol Joyce, am the primary provider of record    My Medical Decision Making: Fall with L3 compression fx  Pain at this site  50% loss of height   Neurologically intact        1.  Compression fracture of L3 vertebra, initial encounter [facility])            Jo-Ann Oliva MD  06/06/22 2009

## 2022-06-06 NOTE — ED PROVIDER NOTES
HPI   Patient is a 80 y.o. female with a past medical history of parkinsons, afib, compression fx, presenting to the Emergency Department for back pain. History obtained by patient. Symptoms are moderate in severity and persistant since onset. They are improved by nothing and worsened by movement and palpation. Low back pain x 2 days. woke up from nap and it was worse. Difficult to ambulate and move 2/2 pain. No weakness or numbness. No known trauma or falls. No bowel or bladder incontinence. No saddle paresthesia. No fevers. Review of Systems   Constitutional: Negative for chills and fever. HENT: Negative for congestion, ear pain and sore throat. Eyes: Negative for pain and visual disturbance. Respiratory: Negative for cough and shortness of breath. Cardiovascular: Negative for chest pain. Gastrointestinal: Negative for abdominal pain, diarrhea, nausea and vomiting. Genitourinary: Negative for dysuria and frequency. Musculoskeletal: Positive for back pain. Negative for arthralgias. Skin: Negative for rash and wound. Neurological: Negative for dizziness, seizures, weakness, numbness and headaches. All other systems reviewed and are negative. Physical Exam  Vitals and nursing note reviewed. Constitutional:       General: She is not in acute distress. Appearance: Normal appearance. She is well-developed. She is not ill-appearing. HENT:      Head: Normocephalic and atraumatic. Eyes:      Extraocular Movements: Extraocular movements intact. Pupils: Pupils are equal, round, and reactive to light. Cardiovascular:      Rate and Rhythm: Normal rate and regular rhythm. Pulses: Normal pulses. Pulmonary:      Effort: Pulmonary effort is normal. No respiratory distress. Breath sounds: Normal breath sounds. No wheezing or rales. Abdominal:      Palpations: Abdomen is soft. Tenderness: There is no abdominal tenderness. There is no guarding or rebound. Musculoskeletal:         General: Tenderness present. Cervical back: Normal range of motion and neck supple. Comments: Midline lumbar tenderness and paraspinal tenderness. 5/5 strength in legs but ROM limited 2/2 to pain   Skin:     General: Skin is warm and dry. Neurological:      Mental Status: She is alert and oriented to person, place, and time. Cranial Nerves: No cranial nerve deficit. Sensory: No sensory deficit. Motor: No weakness. Coordination: Coordination normal.          Procedures     MDM   Patient presented to the Emergency Department for back pain . They are clinically stable, VSS, non toxic appearing. Reproducible on exam. No focal deficits. No cauda equina or cord compression. Imagining concerning for L3 fracture. No palpable mass, syncope or concern for AAA. evalauted by neurosx, would benefit from admission and possible placement.               ----------------------------------------------- PAST HISTORY --------------------------------------------  Past Medical History:  has a past medical history of Anemia, Cholelithiasis, Decreased ambulation status, Gastric reflux, Hypertension, PAF (paroxysmal atrial fibrillation) ([facility] [address] Parkinson disease ([facility] [address] and Vitamin D deficiency. Past Surgical History:  has a past surgical history that includes Hip fracture surgery (Right, 11/16/2015); Hysterectomy (2011?); Tonsillectomy; Cataract removal with implant (Left, 12/06/2016); Total knee arthroplasty (Right, 01/16/1997); Kyphosis surgery (01/11/2017); Kyphosis surgery; Kyphosis surgery (N/A, 6/10/2019); hernia repair (Left, 8/5/2019); LAPAROTOMY EXPLORATORY (N/A, 8/5/2019); Kyphosis surgery (N/A, 8/8/2019); and Cholecystectomy, laparoscopic (N/A, 10/15/2021). Social History:  reports that she has never smoked. She has never used smokeless tobacco. She reports that she does not drink alcohol and does not use drugs. Family History: family history is not on file. Urobilinogen, Urine 0.2 <2.0 E.U./dL    Nitrite, Urine Negative Negative    Leukocyte Esterase, Urine TRACE (A) Negative   Microscopic Urinalysis   Result Value Ref Range    WBC, UA 0-1 0 - 5 /HPF    RBC, UA 0-1 0 - 2 /HPF    Bacteria, UA RARE (A) None Seen /HPF       RADIOLOGY:    All Radiology results interpreted by Radiologist unless otherwise noted. CT THORACIC SPINE WO CONTRAST   Final Result   CT OF THE THORACIC SPINE:      1. No acute fracture. 2. Chronic compression fracture deformities in the lower thoracic spine,   resulting in an exaggerated thoracic kyphosis. Multilevel kyphoplasty   changes. 3. Markedly demineralized bones. CT OF THE LUMBAR SPINE:      1. Age-indeterminate, possibly acute or subacute compression fracture   deformity of the L3 vertebral body with approximately 50% loss of height. 2. Chronic compression fracture deformities at L1, L2 and L4.   3. Mild progressive loss of height in the L1 vertebral body since the MRI   from 08/02/2021. RECOMMENDATIONS:   Unavailable         CT LUMBAR SPINE WO CONTRAST   Final Result   CT OF THE THORACIC SPINE:      1. No acute fracture. 2. Chronic compression fracture deformities in the lower thoracic spine,   resulting in an exaggerated thoracic kyphosis. Multilevel kyphoplasty   changes. 3. Markedly demineralized bones. CT OF THE LUMBAR SPINE:      1. Age-indeterminate, possibly acute or subacute compression fracture   deformity of the L3 vertebral body with approximately 50% loss of height. 2. Chronic compression fracture deformities at L1, L2 and L4.   3. Mild progressive loss of height in the L1 vertebral body since the MRI   from 08/02/2021.       RECOMMENDATIONS:   Unavailable         MRI THORACIC SPINE WO CONTRAST    (Results Pending)   MRI LUMBAR SPINE WO CONTRAST    (Results Pending)           ---------------------------- NURSING NOTES AND VITALS REVIEWED -------------------------   The nursing notes within the ED encounter and vital signs as below have been reviewed.    BP (!) 144/57   Pulse 71   Temp 97.8 °F (36.6 °C) (Temporal)   Resp 16   Wt 88 lb (39.9 kg)   SpO2 94%   BMI 16.63 kg/m²   Oxygen Saturation Interpretation: Normal      ------------------------------------------PROGRESS NOTES -------------------------------------------    ED COURSE MEDICATIONS:                Medications   polyvinyl alcohol (LIQUIFILM TEARS) 1.4 % ophthalmic solution 1 drop (1 drop Left Eye Not Given 6/6/22 2106)   carbidopa-levodopa (SINEMET)  MG per tablet 1 tablet (1 tablet Oral Given 6/6/22 2046)   digoxin (LANOXIN) tablet 62.5 mcg (has no administration in time range)   furosemide (LASIX) tablet 20 mg (has no administration in time range)   metoprolol tartrate (LOPRESSOR) tablet 25 mg (25 mg Oral Given 6/6/22 2046)   traMADol (ULTRAM) tablet 50 mg (has no administration in time range)   sodium chloride flush 0.9 % injection 10 mL (10 mLs IntraVENous Given 6/6/22 2052)   sodium chloride flush 0.9 % injection 10 mL (has no administration in time range)   0.9 % sodium chloride infusion (has no administration in time range)   potassium chloride (KLOR-CON M) extended release tablet 40 mEq (has no administration in time range)     Or   potassium bicarb-citric acid (EFFER-K) effervescent tablet 40 mEq (has no administration in time range)     Or   potassium chloride 10 mEq/100 mL IVPB (Peripheral Line) (has no administration in time range)   ondansetron (ZOFRAN-ODT) disintegrating tablet 4 mg (has no administration in time range)     Or   ondansetron (ZOFRAN) injection 4 mg (has no administration in time range)   magnesium hydroxide (MILK OF MAGNESIA) 400 MG/5ML suspension 30 mL (has no administration in time range)   acetaminophen (TYLENOL) tablet 650 mg (has no administration in time range)     Or   acetaminophen (TYLENOL) suppository 650 mg (has no administration in time range)       CONSULTATIONS:            neurosx   [facility] medicine for admission. PROCEDURES:            none. COUNSELING:   I have spoken with the daughter, son and patient and discussed todays results, in addition to providing specific details for the plan of care and counseling regarding the diagnosis and prognosis.     --------------------------------------- IMPRESSION & DISPOSITION --------------------------------     IMPRESSION(s):  1. Compression fracture of L3 vertebra, initial encounter [facility])        This patient's ED course included: a personal history and physicial examination and multiple bedside re-evaluations    This patient has been closely monitored during their ED course. DISPOSITION:  Disposition: Admit to med/surg floor. Patient condition is stable. END OF PROVIDER NOTE.            Yarelis Phillips DO  Resident  06/06/22 [id number]

## 2022-06-06 NOTE — ED NOTES
Pt. Given small snack, repositioned in bed, and warm blanket applied.       Yousuf Kilgore RN  06/06/22 4875

## 2022-06-06 NOTE — LETTER
[place] Medicaid  CERTIFICATION OF NECESSITY  FOR NON-EMERGENCY TRANSPORTATION   BY GROUND AMBULANCE      Individual Information   1. Name: Michel Gamboa 2. [place] Billing Number:    3. Address: [address] [place] [address]      Transportation Provider Information   4. Provider Name:    5. [place] Provider Number: National Provider Identifier (NPI):     Certification  7. Criteria:  During transport, this individual requires:  [x] Medical treatment or continuous     supervision by an EMT. [] The administration or regulation of oxygen by another person. [] Supervised protective restraint. 8. Period Beginning Date: 2022     9. Length  [x] Not more than 5 day(s)  [] One Year     Additional Information Relevant to Certification   10. Comments or Explanations, If Necessary or Appropriate        Certifying Practitioner Information   11. Name of Practitioner: Dr. Irish Cevallos   12. [place] Medicaid Provider Number, If Applicable:  [address] Provider Identifier (NPI):      Signature Information   14. Date of Signature: 2022   15. Name of Person Signing: Martin Santa RN       16. Signature and Professional Designation:Electronically signed by Martin Santa RN on 2022 at 3:00 PM       ODM 62043  Rev. 2015              [facility] [address] Encounter Date/Time: 2022 [facility] Account: [de-identified]    MRN: [id number]    Patient: Michel Gamboa    Contact Serial #: [id number]      ENCOUNTER          Patient Class: I Private Enc?   No Unit RM BD: SEYZ 8SE [facility] 8506/8506-B   Hospital Service: MED   Encounter DX: Lumbar compression fract*   ADM Provider: La Nena Marquez DO   Procedure:     ATT Provider: La Nena Marquez DO   REF Provider:        Admission DX: Lumbar compression fracture, closed, initial encounter [facility]) and DX codes: S32.000A      PATIENT                 Name: Michel Gamboa : 1928 (89 yrs)   Address: [address] [place] [address] Sex: Female   City[place] [address]         Marital Status:    Employer: [employer]          Episcopalian: [place]   Primary Care Provider: Mayra Kohli MD         Primary Phone: [phone]   EMERGENCY CONTACT   Contact Name Legal Guardian? Relationship to Patient Home Phone Work Phone   1. Farshad Ghotra  2. Lula Vang    No Child  Child [phone]              GUARANTOR            Guarantor: Dione Qamar     : 1928   Address: [address] [place] [address] Sex: [place]     [place] [address]     Relation to Patient: Self       Home Phone: [phone]   Guarantor ID: [id number]       Work Phone:     Guarantor Employer: [employer]          Status: RETIRED      COVERAGE        PRIMARY INSURANCE   Payor: MEDICARE Plan: RAILROAD MEDICARE   Payor Address: [address] [address],  [facility], [facility] [address] [address]       Group Number:   Insurance Type: INDEMNITY   Subscriber Name: Zainab Ramos : 1928   Subscriber ID: [id number] Pat. Rel. to Sub: Self   SECONDARY INSURANCE   Payor: [employer] Plan: [employer] MEDICA*   Payor Address:  [id number], [address] [place] [address]          Group Number: [id number] Insurance Type: [address] Name: Miky Tello Subscriber : 1921   Subscriber ID: [id number] Pat.  Rel. to Sub: SPOUSE           CSN: [id number]

## 2022-06-07 NOTE — CONSULTS
[address]                  [address] [place],  [place]                                  CONSULTATION    PATIENT NAME: Juan R Dunn                    :        1928  MED REC NO:   [id number]                            ROOM:       5207  ACCOUNT NO:   [de-identified]                           ADMIT DATE: 2022  PROVIDER:     Jose Kimball MD    CONSULT DATE:  2022    REASON FOR CONSULTATION:  L3 compression fracture. HISTORY OF PRESENT ILLNESS:  The patient is a 80-year-old lady who  presented to emergency room with back pain for approximately three days. The pain is progressively worse to the point where she is unable to  ambulate. She was subsequently brought by her family to emergency room  where she had a CT scan that showed a L3 compression fracture of  indeterminate age. The pain is described as a sharp stabbing pain. It  is made worse with activity and better with rest.  It does not radiate  to her legs. She denies any new numbness, tingling or weakness or loss  of control of bowel or bladder function. PAST MEDICAL HISTORY:  Positive for anemia, cholelithiasis, gastric  reflux, hypertension, atrial fibrillation, Parkinson's disease, vitamin  D deficiency. PAST SURGICAL HISTORY:  Positive for hip fracture surgery, hysterectomy,  tonsillectomy, kyphoplasty, hernia repair, ex-lap, cholecystectomy. SOCIAL HISTORY:  Negative for tobacco use and she does not consume any  alcoholic beverages. FAMILY HISTORY:  Noncontributory. ALLERGIES:  Include PENICILLIN. HOME MEDICATIONS:  Include Eliquis, Lanoxin, Lasix, Sinemet. REVIEW OF SYSTEMS:  HEENT:  Negative for headache, double vision, or  blurry vision. CARDIOVASCULAR:  Positive for irregular heart rate. RESPIRATORY:  Negative for shortness of breath, asthma, bronchitis or  pneumonia.   GASTROINTESTINAL:  Negative for heartburn, nausea, vomiting,  diarrhea, or [id number]  Job#: [id number]     Doc#: [id number]    CC:

## 2022-06-07 NOTE — PROGRESS NOTES
OCCUPATIONAL THERAPY TREATMENT NOTE    [facility] TREATMENT NOTE      Date:2022  Patient Name: Madeline Wilder  MRN: [id number]  : 1928  Room: [address] [place] [address]-A       OT order received. Chart has been reviewed. OT evaluation will be on hold due to awaiting NS POC following MRI. Will continue to follow and complete evaluation at later time. Thank you,  Merline Bow.  Yasmine, OTR/L   License #  [id number]

## 2022-06-07 NOTE — CARE COORDINATION
Presented to [facility] with c/o back pain. Neuro surgery consult. For MRI  Of thoracic and lumbar spine. Will await  PT/OT evals until MRI results are read . Met with patient's daughter at bedside and patient lives  in a single level home and her son lives in the basement. Her other son lives across the street. Patient uses a walker. Patients PCP is Dr. Femi Herrera and she uses [facility] on [address] [facility]. She has h/o with [facility] and [address] Will need  therpay evals before definite discharge plans can be made. Daughter would like [facility] at [address] . Tentative referral made to [facility]. Will await acceptance and therapy evals. CM/SW will continue to follow. Per [facility] they can accept once evals and precert done. Will need HENS Covid and tranport form kaushik when final transition plans decided after therapy sees.

## 2022-06-07 NOTE — H&P
Hospital Medicine History & Physical      PCP: Treva Valdovinos MD    Date of Admission: 6/6/2022    Date of Service: .June 7, 2022    Chief Complaint:  *BACK PAIN      History Of Present Illness:     80 y.o. female presented with BACK PAIN, SON SAYS MOM DID NOT FALL, HOWEVER SHE MOVED OR TURNED OVER IN BED AND FELT SEVERE BACK ALMENDAREZ, FOUND TO HAVE COMPRESSION FRACTURES L3 compression fx    Past Medical History:          Diagnosis Date    Anemia     Cholelithiasis     Decreased ambulation status     Gastric reflux     Hypertension     PAF (paroxysmal atrial fibrillation) (HCC)     Parkinson disease ([address]     Vitamin D deficiency        Past Surgical History:          Procedure Laterality Date    CATARACT REMOVAL WITH IMPLANT Left 12/06/2016    CHOLECYSTECTOMY, LAPAROSCOPIC N/A 10/15/2021    LAPAROSCOPIC ROBOTIC ASSISTED CHOLECYSTECTOMY performed by Graham Ortiz MD at [facility] Left 8/5/2019    LEFT INGUINAL HERNIA REPAIR performed by Silvana Scherer MD at [address] [address] Right 11/16/2015    ORIF left hip inter & subtrochanteric fx. w/long gamma nail. Herman Gutierrez MD    HYSTERECTOMY  2011? robotic    KYPHOSIS SURGERY  01/11/2017    T8    KYPHOSIS SURGERY      KYPHOSIS SURGERY N/A 6/10/2019    T11 VERTEBRAL BODY BIOPSY & KYPHOPLASTY performed by Luz Maria Silva MD at [facility] 8/8/2019    KYPHOPLASTY T11, L2 performed by Luz Maria Silva MD at [facility] 8/5/2019    POSSIBLE LAPAROTOMY  POSSIBLE BOWEL RESECTION performed by Silvana Scherer MD at [facility] Right 01/16/1997    Right TKA. Julian Lugo. Ricky Lombardi MD       Medications Prior to Admission:      Prior to Admission medications    Medication Sig Start Date End Date Taking?  Authorizing Provider doxycycline hyclate (VIBRAMYCIN) 100 mg capsule Take 100 mg by mouth 2 times daily   Yes Historical Provider, MD   furosemide (LASIX) 20 MG tablet Take 1 tablet by mouth daily 5/11/22   Estela Callaway MD   digoxin 62.5 MCG TABS Take 62.5 mcg by mouth daily 10/20/21   Raisa Salamanca MD   apixaban (ELIQUIS) 2.5 MG TABS tablet Take by mouth 2 times daily    Historical Provider, MD   acetaminophen (APAP EXTRA STRENGTH) 500 MG tablet Take 2 tablets by mouth 2 times daily 8/31/20   Kieran Nielson DO   traMADol (ULTRAM) 50 MG tablet Take 50 mg by mouth every 6 hours as needed for Pain. Historical Provider, MD   metoprolol tartrate (LOPRESSOR) 25 MG tablet Take 1 tablet by mouth 2 times daily 8/9/19   Raffaele Whelan MD   Artificial Tear Solution (SOOTHE XP) SOLN Place 1 drop into the left eye 3 times daily    Historical Provider, MD   Cholecalciferol (VITAMIN D3) 1000 UNITS TABS Take 1 tablet by mouth every morning     Historical Provider, MD   carbidopa-levodopa (SINEMET)  MG per tablet Take 1 tablet by mouth 2 times daily     Historical Provider, MD       Allergies:  Pcn [penicillins]    Social History:      The patient currently lives WITH SON*    TOBACCO:   reports that she has never smoked. She has never used smokeless tobacco.  ETOH:   reports no history of alcohol use. Family History:      **Reviewed in detail and negative for DM, CAD, Cancer, CVA. Positive as follows:    History reviewed. No pertinent family history. REVIEW OF SYSTEMS:   Pertinent positives as noted in the HPI. All other systems reviewed and negative. PHYSICAL EXAM:    BP (!) 129/49   Pulse 61   Temp 97.5 °F (36.4 °C) (Temporal)   Resp 18   Wt 88 lb (39.9 kg)   SpO2 97%   BMI 16.63 kg/m²     General appearance:  No apparent distress, appears stated age and cooperative. HEENT:  Normal cephalic, atraumatic without obvious deformity. Pupils equal, round, and reactive to light. Extra ocular muscles intact. Conjunctivae/corneas clear. Neck: Supple, with full range of motion. No jugular venous distention. Trachea midline. Respiratory:  Normal respiratory effort. RHONCHI NOTED BILATERALLY   Cardiovascular:  Regular rate and rhythm   Abdomen: Soft, non-tender, non-distended with normal bowel sounds. Musculoskeletal:  No clubbing, cyanosis POS  edema bilaterally. Skin: Skin color, texture, turgor normal.  No rashes or lesions. Neurologic:  Neurovascularly intact without any focal sensory/motor deficits. Cranial nerves: II-XII intact, grossly non-focal.  Psychiatric:  Alert and oriented, thought content appropriate, normal insight        Labs:     Recent Labs     06/06/22  1228 06/07/22  0538   WBC 9.9 7.0   HGB 9.9* 9.4*   HCT 31.0* 30.0*    198     Recent Labs     06/06/22  1228 06/07/22  0538    138   K 4.5 3.8    100   CO2 27 24   BUN 28* 23   CREATININE 1.0 0.9   CALCIUM 9.2 9.2     No results for input(s): AST, ALT, BILIDIR, BILITOT, ALKPHOS in the last 72 hours. No results for input(s): INR in the last 72 hours. No results for input(s): Arlette Basques in the last 72 hours. Urinalysis:      Lab Results   Component Value Date    NITRU Negative 06/06/2022    WBCUA 0-1 06/06/2022    BACTERIA RARE 06/06/2022    RBCUA 0-1 06/06/2022    BLOODU TRACE-INTACT 06/06/2022    SPECGRAV 1.010 06/06/2022    GLUCOSEU Negative 06/06/2022       Radiology:     CXR: I have reviewed the CXR with the following interpretation:     MRI LUMBAR SPINE WO CONTRAST   Final Result   1. Acute to subacute compression fracture deformities of L1, L3 and L4. No   significant retropulsion. 2. Multiple remote compression fracture deformities and cement augmentation   changes throughout the thoracic spine. MRI THORACIC SPINE WO CONTRAST   Final Result   1. Acute to subacute compression fracture deformities of L1, L3 and L4. No   significant retropulsion.    2. Multiple remote compression fracture deformities and cement augmentation   changes throughout the thoracic spine. CT THORACIC SPINE WO CONTRAST   Final Result   CT OF THE THORACIC SPINE:      1. No acute fracture. 2. Chronic compression fracture deformities in the lower thoracic spine,   resulting in an exaggerated thoracic kyphosis. Multilevel kyphoplasty   changes. 3. Markedly demineralized bones. CT OF THE LUMBAR SPINE:      1. Age-indeterminate, possibly acute or subacute compression fracture   deformity of the L3 vertebral body with approximately 50% loss of height. 2. Chronic compression fracture deformities at L1, L2 and L4.   3. Mild progressive loss of height in the L1 vertebral body since the MRI   from 08/02/2021. RECOMMENDATIONS:   Unavailable         CT LUMBAR SPINE WO CONTRAST   Final Result   CT OF THE THORACIC SPINE:      1. No acute fracture. 2. Chronic compression fracture deformities in the lower thoracic spine,   resulting in an exaggerated thoracic kyphosis. Multilevel kyphoplasty   changes. 3. Markedly demineralized bones. CT OF THE LUMBAR SPINE:      1. Age-indeterminate, possibly acute or subacute compression fracture   deformity of the L3 vertebral body with approximately 50% loss of height. 2. Chronic compression fracture deformities at L1, L2 and L4.   3. Mild progressive loss of height in the L1 vertebral body since the MRI   from 08/02/2021. RECOMMENDATIONS:   Unavailable         US DUP LOWER EXTREMITIES BILATERAL VENOUS    (Results Pending)       ASSESSMENT:    Active Hospital Problems    Diagnosis Date Noted    Compression fracture of third lumbar vertebra ([facility] [S32.030A]      Priority: Medium    Lumbar compression fracture, closed, initial encounter ([facility] [S32.000A] 08/07/2019   MOIST COUGH  EDEMA OF LEGS   PARKINSONS   HTN   PAF      PLAN:  CXR  NEUR SURGERY CONSULT  SINEMET  LOPRESSOR DIG        DVT Prophylaxis: SCD  Diet: ADULT DIET; Regular;  Low Sodium (2 gm)  Diet NPO  Code Status: DNR-CCA    PT/OT Eval Status: *ORDERED    Dispo - *SNF    Electronically signed by Buster Aggarwal DO on 6/7/2022 at 3:25 PM Osvaldo Hoffman       Thank you Ayah Du MD for the opportunity to be involved in this patient's care.  If you have any questions or concerns please feel free to contact me at [phone]

## 2022-06-07 NOTE — PLAN OF CARE
Patient's chart updated to reflect:      . - HF care plan, HF education points and HF discharge instructions.  -Orders: 2 gram sodium diet, daily weights, I/O.  -PCP and/or Cardiologist appointment to be scheduled within 7 days of hospital discharge.  -History of HF, not primary admission Dx.   Patient admitted for treatment of    Compression fracture of L3 vertebra       Yumiko Yousif RN RN, BSN  Heart Failure Navigator

## 2022-06-07 NOTE — PROGRESS NOTES
Physical Therapy    Date: 2022       Patient Name: Cassia Giron  : 1928      MRN: [id number]    PT order received. Chart has been reviewed. PT evaluation will be on hold due to awaiting NS POC following mri. Will continue to follow and complete evaluation at later time.      Lianet Cortes, PT

## 2022-06-08 NOTE — PROGRESS NOTES
Occupational Therapy  Date:2022  Patient Name: Thuy Reyes  MRN: [id number]  : 1928  Room: [address] [place] [address]-A     OT order received and appreciated. Chart reviewed. Hold OT evaluation, pt for possible kyphoplasty today . Will follow.     Yuko Rea OTR/L #[id number]

## 2022-06-08 NOTE — CARE COORDINATION
6/8/22 Update CM Note; Patient is NPO for kyphoplast today at 1pm. PT/OT evals are pending. [facility] rehab is following and can accept. Patient is with poor pain control and unable to tolerate brace. She is on strict bedrest. U/S legs pending. Stools are formed and do not meet criteria for cdiff culture to be sent. Order cancelled. Will follow post procedure.  Electronically signed by Lincoln Bingham RN CM on 6/8/2022 at 10:16 AM

## 2022-06-08 NOTE — PROGRESS NOTES
Cardio consult sent to [facility], [address] via [facility]

## 2022-06-08 NOTE — ANESTHESIA PRE PROCEDURE
Department of Anesthesiology  Preprocedure Note       Name:  Vee Hargrove   Age:  80 y.o.  :  1928                                          MRN:  [id number]         Date:  2022      Surgeon: Cesar Cohn):  Lori Thayer MD    Procedure: Procedure(s):  L1, L3, L4 KYPHOPLASTY-NEEDS 2C-ARM    Medications prior to admission:   Prior to Admission medications    Medication Sig Start Date End Date Taking? Authorizing Provider   traMADol (ULTRAM) 50 MG tablet Take 1 tablet by mouth every 6 hours as needed for Pain for up to 7 days. 6/8/22 6/15/22  NICOLE Finley   doxycycline hyclate (VIBRAMYCIN) 100 mg capsule Take 100 mg by mouth 2 times daily    Historical Provider, MD   furosemide (LASIX) 20 MG tablet Take 1 tablet by mouth daily 22   Molly Meyer MD   digoxin 62.5 MCG TABS Take 62.5 mcg by mouth daily 10/20/21   Lori Helm MD   apixaban (ELIQUIS) 2.5 MG TABS tablet Take by mouth 2 times daily    Historical Provider, MD   acetaminophen (APAP EXTRA STRENGTH) 500 MG tablet Take 2 tablets by mouth 2 times daily 20   Mono English DO   metoprolol tartrate (LOPRESSOR) 25 MG tablet Take 1 tablet by mouth 2 times daily 19   Abbey Meyer MD   Artificial Tear Solution (SOOTHE XP) SOLN Place 1 drop into the left eye 3 times daily    Historical Provider, MD   Cholecalciferol (VITAMIN D3) 1000 UNITS TABS Take 1 tablet by mouth every morning     Historical Provider, MD   carbidopa-levodopa (SINEMET)  MG per tablet Take 1 tablet by mouth 2 times daily     Historical Provider, MD       Current medications:    No current facility-administered medications for this visit. Current Outpatient Medications   Medication Sig Dispense Refill    traMADol (ULTRAM) 50 MG tablet Take 1 tablet by mouth every 6 hours as needed for Pain for up to 7 days.  28 tablet 0     Facility-Administered Medications Ordered in Other Visits   Medication Dose Route Frequency Provider Last Rate Last Admin  fentaNYL (SUBLIMAZE) injection   IntraVENous PRN JERMAN Ayala CRNA   50 mcg at 06/08/22 1311    propofol injection   IntraVENous PRN JERMAN Ayala CRNA   50 mg at 06/08/22 1311    rocuronium (ZEMURON) injection   IntraVENous PRN JERMAN Ayala CRNA   25 mg at 06/08/22 1311    0.9 % sodium chloride infusion   IntraVENous Continuous PRN JERMAN Ayala CRNA   New Bag at 06/08/22 1311    docusate sodium (COLACE) capsule 100 mg  100 mg Oral Nightly Geovanni Khan DO        pantoprazole (PROTONIX) tablet 40 mg  40 mg Oral QAM AC Geovanni Khan DO        trimethobenzamide Marylene Second) injection 200 mg  200 mg IntraMUSCular Q6H PRN Geovanni Khan DO        vancomycin (VANCOCIN) 750 mg in dextrose 5 % 250 mL IVPB  750 mg IntraVENous On Call to NICOLE Clark        albuterol (PROVENTIL) nebulizer solution 2.5 mg  2.5 mg Nebulization 4x daily Charley Anderson DO   2.5 mg at 06/08/22 0736    polyvinyl alcohol (LIQUIFILM TEARS) 1.4 % ophthalmic solution 1 drop  1 drop Left Eye TID NICOLE Lundberg   1 drop at 06/07/22 2055    carbidopa-levodopa (SINEMET)  MG per tablet 1 tablet  1 tablet Oral BID NICOLE Lundberg   1 tablet at 06/07/22 2055    digoxin (LANOXIN) tablet 62.5 mcg  62.5 mcg Oral Daily NICOLE Lundberg   62.5 mcg at 06/07/22 1032    furosemide (LASIX) tablet 20 mg  20 mg Oral Daily NICOLE Lundberg   20 mg at 06/07/22 1031    metoprolol tartrate (LOPRESSOR) tablet 25 mg  25 mg Oral BID NICOLE Lundberg   25 mg at 06/07/22 2055    traMADol (ULTRAM) tablet 50 mg  50 mg Oral Q6H PRN NICOLE Lundberg        sodium chloride flush 0.9 % injection 10 mL  10 mL IntraVENous 2 times per day NICOLE Lundberg   10 mL at 06/07/22 2055    sodium chloride flush 0.9 % injection 10 mL  10 mL IntraVENous PRN Epimenio Deck, PA        0.9 % sodium chloride infusion   IntraVENous PRN Epimenio Deck, PA        potassium chloride [facility] M) extended release tablet 40 mEq  40 mEq Oral PRN NICOLE Harris        Or    potassium bicarb-citric acid (EFFER-K) effervescent tablet 40 mEq  40 mEq Oral PRN NICOLE Harris        Or    potassium chloride 10 mEq/100 mL IVPB (Peripheral Line)  10 mEq IntraVENous PRN NICOLE Harris        ondansetron (ZOFRAN-ODT) disintegrating tablet 4 mg  4 mg Oral Q8H PRN NICOLE Harris        Or    ondansetron [facility]) injection 4 mg  4 mg IntraVENous Q6H PRN NICOLE Harris        magnesium hydroxide (MILK OF MAGNESIA) 400 MG/5ML suspension 30 mL  30 mL Oral Daily PRN NICOLE Harris        acetaminophen (TYLENOL) tablet 650 mg  650 mg Oral Q6H PRN NICOLE Harris        Or    acetaminophen (TYLENOL) suppository 650 mg  650 mg Rectal Q6H PRN NICOLE Harris           Allergies:     Allergies   Allergen Reactions    Pcn [Penicillins] Itching       Problem List:    Patient Active Problem List   Diagnosis Code    Subtrochanteric fracture of right femur ([facility] [address] S72.21XA    Parkinson's disease ([facility] G20    Renal insufficiency N28.9    Hypertension I10    Abdominal pain R10.9    Chronic bilateral thoracic back pain M54.6, G89.29    Thoracic compression fracture, closed, initial encounter ([facility] [address] S22.000A    SBO (small bowel obstruction) ([facility] [address] K56.609    Red blood cell antibody positive R76.8    Moderate protein-calorie malnutrition ([address] E44.0    Compression fracture of L2 vertebra ([address] S32.020A    Lumbar compression fracture, closed, initial encounter ([facility] [address] S32.000A    Paroxysmal atrial fibrillation (HCC) I48.0    Low back pain M54.50    Primary osteoarthritis of left knee M17.12    Back pain M54.9    H/O cholelithiasis Z87.19    Atrial fibrillation with RVR (HCC) I48.91    Chronic heart failure with preserved ejection fraction (HCC) I50.32    Compression fracture of third lumbar vertebra ([facility] S32.030A       Past Medical History:        Diagnosis Date    Anemia     Cholelithiasis     Decreased ambulation status     Gastric reflux     Hypertension     PAF (paroxysmal atrial fibrillation) (HCC)     Parkinson disease ([facility]     Vitamin D deficiency        Past Surgical History:        Procedure Laterality Date    CATARACT EXTRACTION W/  INTRAOCULAR LENS IMPLANT Left 12/06/2016    CHOLECYSTECTOMY, LAPAROSCOPIC N/A 10/15/2021    LAPAROSCOPIC ROBOTIC ASSISTED CHOLECYSTECTOMY performed by Bernice Koo MD at [facility]. Left 8/5/2019    LEFT INGUINAL HERNIA REPAIR performed by Kole Mendiola MD at [address] [facility] 9 Right 11/16/2015    ORIF left hip inter & subtrochanteric fx. w/long gamma nail. Curry Ariza MD    HYSTERECTOMY  2011? robotic    KYPHOSIS SURGERY  01/11/2017    T8    KYPHOSIS SURGERY      KYPHOSIS SURGERY N/A 6/10/2019    T11 VERTEBRAL BODY BIOPSY & KYPHOPLASTY performed by Slava Pineda MD at [facility] 8/8/2019    KYPHOPLASTY T11, L2 performed by Slava Pineda MD at [facility] 8/5/2019    POSSIBLE LAPAROTOMY  POSSIBLE BOWEL RESECTION performed by Kole Mendiola MD at [address] Right 01/16/1997    Right TKA. Trudee Beards. Bettye Robertson MD       Social History:    Social History     Tobacco Use    Smoking status: Never Smoker    Smokeless tobacco: Never Used   Substance Use Topics    Alcohol use: No                                Counseling given: Not Answered      Vital Signs (Current): There were no vitals filed for this visit.                                            BP Readings from Last 3 Encounters:   06/08/22 133/66   05/11/22 102/62   11/10/21 128/60       NPO Status:                                                                                 BMI:   Wt Readings from Last 3 Encounters:   06/06/22 88 lb (39.9 kg)   05/11/22 88 lb (39.9 kg)   11/10/21 86 lb 6.4 oz (39.2 kg)     There is no height or weight on file to calculate BMI.    CBC:   Lab Results   Component Value Date    WBC 7.0 06/07/2022    RBC 2.69 06/07/2022    HGB 9.4 06/07/2022    HCT 30.0 06/07/2022    .5 06/07/2022    RDW 14.8 06/07/2022     06/07/2022       CMP:   Lab Results   Component Value Date     06/08/2022    K 3.7 06/08/2022    K 3.8 06/07/2022     06/08/2022    CO2 26 06/08/2022    BUN 20 06/08/2022    CREATININE 0.8 06/08/2022    GFRAA >60 06/08/2022    LABGLOM >60 06/08/2022    GLUCOSE 73 06/08/2022    PROT 6.7 10/17/2021    CALCIUM 9.1 06/08/2022    BILITOT 0.5 10/17/2021    ALKPHOS 112 10/17/2021    AST 34 10/17/2021    ALT <5 10/17/2021       POC Tests: No results for input(s): POCGLU, POCNA, POCK, POCCL, POCBUN, POCHEMO, POCHCT in the last 72 hours.     Coags:   Lab Results   Component Value Date    PROTIME 13.2 06/07/2022    INR 1.2 06/07/2022    APTT 66.1 07/16/2021       HCG (If Applicable): No results found for: PREGTESTUR, PREGSERUM, HCG, HCGQUANT     ABGs: No results found for: PHART, PO2ART, AMP1PYN, YBB1ZKN, BEART, Z8QBJGQE     Type & Screen (If Applicable):  No results found for: LABABO, LABRH    Drug/Infectious Status (If Applicable):  No results found for: HIV, HEPCAB    COVID-19 Screening (If Applicable):   Lab Results   Component Value Date    COVID19 Detected 01/18/2022    COVID19 Not Detected 10/25/2021           Anesthesia Evaluation  Patient summary reviewed and Nursing notes reviewed no history of anesthetic complications:   Airway: Mallampati: II  TM distance: >3 FB   Neck ROM: limited  Mouth opening: > = 3 FB   Dental:    (+) edentulous      Pulmonary:Negative Pulmonary ROS breath sounds clear to auscultation                             Cardiovascular:  Exercise tolerance: poor (<4 METS),   (+) hypertension:, dysrhythmias: atrial fibrillation,       ECG reviewed  Rhythm: irregular  Rate: normal  Echocardiogram reviewed               ROS comment: Echo Aug 2019    Summary   Mild concentric left ventricular hypertrophy. Ejection fraction is visually estimated at 55-65%. The left atrium is moderately dilated. Atrial fibrillation   Mild mitral regurgitation   The aortic valve appears mildly sclerotic. Mild aortic regurgitation. No pulmonary hypertension   No pericardial effusion     Neuro/Psych:   (+) neuromuscular disease:,              ROS comment: Parkinson's disease    Chronic pain GI/Hepatic/Renal:   (+) renal disease: CRI,           Endo/Other:    (+) blood dyscrasia: anemia, arthritis: OA., .                 Abdominal:             Vascular: negative vascular ROS. Other Findings:             Anesthesia Plan      general     ASA 3       Induction: intravenous. MIPS: Postoperative opioids intended and Prophylactic antiemetics administered. Anesthetic plan and risks discussed with patient and child/children. Plan discussed with attending.                       JERMAN Stark - CRNA   6/8/2022

## 2022-06-08 NOTE — PROGRESS NOTES
Report called to [facility] because of patiens A fib RVR intraop wants Dr Pal Holder updated , Dr Pal Holder called left message to return call for patient update , awaiting call back

## 2022-06-08 NOTE — ANESTHESIA PRE PROCEDURE
Department of Anesthesiology  Preprocedure Note       Name:  Biaml Aranda   Age:  80 y.o.  :  1928                                          MRN:  [id number]         Date:  2022      Surgeon: Angeles Salomon):  Shelby Henry MD    Procedure: Procedure(s):  L1, L3, L4 KYPHOPLASTY-NEEDS 2C-ARM    Medications prior to admission:   Prior to Admission medications    Medication Sig Start Date End Date Taking? Authorizing Provider   traMADol (ULTRAM) 50 MG tablet Take 1 tablet by mouth every 6 hours as needed for Pain for up to 7 days.  6/8/22 6/15/22 Yes NICOLE Riley   doxycycline hyclate (VIBRAMYCIN) 100 mg capsule Take 100 mg by mouth 2 times daily   Yes Historical Provider, MD   furosemide (LASIX) 20 MG tablet Take 1 tablet by mouth daily 22   Sherrie Waldrop MD   digoxin 62.5 MCG TABS Take 62.5 mcg by mouth daily 10/20/21   Consuelo Russell MD   apixaban (ELIQUIS) 2.5 MG TABS tablet Take by mouth 2 times daily    Historical Provider, MD   acetaminophen (APAP EXTRA STRENGTH) 500 MG tablet Take 2 tablets by mouth 2 times daily 20   Rustam Garcia DO   metoprolol tartrate (LOPRESSOR) 25 MG tablet Take 1 tablet by mouth 2 times daily 19   Emilee Christine MD   Artificial Tear Solution (SOOTHE XP) SOLN Place 1 drop into the left eye 3 times daily    Historical Provider, MD   Cholecalciferol (VITAMIN D3) 1000 UNITS TABS Take 1 tablet by mouth every morning     Historical Provider, MD   carbidopa-levodopa (SINEMET)  MG per tablet Take 1 tablet by mouth 2 times daily     Historical Provider, MD       Current medications:    Current Facility-Administered Medications   Medication Dose Route Frequency Provider Last Rate Last Admin    docusate sodium (COLACE) capsule 100 mg  100 mg Oral Nightly Geovanni Colmenares DO        pantoprazole (PROTONIX) tablet 40 mg  40 mg Oral QAM AC Geovanni Colmenares,         trimethobenzamide Richa Castellani) injection 200 mg  200 mg IntraMUSCular Q6H PRN Geovanni Fracisco Craig, DO        vancomycin (VANCOCIN) 750 mg in dextrose 5 % 250 mL IVPB  750 mg IntraVENous On Call to Victor Hugo Marcus, PA        albuterol (PROVENTIL) nebulizer solution 2.5 mg  2.5 mg Nebulization 4x daily Valjean End, DO   2.5 mg at 06/08/22 0736    polyvinyl alcohol (LIQUIFILM TEARS) 1.4 % ophthalmic solution 1 drop  1 drop Left Eye TID NICOLE Ballesteros   1 drop at 06/07/22 2055    carbidopa-levodopa (SINEMET)  MG per tablet 1 tablet  1 tablet Oral BID NICOLE Ballesteros   1 tablet at 06/07/22 2055    digoxin (LANOXIN) tablet 62.5 mcg  62.5 mcg Oral Daily NICOLE Ballesteros   62.5 mcg at 06/07/22 1032    furosemide (LASIX) tablet 20 mg  20 mg Oral Daily NICOLE Ballesteros   20 mg at 06/07/22 1031    metoprolol tartrate (LOPRESSOR) tablet 25 mg  25 mg Oral BID NICOLE Ballesteros   25 mg at 06/07/22 2055    traMADol (ULTRAM) tablet 50 mg  50 mg Oral Q6H PRN NICOLE Ballesteros        sodium chloride flush 0.9 % injection 10 mL  10 mL IntraVENous 2 times per day NICOLE Ballesteros   10 mL at 06/07/22 2055    sodium chloride flush 0.9 % injection 10 mL  10 mL IntraVENous PRN NICOLE Ballesteros        0.9 % sodium chloride infusion   IntraVENous PRN NICOLE Ballesteros        potassium chloride (KLOR-CON M) extended release tablet 40 mEq  40 mEq Oral PRN NICOLE Ballesteros        Or    potassium bicarb-citric acid (EFFER-K) effervescent tablet 40 mEq  40 mEq Oral PRN NICOLE Ballesteros        Or    potassium chloride 10 mEq/100 mL IVPB (Peripheral Line)  10 mEq IntraVENous PRN NICOLE Ballesteros        ondansetron (ZOFRAN-ODT) disintegrating tablet 4 mg  4 mg Oral Q8H PRN NICOLE Ballesteros        Or    ondansetron [facility]) injection 4 mg  4 mg IntraVENous Q6H PRN NICOLE Ballesteros        magnesium hydroxide (MILK OF MAGNESIA) 400 MG/5ML suspension 30 mL  30 mL Oral Daily PRN NICOLE Ballesteros        acetaminophen (TYLENOL) tablet 650 mg  650 mg Oral Q6H PRN NICOLE Biggs        Or    acetaminophen (TYLENOL) suppository 650 mg  650 mg Rectal Q6H PRN NICOLE Biggs         Facility-Administered Medications Ordered in Other Encounters   Medication Dose Route Frequency Provider Last Rate Last Admin    fentaNYL (SUBLIMAZE) injection   IntraVENous PRN Shaaron Peace, APRN - CRNA   50 mcg at 06/08/22 1311    propofol injection   IntraVENous PRN Shaaron Peace, APRN - CRNA   50 mg at 06/08/22 1311    rocuronium (ZEMURON) injection   IntraVENous PRN Shaaron Peace, APRN - CRNA   25 mg at 06/08/22 1311    0.9 % sodium chloride infusion   IntraVENous Continuous PRN Shaaron Peace, APRN - CRNA   New Bag at 06/08/22 1311       Allergies:     Allergies   Allergen Reactions    Pcn [Penicillins] Itching       Problem List:    Patient Active Problem List   Diagnosis Code    Subtrochanteric fracture of right femur ([facility] [address] S72.21XA    Parkinson's disease ([facility] [address] G20    Renal insufficiency N28.9    Hypertension I10    Abdominal pain R10.9    Chronic bilateral thoracic back pain M54.6, G89.29    Thoracic compression fracture, closed, initial encounter ([facility] [address] S22.000A    SBO (small bowel obstruction) ([facility] [address] K56.609    Red blood cell antibody positive R76.8    Moderate protein-calorie malnutrition ([facility] [address] E44.0    Compression fracture of L2 vertebra ([facility] S32.020A    Lumbar compression fracture, closed, initial encounter ([facility] [address] S32.000A    Paroxysmal atrial fibrillation (HCC) I48.0    Low back pain M54.50    Primary osteoarthritis of left knee M17.12    Back pain M54.9    H/O cholelithiasis Z87.19    Atrial fibrillation with RVR (HCC) I48.91    Chronic heart failure with preserved ejection fraction (HCC) I50.32    Compression fracture of third lumbar vertebra ([facility] S32.030A       Past Medical History:        Diagnosis Date    Anemia     Cholelithiasis     Decreased ambulation status     Gastric reflux     Hypertension     PA[facility] ([facility]oxysmal [facility] fibrillation) ([facility]     Parkinson disease ([facility]     Vitamin D deficiency        Past Surgical History:        Procedure Laterality Date    CATARACT EXTRACTION W/  INTRAOCULAR LENS IMPLANT Left 12/06/2016    CHOLECYSTECTOMY, LAPAROSCOPIC N/A 10/15/2021    LAPAROSCOPIC ROBOTIC ASSISTED CHOLECYSTECTOMY performed by Graham Ortiz MD at [facility]. Left 8/5/2019    LEFT INGUINAL HERNIA REPAIR performed by Silvana Scherer MD at [address] 9 Right 11/16/2015    ORIF left hip inter & subtrochanteric fx. w/long gamma nail. Herman Gutierrez MD    HYSTERECTOMY  2011? robotic    KYPHOSIS SURGERY  01/11/2017    T8    KYPHOSIS SURGERY      KYPHOSIS SURGERY N/A 6/10/2019    T11 VERTEBRAL BODY BIOPSY & KYPHOPLASTY performed by Luz Maria Silva MD at [address] 8/8/2019    KYPHOPLASTY T11, L2 performed by Luz Maria Silva MD at [facility] 8/5/2019    POSSIBLE LAPAROTOMY  POSSIBLE BOWEL RESECTION performed by Silvana Scherer MD at [address] Right 01/16/1997    Right TKA. Julian Lugo. Ricky Lombardi MD       Social History:    Social History     Tobacco Use    Smoking status: Never Smoker    Smokeless tobacco: Never Used   Substance Use Topics    Alcohol use:  No                                Counseling given: Not Answered      Vital Signs (Current):   Vitals:    06/08/22 0130 06/08/22 0600 06/08/22 0736 06/08/22 0800   BP: 115/62 134/62  133/66   Pulse: 65 63  60   Resp: 18 18  16   Temp: 36.4 °C (97.6 °F) 36.6 °C (97.9 °F)  36.4 °C (97.6 °F)   TempSrc: Temporal Temporal  Temporal   SpO2:   92% 94%   Weight:                                                  BP Readings from Last 3 Encounters:   06/08/22 133/66   05/11/22 102/62   11/10/21 128/60       NPO Status:                                                    npo today                             BMI:   Wt Readings from Last 3 Encounters:   06/06/22 88 lb (39.9 kg) 05/11/22 88 lb (39.9 kg)   11/10/21 86 lb 6.4 oz (39.2 kg)     Body mass index is 16.63 kg/m². CBC:   Lab Results   Component Value Date    WBC 7.0 06/07/2022    RBC 2.69 06/07/2022    HGB 9.4 06/07/2022    HCT 30.0 06/07/2022    .5 06/07/2022    RDW 14.8 06/07/2022     06/07/2022       CMP:   Lab Results   Component Value Date     06/08/2022    K 3.7 06/08/2022    K 3.8 06/07/2022     06/08/2022    CO2 26 06/08/2022    BUN 20 06/08/2022    CREATININE 0.8 06/08/2022    GFRAA >60 06/08/2022    LABGLOM >60 06/08/2022    GLUCOSE 73 06/08/2022    PROT 6.7 10/17/2021    CALCIUM 9.1 06/08/2022    BILITOT 0.5 10/17/2021    ALKPHOS 112 10/17/2021    AST 34 10/17/2021    ALT <5 10/17/2021       POC Tests: No results for input(s): POCGLU, POCNA, POCK, POCCL, POCBUN, POCHEMO, POCHCT in the last 72 hours. Coags:   Lab Results   Component Value Date    PROTIME 13.2 06/07/2022    INR 1.2 06/07/2022    APTT 66.1 07/16/2021       HCG (If Applicable): No results found for: PREGTESTUR, PREGSERUM, HCG, HCGQUANT     ABGs: No results found for: PHART, PO2ART, GYH9TOV, PXE7GQJ, BEART, X3BILNPK     Type & Screen (If Applicable):  No results found for: LABABO, LABRH    Drug/Infectious Status (If Applicable):  No results found for: HIV, HEPCAB    COVID-19 Screening (If Applicable):   Lab Results   Component Value Date    COVID19 Detected 01/18/2022    COVID19 Not Detected 10/25/2021           Anesthesia Evaluation  Patient summary reviewed and Nursing notes reviewed no history of anesthetic complications:   Airway: Mallampati: I  TM distance: >3 FB   Neck ROM: full  Mouth opening: < 3 FB   Dental:    (+) lower dentures and upper dentures      Pulmonary:   (+) decreased breath sounds           Patient did not smoke on day of surgery.                  Cardiovascular:  Exercise tolerance: good (>4 METS),   (+) hypertension: moderate, dysrhythmias: atrial fibrillation,         Rhythm: regular Neuro/Psych:   Negative Neuro/Psych ROS              GI/Hepatic/Renal: Neg GI/Hepatic/Renal ROS            Endo/Other:              Pt had no PAT visit        ROS comment: Hx of osteoporosis Abdominal:             Vascular: Other Findings:           Anesthesia Plan      general     ASA 3       Induction: intravenous. MIPS: Postoperative opioids intended and Prophylactic antiemetics administered. Anesthetic plan and risks discussed with patient.                         JERMAN Gross - CRNA   6/8/2022

## 2022-06-08 NOTE — PROGRESS NOTES
Department of Neurosurgery  Attending Progress Note    CHIEF COMPLAINT: seen for back pain    SUBJECTIVE:  C/o back pain    ROS:  HEENT: negative for headache  CVS: negative for chest pain  Resp: negative for SOB      OBJECTIVE  Physical  VITALS:  BP (!) 129/49   Pulse 95   Temp 97.5 °F (36.4 °C) (Temporal)   Resp 16   Wt 88 lb (39.9 kg)   SpO2 98%   BMI 16.63 kg/m²   NEUROLOGIC:  Mental Status Exam:  Level of Alertness:   awake  Orientation:   person, place  Motor Exam:  Motor exam is symmetrical 5 out of 5 all extremities bilaterally  Sensory:  Sensory intact    Data  CBC:   Lab Results   Component Value Date    WBC 7.0 06/07/2022    RBC 2.69 06/07/2022    HGB 9.4 06/07/2022    HCT 30.0 06/07/2022    .5 06/07/2022    MCH 34.9 06/07/2022    MCHC 31.3 06/07/2022    RDW 14.8 06/07/2022     06/07/2022    MPV 10.6 06/07/2022     BMP:    Lab Results   Component Value Date     06/07/2022    K 3.8 06/07/2022     06/07/2022    CO2 24 06/07/2022    BUN 23 06/07/2022    LABALBU 3.4 10/17/2021    CREATININE 0.9 06/07/2022    CALCIUM 9.2 06/07/2022    GFRAA >60 06/07/2022    LABGLOM 58 06/07/2022    GLUCOSE 66 06/07/2022     Current Inpatient Medications  Current Facility-Administered Medications: docusate sodium (COLACE) capsule 100 mg, 100 mg, Oral, Nightly  [START ON 6/8/2022] pantoprazole (PROTONIX) tablet 40 mg, 40 mg, Oral, QAM AC  trimethobenzamide (TIGAN) injection 200 mg, 200 mg, IntraMUSCular, Q6H PRN  [START ON 6/8/2022] vancomycin (VANCOCIN) 750 mg in dextrose 5 % 250 mL IVPB, 750 mg, IntraVENous, On Call to OR  albuterol (PROVENTIL) nebulizer solution 2.5 mg, 2.5 mg, Nebulization, 4x daily  polyvinyl alcohol (LIQUIFILM TEARS) 1.4 % ophthalmic solution 1 drop, 1 drop, Left Eye, TID  carbidopa-levodopa (SINEMET)  MG per tablet 1 tablet, 1 tablet, Oral, BID  digoxin (LANOXIN) tablet 62.5 mcg, 62.5 mcg, Oral, Daily  furosemide (LASIX) tablet 20 mg, 20 mg, Oral, Daily  metoprolol tartrate (LOPRESSOR) tablet 25 mg, 25 mg, Oral, BID  traMADol (ULTRAM) tablet 50 mg, 50 mg, Oral, Q6H PRN  sodium chloride flush 0.9 % injection 10 mL, 10 mL, IntraVENous, 2 times per day  sodium chloride flush 0.9 % injection 10 mL, 10 mL, IntraVENous, PRN  0.9 % sodium chloride infusion, , IntraVENous, PRN  potassium chloride (KLOR-CON M) extended release tablet 40 mEq, 40 mEq, Oral, PRN **OR** potassium bicarb-citric acid (EFFER-K) effervescent tablet 40 mEq, 40 mEq, Oral, PRN **OR** potassium chloride 10 mEq/100 mL IVPB (Peripheral Line), 10 mEq, IntraVENous, PRN  ondansetron (ZOFRAN-ODT) disintegrating tablet 4 mg, 4 mg, Oral, Q8H PRN **OR** ondansetron (ZOFRAN) injection 4 mg, 4 mg, IntraVENous, Q6H PRN  magnesium hydroxide (MILK OF MAGNESIA) 400 MG/5ML suspension 30 mL, 30 mL, Oral, Daily PRN  acetaminophen (TYLENOL) tablet 650 mg, 650 mg, Oral, Q6H PRN **OR** acetaminophen (TYLENOL) suppository 650 mg, 650 mg, Rectal, Q6H PRN    ASSESSMENT AND PLAN:    80year old lady who presents with L1, L3 and L4 compression fractures. She states that she is having too much p[ain and cannot tolerate  A brace.   Will consider kyphoplasty in am    Melisa Chandra MD

## 2022-06-08 NOTE — PROGRESS NOTES
Patient experiencing frequent bowel movements since start of shift. Loose, mucousy, and orange in color. Patient is now also experiencing incontinence also when she was not prior. Attending notified. New order placed.

## 2022-06-08 NOTE — ANESTHESIA POSTPROCEDURE EVALUATION
Department of Anesthesiology  Postprocedure Note    Patient: Jake Wilkinson  MRN: [id number]  [place]: 7/8/1928  Date of evaluation: 6/8/2022  Time:  3:12 PM     Procedure Summary     Date: 06/08/22 Room / Location: [facility] OR 06 / [facility]    Anesthesia Start: 3971 Anesthesia Stop: 6334    Procedure: L1, L3, L4 KYPHOPLASTY-NEEDS 2C-ARM (N/A Back) Diagnosis:       Compression fracture of L1 vertebra, initial encounter ([facility] [address]      (/)    Surgeons: Hernandez Hightower MD Responsible Provider: Verna Mccabe MD    Anesthesia Type: general ASA Status: 3          Anesthesia Type: No value filed. Bill Phase I: Bill Score: 8    Bill Phase II:      Last vitals: Reviewed and per EMR flowsheets.        Anesthesia Post Evaluation    Patient location during evaluation: PACU  Patient participation: complete - patient participated  Level of consciousness: awake and alert  Airway patency: patent  Nausea & Vomiting: no nausea and no vomiting  Complications: no  Cardiovascular status: hemodynamically stable  Respiratory status: acceptable  Hydration status: euvolemic

## 2022-06-09 NOTE — CONSULTS
Inpatient Cardiology Consultation      Reason for Consult:  AF    Consulting Physician: Dr. Kailey Dwyer    Requesting Physician:  Dr. Turner Prieto    Date of Consultation: 6/9/2022    HISTORY OF PRESENT ILLNESS:   Ms. Aidee Patel is a 80year old  female who is known to Dr. Billy Mendoza; most recently seen in outpatient on 5/11/2022 --> EKG showing A. fib with CVR, patient was continued on Lopressor 25 twice daily, Eliquis and Lasix 20 mg twice daily. Echocardiogram was ordered to assess for biventricular function (not yet completed). PMH: Permanent Afib, chronic anticoagulation (Eliquis), secondary hypercoagulable state, hypertension, hyperlipidemia, chronic HFpEF, status postcholecystectomy (10/15/2021), CKD, frailty, Parkinson's disease, history of back pain status post kyphoplasty 6/2019 and 8/2021, chronic anemia. [facility] 6[facility]6/[facility]22 with complaints of low back pain, worse with movement that started 3 days prior to admission. Patient stated that she woke up from a nap and suddenly had low back pain is progressively worsened. Patient denies any bowel or bladder incontinence. Denies chest pain, shortness of breath, lower extremity edema, fever, cough, chills, nausea or vomiting. Patient lives with her son and pivots. She does have a walker and wheelchair at home for assistance. Upon arrival to the ED: /67, heart rate 88, afebrile, 93% on room air. Labs: Sodium 139, potassium 4.5, BUN 28, creatinine 1.0>>0.9 (today), WBC 9.9, H/H 9.9/31.0, platelet count 137. UA: Trace leukocytes. No initial EKG on admission. Patient was noted to have acute osteoporotic compression fracture at L1, L3 and L4 and underwent a balloon kyphoplasty of L1, L3 and L4 with neurosurgery on 6/8/2022. Cardiology was consulted for management of AF. Eliquis held on admission for surgery.        Please note: past medical records were reviewed per electronic medical record (EMR) - see detailed reports under Past Medical/ Surgical History. Past Medical History:    1. Lifelong non smoker  2. HTN  3. Anemia  4. 1997 R TKA  5. Fragility  6. Hystrectomy  7. 11/2015 Hip hip fracture s.p ORIF  8. Parkinson's  9. Cataract surgery  10. 6/10/2019 T12 kyphoplasty  11. Admission 8/1/2019-8/7/2019 due to incarcerated small bowel hernia with high grade obstruction. 12. 8/2/2019 diagnosed with AF [id number]. Patient is anticoagulated with Eliquis 2.5 mg twice daily. 13. 8/2019 2.8 second pause  14. 8/2/2019 TTE: EF 55-60%. Mild CLVH. Moderately dilated LA, AF. Mild MR/AI  15. 8/5/2019 left inguinal herniorrhaphy with mesh placement   16. 8/7/2019 AF with 2.8 second pause  17. 8/8/2019  T11, L2 kyphoplasty and bone biopsy  18. DNR-CCA  19. Status post balloon kyphoplasty L1, L3 and L4 with neurosurgery on 6/8/2022. Medications Prior to admit:  Prior to Admission medications    Medication Sig Start Date End Date Taking? Authorizing Provider   traMADol (ULTRAM) 50 MG tablet Take 1 tablet by mouth every 6 hours as needed for Pain for up to 7 days.  6/8/22 6/15/22 Yes NICOLE Macias   doxycycline hyclate (VIBRAMYCIN) 100 mg capsule Take 100 mg by mouth 2 times daily   Yes Historical Provider, MD   furosemide (LASIX) 20 MG tablet Take 1 tablet by mouth daily 5/11/22   Xuan Baker MD   digoxin 62.5 MCG TABS Take 62.5 mcg by mouth daily 10/20/21   Galilea Schuster MD   apixaban (ELIQUIS) 2.5 MG TABS tablet Take by mouth 2 times daily    Historical Provider, MD   acetaminophen (APAP EXTRA STRENGTH) 500 MG tablet Take 2 tablets by mouth 2 times daily 8/31/20   Garr Riedel, DO   metoprolol tartrate (LOPRESSOR) 25 MG tablet Take 1 tablet by mouth 2 times daily 8/9/19   Yolis Simmons MD   Artificial Tear Solution (SOOTHE XP) SOLN Place 1 drop into the left eye 3 times daily    Historical Provider, MD   Cholecalciferol (VITAMIN D3) 1000 UNITS TABS Take 1 tablet by mouth every morning     Historical Provider, MD   carbidopa-levodopa (SINEMET)  MG per tablet Take 1 tablet by mouth 2 times daily     Historical Provider, MD       Current Medications:    Current Facility-Administered Medications: sodium chloride flush 0.9 % injection 5-40 mL, 5-40 mL, IntraVENous, 2 times per day  sodium chloride flush 0.9 % injection 5-40 mL, 5-40 mL, IntraVENous, PRN  0.9 % sodium chloride infusion, , IntraVENous, PRN  ondansetron (ZOFRAN-ODT) disintegrating tablet 4 mg, 4 mg, Oral, Q8H PRN **OR** ondansetron (ZOFRAN) injection 4 mg, 4 mg, IntraVENous, Q6H PRN  docusate sodium (COLACE) capsule 100 mg, 100 mg, Oral, Nightly  pantoprazole (PROTONIX) tablet 40 mg, 40 mg, Oral, QAM AC  trimethobenzamide (TIGAN) injection 200 mg, 200 mg, IntraMUSCular, Q6H PRN  albuterol (PROVENTIL) nebulizer solution 2.5 mg, 2.5 mg, Nebulization, 4x daily  polyvinyl alcohol (LIQUIFILM TEARS) 1.4 % ophthalmic solution 1 drop, 1 drop, Left Eye, TID  carbidopa-levodopa (SINEMET)  MG per tablet 1 tablet, 1 tablet, Oral, BID  digoxin (LANOXIN) tablet 62.5 mcg, 62.5 mcg, Oral, Daily  furosemide (LASIX) tablet 20 mg, 20 mg, Oral, Daily  metoprolol tartrate (LOPRESSOR) tablet 25 mg, 25 mg, Oral, BID  traMADol (ULTRAM) tablet 50 mg, 50 mg, Oral, Q6H PRN  sodium chloride flush 0.9 % injection 10 mL, 10 mL, IntraVENous, 2 times per day  sodium chloride flush 0.9 % injection 10 mL, 10 mL, IntraVENous, PRN  0.9 % sodium chloride infusion, , IntraVENous, PRN  potassium chloride (KLOR-CON M) extended release tablet 40 mEq, 40 mEq, Oral, PRN **OR** potassium bicarb-citric acid (EFFER-K) effervescent tablet 40 mEq, 40 mEq, Oral, PRN **OR** potassium chloride 10 mEq/100 mL IVPB (Peripheral Line), 10 mEq, IntraVENous, PRN  ondansetron (ZOFRAN-ODT) disintegrating tablet 4 mg, 4 mg, Oral, Q8H PRN **OR** ondansetron (ZOFRAN) injection 4 mg, 4 mg, IntraVENous, Q6H PRN  magnesium hydroxide (MILK OF MAGNESIA) 400 MG/5ML suspension 30 mL, 30 mL, Oral, Daily PRN  acetaminophen (TYLENOL) tablet 650 mg, 650 mg, Oral, Q6H PRN **OR** acetaminophen (TYLENOL) suppository 650 mg, 650 mg, Rectal, Q6H PRN    Allergies:  Pcn [penicillins] (rash)    Social History:    Lifelong non-smoker, denies alcohol and illicit drug use. Resides with her son at home. CODE STATUS DNR CCA. Family History: Noncontributory due to advanced age. REVIEW OF SYSTEMS:     · Constitutional: + fatigue. Denies fevers, chills or night sweats  · Eyes: Denies visual changes or drainage  · ENT: Denies headaches or hearing loss. No mouth sores or sore throat. No epistaxis   · Cardiovascular: See HPI. No lower extremity swelling. · Respiratory: Denies ARRIOLA, cough, orthopnea or PND. No hemoptysis   · Gastrointestinal: Denies hematemesis or anorexia. No hematochezia or melena    · Genitourinary: Denies urgency, dysuria or hematuria. · Musculoskeletal: See HPI. · Integumentary: Denies rash, hives or pruritis   · Neurological: Denies dizziness, headaches or seizures. No numbness or tingling  · Psychiatric: Denies anxiety or depression. · Endocrine: Denies temperature intolerance. No recent weight change. .  · Hematologic/Lymphatic: Denies abnormal bruising or bleeding. No swollen lymph nodes    PHYSICAL EXAM:   BP (!) 128/57   Pulse 74   Temp 97 °F (36.1 °C) (Temporal)   Resp 16   Ht 5' 1\" (1.549 m)   Wt 61 lb 11.7 oz (28 kg)   SpO2 90%   BMI 11.66 kg/m²   CONST: Early, frail  female who appears of stated age. Awake, alert and cooperative. No apparent distress. HEENT:   Head- Normocephalic, atraumatic   Eyes- Conjunctivae pink, anicteric  Throat- Oral mucosa pink and moist  Neck-  No stridor, trachea midline, no jugular venous distention. No carotid bruit. CHEST: Chest symmetrical and non-tender to palpation. No accessory muscle use or intercostal retractions  RESPIRATORY: Lung sounds -diminished in bases.   CARDIOVASCULAR:     Heart Inspection- shows no noted pulsations  Heart Palpation- no heaves or thrills; PMI is non-displaced   Heart Ausculation-IRR, no murmur. No s3 or rub   PV: No lower extremity edema. No varicosities. Pedal pulses palpable, no clubbing or cyanosis   ABDOMEN: Soft, non-tender to light palpation. Bowel sounds present. No palpable masses no organomegaly; no abdominal bruit  MS: Good muscle strength and tone. No atrophy or abnormal movements. : Pure wick catheter draining clear yellow urine. SKIN: Warm and dry no statis dermatitis or ulcers   NEURO / PSYCH: Oriented to person, place and time. Speech clear and appropriate. Follows all commands. Hard of hearing. DATA:    ECG: See HPI. Tele strips: Afib with CVR. Diagnostic:      Intake/Output Summary (Last 24 hours) at 6/9/2022 0816  Last data filed at 6/9/2022 0340  Gross per 24 hour   Intake 870 ml   Output 5 ml   Net 865 ml       Labs:   CBC:   Recent Labs     06/06/22  1228 06/07/22  0538   WBC 9.9 7.0   HGB 9.9* 9.4*   HCT 31.0* 30.0*    198     BMP:   Recent Labs     06/08/22  0618 06/09/22  0550    139   K 3.7 4.3   CO2 26 24   BUN 20 20   CREATININE 0.8 0.9   LABGLOM >60 58   CALCIUM 9.1 8.8     TFT:   Lab Results   Component Value Date    TSH 0.852 10/15/2021    T4FREE 1.70 10/15/2021      PT/INR:   Recent Labs     06/07/22  1628   PROTIME 13.2*   INR 1.2     CT thoracic spine without contrast: 6/6/2022:  No acute fracture, chronic compressive fracture deformities in the lower thoracic spine, resulting in exaggerated thoracic kyphosis, multilevel kyphoplasty changes. CT of the lumbar spine: 6/6/2022:  Age indeterminate, possibly acute or subacute compression fracture deformity of L3 vertebral body with approximately 50% loss of height, chronic compression fracture deformities at L1, L2 and L4. MRI thoracic spine without contrast: 6/7/2022:  Acute to subacute compression fraction deformities of L1, L3 and L4. No significant retropulsion.   Multiple remote compression fracture deformities since cement augmentation changes throughout the thoracic spine. MRI lumbar spine without contrast: 2022:  Acute to subacute compression fraction deformities of L1, L3 and L4, no significant retropulsion, multiple remote compression fracture deformities. Chest x-ray: 2022:  No acute process. Ultrasound Doppler lower extremities: 2022: No evidence of DVT. Prior cardiac testin2019 TTE: EF 55-60%. Mild CLVH. Moderately dilated LA, AF. Mild MR/AI    No prior LHC or stress test.    Assessment:  1. Admitted (2022) with low back pain with acute to subacute compression fracture deformities of L1, L3 and L4 status post kyphoplasty 2022. History of kyphoplasty (2019 and 2021). 2. Permanent AF  3. Chronic OAC (Eliquis)  4. Secondary hypercoagulable state  5. Mild MR/AI on TTE 2019 with LVEF 55-60%. 6. Hypertension  7. CKD: Stable. Creatinine 0.9 (today). 8. Frail /mostly sedentary  9. Parkinson's disease  10. Chronic anemia  11. History of left inguinal herniorrhaphy and GERD soft mesh (2021). Plan:  · Obtain EKG  · Check CBC  · Discontinue Digoxin   · Continue current cardiac medications  · Recommend resuming Eliquis when and if okay with neurosurgery  · Check 2D echocardiogram to further assess LV function  · Aggressive risk factor modification  · Further recommendations to follow. The above assessment and plan discussed with Dr. Christian Mcintyre. Electronically signed by JERMAN Vallejo CNP on 22 at 8:34 AM EDT      Patient seen and examined and case discussed in detail with cardiology nurse practitioner and agree with assessment and plan and history physical examination discussed in detail and documented above.  I also independently examined the patient, reviewed the patient chart, medication, blood work and imaging and contributed more than 50% of the patient care.

## 2022-06-09 NOTE — OP NOTE
[address]                  [address] [place],  [facility]                                OPERATIVE REPORT    PATIENT NAME: Ayaz Jimenes                    :        1928  MED REC NO:   [id number]                            ROOM:       8506  ACCOUNT NO:   [de-identified]                           ADMIT DATE: 2022  PROVIDER:     Garland Mcrae MD    DATE OF PROCEDURE:  2022    PREOPERATIVE DIAGNOSIS:  Acute osteoporotic compression fractures at L1,  L3 and L4. POSTOPERATIVE DIAGNOSIS:  Acute osteoporotic compression fractures at  L1, L3 and L4. OPERATIVE PROCEDURE:  1.  Left-sided unilateral percutaneous transpedicular approach to L1  vertebral body for L1 vertebral body bone biopsy and L1 vertebral body  balloon kyphoplasty with use of Medtronic Kyphon balloon and  polymethylmethacrylate. 2.  Left-sided unilateral percutaneous transpedicular approach to L3  vertebral body for L3 vertebral body bone biopsy and L3 vertebral body  balloon kyphoplasty with use of Medtronic Kyphon balloon and  polymethylmethacrylate. 3.  Left-sided unilateral percutaneous transpedicular approach to L4  vertebral body for L4 vertebral body bone biopsy and L4 vertebral body  balloon kyphoplasty with use of Medtronic Kyphon balloon and  polymethylmethacrylate. 4.  Use of intraoperative fluoroscopy, biplanar intraoperative  fluoroscopy interpreted by myself, the surgeon. ANESTHESIA:  Generalized endotracheal anesthesia. SURGEON:  Garland Mcrae MD    ASSISTANT:  None. COMPLICATIONS:  None. ESTIMATED BLOOD LOSS:  Minimal.    SPECIMEN:  Bone. OPERATIVE INDICATIONS:  The patient is a 19-year-old lady who presented  to the hospital with excruciating back pain. She was unable to  ambulate. She was found to have acute osteoporotic compression  fractures at L1, L3 and L4.   Attempts were made to try and manage this  conservatively, however, she was unable to tolerate the pain medication  and after risks, benefits and alternatives were discussed with the  patient and her family, it was determined she would undergo the  above-listed procedure. DESCRIPTION OF OPERATIVE PROCEDURE:  The patient was brought into the  operating room. A timeout was performed where she was identified by her  name, medical record number and the operative procedure which she was  about to undergo. Next, induction of generalized endotracheal  anesthesia was then commenced. Upon completion of induction of  generalized endotracheal anesthesia, she received preoperative  antibiotics. She was then flipped into prone position on a Tevin  table. All pressure points were padded. Her lumbosacral region was  prepped and draped in usual sterile fashion. Next, using biplanar  fluoroscopy, I marked entry point to the L1 pedicle on the patient's  left side. I infiltrated the skin with lidocaine with epinephrine  1:1000. I used a 15-blade to make a stab incision. I docked Reanna  One-Step Osteo Introducer on the facet, advanced through the pedicle  into the vertebral body. I was able to get across midline. I removed  the inner stylet, left the outer working cannula in place. I inserted a  bone biopsy tool. After obtaining a biopsy, I inserted Medtronic Kyphon  balloon that was inflated to 200 psi, deflated balloon, injected 4.5 mL  of polymethylmethacrylate into the vertebral body at L1. There was  evidence of extravasation of cement. I removed the outer working  cannula and closed the skin with Dermabond. Next, I identified the  entry point into the pedicle of L3 on the left. I infiltrated the skin  with lidocaine with epinephrine 1:1000. I used a 15-blade to make a  stab incision. I docked One-Step Osteo Introducer on the facet,  advanced through the pedicle into the vertebral body. I was able to get  across midline.   I removed the inner stylet, left the outer working  cannula in place. I inserted a bone biopsy tool. After obtaining a  biopsy, I inserted Medtronic Kyphon balloon that was inflated to 320  PSI, deflated the balloon, injected a total of 4.5 mL of  polymethylmethacrylate into vertebral body. There was no evidence of  extravasation of cement. I removed the outer working cannula and closed  the skin with Dermabond. I finally identified the entry point to the L4  pedicle on the patient's left side. I infiltrated skin with lidocaine  with epinephrine 1:1000. I used a 15-blade to make a stab incision. I  docked One-Step Osteo Introducer on the facet, advanced through the  pedicle into the vertebral body. I then removed the inner stylet, left  the outer working cannula in place. I inserted a bone biopsy tool. After obtaining a biopsy, I inserted Medtronic Kyphon balloon that was  inflated to 280 psi. I then deflated balloon. I injected a total of  4.5 mL of polymethylmethacrylate into the vertebral body. After this  was done, I removed the outer working cannula, obtained a final AP and  lateral fluoroscopic image. There was no evidence of extravasation of  cement. The skin was closed with Dermabond. The patient was then  flipped in supine position on her hospital bed, was extubated and  transported to the postanesthesia care unit in stable condition. There  were no complications. Counts were correct. I was present for the  entire case.         Cherylene Crome, MD    D: 06/08/2022 23:36:50       T: 06/08/2022 23:40:06     [email or web address]  Job#: [id number]     Doc#: [id number]    CC:

## 2022-06-09 NOTE — BRIEF OP NOTE
Brief Postoperative Note      Patient: Phani Alvarenga  YOB: 1928  MRN: [id number]    Date of Procedure: 6/8/2022    Pre-Op Diagnosis: L1, L3 and L4 acute osteoporotic compression fracture    Post-Op Diagnosis: Same       Procedure(s):  L1, L3, L4 KYPHOPLASTY-NEEDS 2C-ARM    Surgeon(s):  Butch Salgado MD    Assistant:  * No surgical staff found *    Anesthesia: General    Estimated Blood Loss (mL): Minimal    Complications: None    Specimens:   ID Type Source Tests Collected by Time Destination   A : BONE BIOPSIES L1, L3, L4 Bone Biopsy SURGICAL PATHOLOGY Butch Salgado MD 6/8/2022 1330        Implants:  Implant Name Type Inv.  Item Serial No.  Lot No. LRB No. Used Action   KIT CEMENT BONE COMBO Colon Leventhal HV-R - [id number]  KIT CEMENT BONE COMBO Colon Leventhal HV-R  [employer] [facility] DAN-WD [id number] N/A 1 Implanted         Drains: * No LDAs found *    Findings: see dictated op note    Electronically signed by Luz García MD on 6/8/2022 at 11:36 PM

## 2022-06-09 NOTE — PROGRESS NOTES
safely. Prognosis is fair for reaching above PT goals. Patient and or family understand(s) diagnosis, prognosis, and plan of care. yes    PHYSICAL THERAPY PLAN OF CARE:    PT POC is established based on physician order and patient diagnosis     Referring provider/PT Order:    Start   Ordering Provider    06/08/22 1645  PT eval and treat  Start:  06/08/22 1645,   End:  06/08/22 1645,   ONE TIME,   Standing Count:  1 Occurrences,   R         NICOLE Finley        Diagnosis:  Lumbar compression fracture, closed, initial encounter ([facility] [address] [S32.000A]  Specific instructions for next treatment:  Trial standing, transfers, ambulation     Current Treatment Recommendations:     [x] Strengthening to improve independence with functional mobility   [] ROM to improve independence with functional mobility   [x] Balance Training to improve static/dynamic balance and to reduce fall risk  [x] Endurance Training to improve activity tolerance during functional mobility   [x] Transfer Training to improve safety and independence with all functional transfers   [x] Gait Training to improve gait mechanics, endurance and assess need for appropriate assistive device  [x] Stair Training in preparation for safe discharge home and/or into the community   [x] Positioning to prevent skin breakdown and contractures  [x] Safety and Education Training   [x] Patient/Caregiver Education   [] HEP  [] Other     PT long term treatment goals are located in above grid    Frequency of treatments: 2-5x/week x 1-2 weeks. Time in  1032  Time out  1130    Total Treatment Time  40 minutes     Evaluation Time includes thorough review of current medical information, gathering information on past medical history/social history and prior level of function, completion of standardized testing/informal observation of tasks, assessment of data and education on plan of care and goals.     CPT codes:  [x] Low Complexity PT evaluation 83959  [] Moderate Complexity PT evaluation [id number]  [] High Complexity PT evaluation [id number]  [] PT Re-evaluation [id number]  [] Gait training [phone] -- minutes  [] Manual therapy 22080 -- minutes  [x] Therapeutic activities 95350 40 minutes  [] Therapeutic exercises 85590 -- minutes  [] Neuromuscular reeducation 96016 -- minutes     Cherie Wyatt, PT, DPT  [id number]

## 2022-06-09 NOTE — PROGRESS NOTES
[address] [place] [address] Ave  [address] [place] [address]                                                  Patient Name: Juana Dorsey    MRN: [id number]    : 1928    Room: [address] [place] [address]      Evaluating OT: Mili Katz, [address] OTR/L; [id number]      Referring Provider: Franko Pulse, PA    Specific Provider Orders/Date: OT Eval and Treat 2022      Diagnosis: Lumbar compression fracture, closed, initial encounter    Compression fracture of third lumbar vertebra     Surgery:  L1, L3, L4 KYPHOPLASTY 2022    Pertinent Medical History:  has a past medical history of Anemia, Cholelithiasis, Decreased ambulation status, Gastric reflux, Hypertension, PAF (paroxysmal atrial fibrillation) ([facility] [address] Parkinson disease ([facility] [address] and Vitamin D deficiency.      Reason for admission: woke up from nap and experiencing increased pain in back    Recommended Adaptive Equipment: TBD pending progression     Precautions:  Fall Risk, Incontinent      Assessment of current deficits:    [x] Functional mobility  [x]ADLs  [x] Strength               [x]Cognition    [x] Functional transfers   [x] IADLs         [x] Safety Awareness   [x]Endurance    [x] Fine Coordination              [x] Balance      [] Vision/perception   []Sensation     []Gross Motor Coordination  [] ROM  [] Delirium                   [] Motor Control     OT PLAN OF CARE   OT POC based on physician orders, patient diagnosis and results of clinical assessment    Frequency/Duration: 1-3 days/wk for 2 weeks PRN   Specific OT Treatment Interventions to include:   * Instruction/training on adapted ADL techniques and AE recommendations to increase functional independence within precautions       * Training on energy conservation strategies, correct breathing pattern and techniques to improve independence/tolerance for self-care routine  * Functional transfer/mobility training/DME recommendations for increased independence, safety, and fall prevention  * Patient/Family education to increase follow through with safety techniques and functional independence  * Recommendation of environmental modifications for increased safety with functional transfers/mobility and ADLs  * Therapeutic exercise to improve motor endurance, ROM, and functional strength for ADLs/functional transfers  * Therapeutic activities to facilitate/challenge dynamic balance, stand tolerance for increased safety and independence with ADLs    Home Living: Pt lives with son in 1 story home with 4 steps and BHR to enter.    Bathroom setup: tub/shower unit with shower chair and grab bars, raised toilet seat with grab bars   Equipment owned: ww, rolaltor, reacher    Prior Level of Function: ADLS - assist from daughter with bathing, daughter reports one family member always with pt during dressing tasks (requires assist with all LB dressing)  IADLs - dependent on son for laundry, family always present to provide cooking for all meals,   ambulated with rollator    Pain Level: 6/10 back pain   Cognition: A&O: 4/4 - pt also recalled all PLOF correctly per daughter present  Follows multi step directions   Memory:  good   Sequencing:  fair   Problem solving:  fair   Judgement/safety:  fair     Functional Assessment:  AM-PAC Daily Activity Raw Score: 13/24   Initial Eval Status  Date: 6/9/22 Treatment Status  Date: STGs = LTGs  Time frame: 10-14 days   Feeding Stand by Assist   Tray set up/opening packages  Independent    Grooming Minimal Assist   Combing hair seated EOB  Stand by Assist    UB Dressing Minimal Assist  Doff/raisa gown lying supine in bed   Stand by Assist    LB Dressing Dependent   Increased pain with all functionale mobility   Maximal Assist    Bathing Maximal Assist  Poor functional reach for LB bathing   Encouraged continued use of shower chair d/t poor dynamic standing balance  Minimal Assist    Toileting Dependent   incontinent of urine during session   Pt able to verbalize when urinating however unable to control  Moderate Assist    Bed Mobility  Log Roll: Mod A  Supine to sit: Moderate Assist   Sit to supine: Moderate Assist   Supine to sit: Stand by Assist   Sit to supine: Stand by Assist    Functional Transfers Sit to stand: Mod A with HHA   Stand to sit:Mod A with HHA   Stand pivot: NT  Commode: NT  Pt urinated upon standing and  requesting return to supine d/t overall back pain  Sit to stand: SBA with ww   Stand to sit:SBA with ww   Stand pivot: SBA with ww   Commode: SBA with ww     Functional Mobility Mod A with HHA   Side step to left - shuffling  Pt presented with forward flexion  SBA with ww     Balance Sitting:     Static - SBA     Dynamic - SBA  Standing: Mod A with HHA  Sitting:  Static: IND  Dynamic: IND  Standing: SBA with ww    Activity Tolerance POOR  Limited d/t overall pain and fatigue   Pt requesting to return to bed following short participation in  functional tasks/mobility   pt required increased time to complete all tasks d/t fatigued/SOB  good   Visual/  Perceptual Glasses: yes                  Vitals SpO2 at rest on 1.5L NC: 91%  SpO2 EOB on 1.5L NC: 80% increased O2 to 2 L to recover to 90%    Difficulty obtaining reading upon ending session and pt lying supine - RN notified - pt remained on 2L and not noted SOB         Hand Dominance: Right   AROM (PROM) Strength Additional Info:  Goal:   RUE  WFL grossly tested FAIR  and wfl FMC/dexterity noted during ADL tasks   Improve overall RUE strength for participation in functional tasks       LUE WFL  grossly tested FAIR  and wfl FMC/dexterity noted during ADL tasks   Improve overall LUE strength for participation in functional tasks         Hearing: [place]  Sensation:  No c/o numbness or tingling   Tone: WFL   Edema: Unremarkable    Comment: Cleared by RN to see pt.  Upon arrival patient lying supine in bed with daughter present and agreeable to OT session. At end of session, patient lying supine in bed with daughter present with call light and phone within reach, all lines and tubes intact. Overall patient demonstrated  decreased independence and safety during completion of ADL/functional transfer/mobility tasks. Pt would benefit from continued skilled OT to increase safety and independence with completion of ADL/IADL tasks for functional independence and quality of life. Treatment: OT treatment provided this date includes:    ADL-  Instruction/training on safety and adapted techniques for completion of ADLs: raisa socks and gown    Mobility-  Instruction/training on safety and improved independence with bed mobility/functional transfers/ and functional mobility - educated on log roll to prevent increased stress/pain, educated on proper safety with functional mobility    Sitting EOB x 10 minutes to improve dynamic sitting balance and activity tolerance during ADLs.   Activity tolerance- Instruction/training on energy conservation/work simplification for completion of ADLs - educated on rest breaks and deep breathing     Rehab Potential: Good  for established goals     LTG: maximize independence with ADLs to return to PLOF    Patient and/or family were instructed on functional diagnosis, prognosis/goals and OT plan of care. Demonstrated fair understanding. [] Malnutrition indicators have been identified and nursing has been notified to ensure a dietitian consult is ordered. Eval Complexity: MOD  · History: Expanded chart review of medical records and additional review of physical, cognitive, or psychosocial history related to current functional performance  · Exam: 3+ performance deficits  · Assistance/Modification: MOD assistance or modifications required to perform tasks. May have comorbidities that affect occupational performance.     Evaluation time includes thorough review of current medical

## 2022-06-09 NOTE — PROGRESS NOTES
Hospitalist Progress Note      PCP: Srikanth Blackwell MD    Date of Admission: 6/6/2022        Hospital Course:  **89 y.o. female presented with BACK PAIN, SON SAYS MOM DID NOT FALL, HOWEVER SHE MOVED OR TURNED OVER IN BED AND FELT SEVERE BACK [place], FOUND TO HAVE COMPRESSION FRACTURES L3 compression fx** kyphoplasty on 6.8, went into a fib* dig discontinued per card.           Subjective: * no complaints          Medications:  Reviewed    Infusion Medications    sodium chloride      sodium chloride       Scheduled Medications    sodium chloride flush  5-40 mL IntraVENous 2 times per day    docusate sodium  100 mg Oral Nightly    pantoprazole  40 mg Oral QAM AC    albuterol  2.5 mg Nebulization 4x daily    polyvinyl alcohol  1 drop Left Eye TID    carbidopa-levodopa  1 tablet Oral BID    furosemide  20 mg Oral Daily    metoprolol tartrate  25 mg Oral BID    sodium chloride flush  10 mL IntraVENous 2 times per day     PRN Meds: perflutren lipid microspheres, sodium chloride flush, sodium chloride, ondansetron **OR** ondansetron, trimethobenzamide, traMADol, sodium chloride flush, sodium chloride, potassium chloride **OR** potassium alternative oral replacement **OR** potassium chloride, magnesium hydroxide, acetaminophen **OR** acetaminophen      Intake/Output Summary (Last 24 hours) at 6/9/2022 1850  Last data filed at 6/9/2022 1435  Gross per 24 hour   Intake 530 ml   Output    Net 530 ml       Exam:    BP (!) 128/57   Pulse 60   Temp 97 °F (36.1 °C) (Temporal)   Resp 16   Ht 5' 1\" (1.549 m)   Wt 61 lb 11.7 oz (28 kg)   SpO2 97%   BMI 11.66 kg/m²           Gen: *well developed  HEENT: NC/AT, moist mucous membranes, no oropharyngeal erythema or exudate  Neck: supple, trachea midline, no anterior cervical or SC LAD  Heart:  Normal s1/s2, RRR,  Lungs:  CTA* bilaterally,  Abd: bowel sounds present, soft, nontender, nondistended, no masses  Extrem:  No clubbing, cyanosis,  *pos* edema  Skin: no rashes or lesions  Psych: A & O x3  Neuro: grossly intact, moves all four extremities. Peripheral Pulses: *2+              Labs:   Recent Labs     06/07/22  0538 06/09/22  0550   WBC 7.0 10.2   HGB 9.4* 9.1*   HCT 30.0* 29.6*    205     Recent Labs     06/07/22  0538 06/08/22  0618 06/09/22  0550    141 139   K 3.8 3.7 4.3    101 102   CO2 24 26 24   BUN 23 20 20   CREATININE 0.9 0.8 0.9   CALCIUM 9.2 9.1 8.8     No results for input(s): AST, ALT, BILIDIR, BILITOT, ALKPHOS in the last 72 hours. Recent Labs     06/07/22  1628   INR 1.2     No results for input(s): [place] in the last 72 hours. No results for input(s): AST, ALT, ALB, BILIDIR, BILITOT, ALKPHOS in the last 72 hours. No results for input(s): LACTA in the last 72 hours. No results found for: Sherrie Kast  No results found for: AMMONIA    Assessment:    Active Hospital Problems    Diagnosis Date Noted    [facility] [S32.030A]      Priority: Medium    Lumbar compression fracture, closed, initial encounter ([facility] [S32.000A] 08/07/2019   PAF  MOIST COUGH  EDEMA OF LEGS   [facility]KINSONS   HTN   PAF        PLAN:  CXR  NEUR SURGERY CONSULT  SINEMET  LOPRESSOR DIG   US OF LEGS PENDING        DVT Prophylaxis: SCD  Diet: ADULT DIET; Regular;  Low Sodium (2 gm)  Diet NPO  Code Status: DNR-CCA     PT/OT Eval Status: *ORDERED     Dispo - *SNF       Electronically signed by Adriana Rivera DO on 6/9/2022 at 6:50 PM [place]

## 2022-06-09 NOTE — PLAN OF CARE
Problem: Discharge Planning  Goal: Discharge to home or other facility with appropriate resources  6/9/2022 0556 by Rich Landon RN  Outcome: Progressing  6/8/2022 1749 by Myesha Levy RN  Outcome: Progressing     Problem: Safety - Adult  Goal: Free from fall injury  6/9/2022 0556 by Rich Landon RN  Outcome: Progressing  6/8/2022 1749 by Myesha Levy RN  Outcome: Progressing     Problem: Skin/Tissue Integrity  Goal: Absence of new skin breakdown  6/9/2022 0556 by Rich Landon RN  Outcome: Progressing  6/8/2022 1749 by Myesha Levy RN  Outcome: Progressing

## 2022-06-09 NOTE — CARE COORDINATION
Patient is POD #1 Kyphoplastiy  L1 L3 L4 transferred to intermediate due to atrial fib and Cardiology consulted. Met with daughter ,Peg at bedside to further discuss transition of care. Plan is [address]. Jearline Fleischer updated. Will not need precert. Will need t do HENS  ambulance form in envelope in soft chart. Will need covid prior to discharge. CM/SW will continue to follow.

## 2022-06-09 NOTE — PROGRESS NOTES
Department of Neurosurgery  Progress Note    CHIEF COMPLAINT: s/p L1, L3 and L4 kyphoplasty 6/8    SUBJECTIVE:  Pre op back pain improved. No new issues overnight. REVIEW OF SYSTEMS :  Constitutional: Negative for chills and fever. Neurological: Negative for dizziness, tremors and speech change.      OBJECTIVE:   VITALS:  BP (!) 128/57   Pulse 74   Temp 97 °F (36.1 °C) (Temporal)   Resp 16   Ht 5' 1\" (1.549 m)   Wt 61 lb 11.7 oz (28 kg)   SpO2 90%   BMI 11.66 kg/m²     PHYSICAL:  Neurologic: Alert and oriented x3; PERRL  Motor Exam:  Motor exam is symmetrical 5 out of 5 all extremities bilaterally  Sensory:  Sensory intact  Incision c/d/i      DATA:  CBC:   Lab Results   Component Value Date    WBC 10.2 06/09/2022    RBC 2.61 06/09/2022    HGB 9.1 06/09/2022    HCT 29.6 06/09/2022    .4 06/09/2022    MCH 34.9 06/09/2022    MCHC 30.7 06/09/2022    RDW 14.7 06/09/2022     06/09/2022    MPV 11.1 06/09/2022     BMP:    Lab Results   Component Value Date     06/09/2022    K 4.3 06/09/2022    K 3.8 06/07/2022     06/09/2022    CO2 24 06/09/2022    BUN 20 06/09/2022    LABALBU 3.4 10/17/2021    CREATININE 0.9 06/09/2022    CALCIUM 8.8 06/09/2022    GFRAA >60 06/09/2022    LABGLOM 58 06/09/2022    GLUCOSE 122 06/09/2022     PT/INR:    Lab Results   Component Value Date    PROTIME 13.2 06/07/2022    INR 1.2 06/07/2022     PTT:    Lab Results   Component Value Date    APTT 66.1 07/16/2021   [APTT}    Current Inpatient Medications  Current Facility-Administered Medications: perflutren lipid microspheres (DEFINITY) injection 1.65 mg, 1.5 mL, IntraVENous, ONCE PRN  sodium chloride flush 0.9 % injection 5-40 mL, 5-40 mL, IntraVENous, 2 times per day  sodium chloride flush 0.9 % injection 5-40 mL, 5-40 mL, IntraVENous, PRN  0.9 % sodium chloride infusion, , IntraVENous, PRN  ondansetron (ZOFRAN-ODT) disintegrating tablet 4 mg, 4 mg, Oral, Q8H PRN **OR** ondansetron (ZOFRAN) injection 4 mg, 4 mg, IntraVENous, Q6H PRN  docusate sodium (COLACE) capsule 100 mg, 100 mg, Oral, Nightly  pantoprazole (PROTONIX) tablet 40 mg, 40 mg, Oral, QAM AC  trimethobenzamide (TIGAN) injection 200 mg, 200 mg, IntraMUSCular, Q6H PRN  albuterol (PROVENTIL) nebulizer solution 2.5 mg, 2.5 mg, Nebulization, 4x daily  polyvinyl alcohol (LIQUIFILM TEARS) 1.4 % ophthalmic solution 1 drop, 1 drop, Left Eye, TID  carbidopa-levodopa (SINEMET)  MG per tablet 1 tablet, 1 tablet, Oral, BID  furosemide (LASIX) tablet 20 mg, 20 mg, Oral, Daily  metoprolol tartrate (LOPRESSOR) tablet 25 mg, 25 mg, Oral, BID  traMADol (ULTRAM) tablet 50 mg, 50 mg, Oral, Q6H PRN  sodium chloride flush 0.9 % injection 10 mL, 10 mL, IntraVENous, 2 times per day  sodium chloride flush 0.9 % injection 10 mL, 10 mL, IntraVENous, PRN  0.9 % sodium chloride infusion, , IntraVENous, PRN  potassium chloride (KLOR-CON M) extended release tablet 40 mEq, 40 mEq, Oral, PRN **OR** potassium bicarb-citric acid (EFFER-K) effervescent tablet 40 mEq, 40 mEq, Oral, PRN **OR** potassium chloride 10 mEq/100 mL IVPB (Peripheral Line), 10 mEq, IntraVENous, PRN  ondansetron (ZOFRAN-ODT) disintegrating tablet 4 mg, 4 mg, Oral, Q8H PRN **OR** ondansetron (ZOFRAN) injection 4 mg, 4 mg, IntraVENous, Q6H PRN  magnesium hydroxide (MILK OF MAGNESIA) 400 MG/5ML suspension 30 mL, 30 mL, Oral, Daily PRN  acetaminophen (TYLENOL) tablet 650 mg, 650 mg, Oral, Q6H PRN **OR** acetaminophen (TYLENOL) suppository 650 mg, 650 mg, Rectal, Q6H PRN    ASSESSMENT:   s/p L1, L3 and L4 kyphoplasty 6/8    PLAN:  -Pain control  -PT/OT  -Follow up in neurosurgery clinic in 4 weeks      Electronically signed by Louis Hunter PA-C on 6/9/2022 at 12:57 PM

## 2022-06-10 NOTE — PROGRESS NOTES
Notified Dr. Shauna Desai that neurosurgery gave okay to start eliquis. Sanya Desai for discharge order.

## 2022-06-10 NOTE — PROGRESS NOTES
Inpatient Cardiology Consultation follow-up visit    Reason for Consult:  AF    Consulting Physician: Dr. Taniya Schafer    Requesting Physician:  Dr. Ritika Worthington    Date of Consultation: 6/10/2022    Sleeping comfortably without any acute distress      Past Medical History:    1. Lifelong non smoker  2. HTN  3. Anemia  4. 1997 R TKA  5. Fragility  6. Hystrectomy  7. 11/2015 Hip hip fracture s.p ORIF  8. Parkinson's  9. Cataract surgery  10. 6/10/2019 T12 kyphoplasty  11. Admission 8/1/2019-8/7/2019 due to incarcerated small bowel hernia with high grade obstruction. 12. 8/2/2019 diagnosed with AF [id number]. Patient is anticoagulated with Eliquis 2.5 mg twice daily. 13. 8/2019 2.8 second pause  14. 8/2/2019 TTE: EF 55-60%. Mild CLVH. Moderately dilated LA, AF. Mild MR/AI  15. 8/5/2019 left inguinal herniorrhaphy with mesh placement   16. 8/7/2019 AF with 2.8 second pause  17. 8/8/2019  T11, L2 kyphoplasty and bone biopsy  18. DNR-CCA  19. Status post balloon kyphoplasty L1, L3 and L4 with neurosurgery on 6/8/2022. Medications Prior to admit:  Prior to Admission medications    Medication Sig Start Date End Date Taking? Authorizing Provider   albuterol (PROVENTIL) (2.5 MG/3ML) 0.083% nebulizer solution Take 3 mLs by nebulization 4 times daily 6/10/22  Yes Ghazala Tam, DO   guaiFENesin-dextromethorphan (ROBITUSSIN DM) 100-10 MG/5ML syrup Take 5 mLs by mouth every 4 hours as needed for Cough 6/10/22 6/20/22 Yes Ghazala Tam, DO   traMADol (ULTRAM) 50 MG tablet Take 1 tablet by mouth every 6 hours as needed for Pain for up to 3 days.  6/10/22 6/13/22 Yes Geovanni Joshi, DO   doxycycline hyclate (VIBRAMYCIN) 100 mg capsule Take 100 mg by mouth 2 times daily   Yes Historical Provider, MD   furosemide (LASIX) 20 MG tablet Take 1 tablet by mouth daily 5/11/22   Chino Galeana MD   digoxin 62.5 MCG TABS Take 62.5 mcg by mouth daily 10/20/21   Scot Savage MD   apixaban (ELIQUIS) 2.5 MG TABS tablet Take by mouth 2 times daily    Historical Provider, MD   acetaminophen (APAP EXTRA STRENGTH) 500 MG tablet Take 2 tablets by mouth 2 times daily 8/31/20   Saul Roy DO   metoprolol tartrate (LOPRESSOR) 25 MG tablet Take 1 tablet by mouth 2 times daily 8/9/19   Ke Jesus MD   Artificial Tear Solution (SOOTHE XP) SOLN Place 1 drop into the left eye 3 times daily    Historical Provider, MD   Cholecalciferol (VITAMIN D3) 1000 UNITS TABS Take 1 tablet by mouth every morning     Historical Provider, MD   carbidopa-levodopa (SINEMET)  MG per tablet Take 1 tablet by mouth 2 times daily     Historical Provider, MD       Current Medications:    Current Facility-Administered Medications: guaiFENesin-dextromethorphan (ROBITUSSIN DM) 100-10 MG/5ML syrup 5 mL, 5 mL, Oral, Q4H PRN  apixaban (ELIQUIS) tablet 2.5 mg, 2.5 mg, Oral, BID  perflutren lipid microspheres (DEFINITY) injection 1.65 mg, 1.5 mL, IntraVENous, ONCE PRN  sodium chloride flush 0.9 % injection 5-40 mL, 5-40 mL, IntraVENous, 2 times per day  sodium chloride flush 0.9 % injection 5-40 mL, 5-40 mL, IntraVENous, PRN  0.9 % sodium chloride infusion, , IntraVENous, PRN  ondansetron (ZOFRAN-ODT) disintegrating tablet 4 mg, 4 mg, Oral, Q8H PRN **OR** ondansetron (ZOFRAN) injection 4 mg, 4 mg, IntraVENous, Q6H PRN  docusate sodium (COLACE) capsule 100 mg, 100 mg, Oral, Nightly  pantoprazole (PROTONIX) tablet 40 mg, 40 mg, Oral, QAM AC  trimethobenzamide (TIGAN) injection 200 mg, 200 mg, IntraMUSCular, Q6H PRN  albuterol (PROVENTIL) nebulizer solution 2.5 mg, 2.5 mg, Nebulization, 4x daily  polyvinyl alcohol (LIQUIFILM TEARS) 1.4 % ophthalmic solution 1 drop, 1 drop, Left Eye, TID  carbidopa-levodopa (SINEMET)  MG per tablet 1 tablet, 1 tablet, Oral, BID  furosemide (LASIX) tablet 20 mg, 20 mg, Oral, Daily  metoprolol tartrate (LOPRESSOR) tablet 25 mg, 25 mg, Oral, BID  traMADol (ULTRAM) tablet 50 mg, 50 mg, Oral, Q6H PRN  sodium chloride flush 0.9 % injection 10 mL, 10 mL, IntraVENous, 2 times per day  sodium chloride flush 0.9 % injection 10 mL, 10 mL, IntraVENous, PRN  0.9 % sodium chloride infusion, , IntraVENous, PRN  potassium chloride (KLOR-CON M) extended release tablet 40 mEq, 40 mEq, Oral, PRN **OR** potassium bicarb-citric acid (EFFER-K) effervescent tablet 40 mEq, 40 mEq, Oral, PRN **OR** potassium chloride 10 mEq/100 mL IVPB (Peripheral Line), 10 mEq, IntraVENous, PRN  magnesium hydroxide (MILK OF MAGNESIA) 400 MG/5ML suspension 30 mL, 30 mL, Oral, Daily PRN  acetaminophen (TYLENOL) tablet 650 mg, 650 mg, Oral, Q6H PRN **OR** acetaminophen (TYLENOL) suppository 650 mg, 650 mg, Rectal, Q6H PRN    Allergies:  Pcn [penicillins] (rash)    Social History:    Lifelong non-smoker, denies alcohol and illicit drug use. Resides with her son at home. CODE STATUS DNR CCA. Family History: Noncontributory due to advanced age. REVIEW OF SYSTEMS:     · Constitutional: + fatigue. Denies fevers, chills or night sweats  · Eyes: Denies visual changes or drainage  · ENT: Denies headaches or hearing loss. No mouth sores or sore throat. No epistaxis   · Cardiovascular: See HPI. No lower extremity swelling. · Respiratory: Denies ARRIOLA, cough, orthopnea or PND. No hemoptysis   · Gastrointestinal: Denies hematemesis or anorexia. No hematochezia or melena    · Genitourinary: Denies urgency, dysuria or hematuria. · Musculoskeletal: See HPI. · Integumentary: Denies rash, hives or pruritis   · Neurological: Denies dizziness, headaches or seizures. No numbness or tingling  · Psychiatric: Denies anxiety or depression. · Endocrine: Denies temperature intolerance. No recent weight change. .  · Hematologic/Lymphatic: Denies abnormal bruising or bleeding.  No swollen lymph nodes    PHYSICAL EXAM:   /66   Pulse 76   Temp 97.6 °F (36.4 °C) (Temporal)   Resp 16   Ht 5' 1\" (1.549 m)   Wt 75 lb 9.9 oz (34.3 kg)   SpO2 95%   BMI 14.29 kg/m²   CONST: Early, frail  female who appears of stated age. Awake, alert and cooperative. No apparent distress. HEENT:   Head- Normocephalic, atraumatic   Eyes- Conjunctivae pink, anicteric  Throat- Oral mucosa pink and moist  Neck-  No stridor, trachea midline, no jugular venous distention. No carotid bruit. CHEST: Chest symmetrical and non-tender to palpation. No accessory muscle use or intercostal retractions  RESPIRATORY: Lung sounds -diminished in bases. CARDIOVASCULAR:     Heart Inspection- shows no noted pulsations  Heart Palpation- no heaves or thrills; PMI is non-displaced   Heart Ausculation-IRR, no murmur. No s3 or rub   PV: No lower extremity edema. No varicosities. Pedal pulses palpable, no clubbing or cyanosis   ABDOMEN: Soft, non-tender to light palpation. Bowel sounds present. No palpable masses no organomegaly; no abdominal bruit  MS: Good muscle strength and tone. No atrophy or abnormal movements. : Pure wick catheter draining clear yellow urine. SKIN: Warm and dry no statis dermatitis or ulcers   NEURO / PSYCH: Oriented to person, place and time. Speech clear and appropriate. Follows all commands. Hard of hearing. DATA:    ECG: See HPI. Tele strips: Afib with CVR. Diagnostic:      Intake/Output Summary (Last 24 hours) at 6/10/2022 1803  Last data filed at 6/10/2022 1404  Gross per 24 hour   Intake 680 ml   Output 250 ml   Net 430 ml       Labs:   CBC:   Recent Labs     06/09/22  0550 06/10/22  0512   WBC 10.2 7.1   HGB 9.1* 8.1*   HCT 29.6* 26.4*    170     BMP:   Recent Labs     06/09/22  0550 06/10/22  0512    137   K 4.3 4.2   CO2 24 25   BUN 20 23   CREATININE 0.9 1.1*   LABGLOM 58 46   CALCIUM 8.8 8.8     TFT:   Lab Results   Component Value Date    TSH 0.782 06/09/2022    T4FREE 1.70 10/15/2021      PT/INR:   No results for input(s): PROTIME, INR in the last 72 hours.   CT thoracic spine without contrast: 6/6/2022:  No acute fracture, chronic compressive fracture deformities in the lower thoracic spine, resulting in exaggerated thoracic kyphosis, multilevel kyphoplasty changes. CT of the lumbar spine: 2022:  Age indeterminate, possibly acute or subacute compression fracture deformity of L3 vertebral body with approximately 50% loss of height, chronic compression fracture deformities at L1, L2 and L4. MRI thoracic spine without contrast: 2022:  Acute to subacute compression fraction deformities of L1, L3 and L4. No significant retropulsion. Multiple remote compression fracture deformities since cement augmentation changes throughout the thoracic spine. MRI lumbar spine without contrast: 2022:  Acute to subacute compression fraction deformities of L1, L3 and L4, no significant retropulsion, multiple remote compression fracture deformities. Chest x-ray: 2022:  No acute process. Ultrasound Doppler lower extremities: 2022: No evidence of DVT. Prior cardiac testin2019 TTE: EF 55-60%. Mild CLVH. Moderately dilated LA, AF. Mild MR/AI    No prior LHC or stress test.    Transthoracic echocardiogram 2022  Ejection fraction is visually estimated at 60 to 65%. The left atrium is severely dilated. Mildly dilated right ventricle with normal systolic function. Markedly enlarged right atrium size. Mild thickening of the mitral valve leaflets. Mild to moderate mitral regurgitation is present. The aortic valve appears mild to moderately sclerotic. Mild aortic regurgitation is noted. No hemodynamically significant aortic stenosis is present. Severe tricuspid regurgitation. RVSP is 46 mmHg. Assessment/plan:  1. Admitted (2022) with low back pain with acute to subacute compression fracture deformities of L1, L3 and L4 status post kyphoplasty 2022. History of kyphoplasty (2019 and 2021). Management per primary service and neurosurgery  2.  Permanent AF  On chronic OAC (Eliquis)  Avoid fall and activities that may lead to bleeding  Continue beta-blocker for rate control  3. Secondary hypercoagulable state  4. Severe tricuspid regurgitation   Follow-up with cardiology in the outpatient   Echo shows normal LV systolic function as shown above   Follow-up with cardiology when ready to be discharged  5. Hypertension  6. CKD: Stable. Creatinine 0.9 (today). 7. Frail /mostly sedentary  8. Parkinson's disease  9. Chronic anemia  10. History of left inguinal herniorrhaphy and GERD soft mesh (8/2021). Cardiology will sign off for now. Please call with questions.

## 2022-06-10 NOTE — CARE COORDINATION
Patient remains on telemetry. C/O croupy cough, Robitussin ordered. O2 continues at 2L with pox 97%. Await cardiology consult . Discharge plan remains to [address] Ambulance form in envelope in soft chart. Hens started and will need completed after discharge order written. Covid needed day of discharge. WILIAN completed. CM/SW will continue to follow.

## 2022-06-10 NOTE — DISCHARGE INSTR - COC
Continuity of Care Form    Patient Name: Milton Iverson   :  1928  MRN:  [id number]    Admit date:  2022  Discharge date:  6/10/22    Code Status Order: DNR-CCA   Advance Directives:      Admitting Physician:  Tonya Miramontes DO  PCP: Marcelino Wagner MD    Discharging Nurse: [facility] Unit/Room#: 6466/3343-Z  Discharging Unit Phone Number: [phone]    Emergency Contact:   Extended Emergency Contact Information  Primary Emergency Contact: Farshad Ghotra  Address: [address]           [place], [address] Phone: [phone]  Mobile Phone: [phone]  Relation: Child  Secondary Emergency Contact: [address] Phone: [phone]  Mobile Phone: [phone]  Relation: Child  Preferred language: English   needed? No    Past Surgical History:  Past Surgical History:   Procedure Laterality Date    CATARACT REMOVAL WITH IMPLANT Left 2016    CHOLECYSTECTOMY, LAPAROSCOPIC N/A 10/15/2021    LAPAROSCOPIC ROBOTIC ASSISTED CHOLECYSTECTOMY performed by Clay Padilla MD at [facility] Left 2019    LEFT INGUINAL HERNIA REPAIR performed by Kane Wilder MD at [email or web address] Right 2015    ORIF left hip inter & subtrochanteric fx. w/long gamma nail. Yodit Valenzuela MD    HYSTERECTOMY (CERVIX STATUS UNKNOWN)  ?     robotic    KYPHOSIS SURGERY  2017    T8    KYPHOSIS SURGERY      KYPHOSIS SURGERY N/A 6/10/2019    T11 VERTEBRAL BODY BIOPSY & KYPHOPLASTY performed by Haja Mccormack MD at [facility] 2019    KYPHOPLASTY T11, L2 performed by Haja Mccormack MD at [facility] 2019    POSSIBLE LAPAROTOMY  POSSIBLE BOWEL RESECTION performed by Kane Wilder MD at [address] [place] [address] 2022    L1, L3, L4 KYPHOPLASTY-NEEDS 2C-ARM performed by Haja Mccormack MD at [facility] 1997    Right DARELL.  Del Lin.  Elsa Samuel MD       Immunization History:   Immunization History   Administered Date(s) Administered    Td, unspecified formulation 2012       Active Problems:  Patient Active Problem List   Diagnosis Code    Subtrochanteric fracture of right femur ([facility] S72.21XA    Park[facility]'s disease ([facility] G20    Renal insufficiency N28.9    Hypertension I10    Abdominal pain R10.9    Chronic bilateral thoracic back pain M54.6, G89.29    Thoracic compression fracture, closed, initial encounter ([facility] S22.000A    SBO (small bowel obstruction) ([facility] K56.609    Red blood cell antibody positive R76.8    Moderate protein-calorie malnutrition (HCC) E44.0    Compression fracture of L2 vertebra ([facility]) S32.020A    Lumbar compression fracture, closed, initial encounter ([facility] S32.000A    Paroxysmal atrial fibrillation ([facility]) I48.0    Low back pain M54.50    Primary osteoarthritis of left knee M17.12    Back pain M54.9    H/O cholelithiasis Z87.19    Atrial fibrillation with RVR ([facility]) I48.91    Chronic heart failure with preserved ejection fraction ([facility]) I50.32    Compression fracture of third lumbar vertebra ([facility] S32.030A       Isolation/Infection:   Isolation            No Isolation          Patient Infection Status       Infection Onset Added Last Indicated Last Indicated By Review Planned Expiration Resolved Resolved By    None active    Resolved    C-diff Rule Out 22 CLOSTRIDIUM DIFFICILE EIA (Ordered)   22 Erick David RN    Canceled Lady Lakshmi RN 22 1016     COVID-19 22 Covid-19 Ambulatory   22             Nurse Assessment:  Last Vital Signs: /69   Pulse 75   Temp 97.8 °F (36.6 °C) (Temporal)   Resp 18   Ht 5' 1\" (1.549 m)   Wt 75 lb 9.9 oz (34.3 kg)   SpO2 97%   BMI 14.29 kg/m²     Last documented pain score (0-10 scale): Pain Level: 3  Last Weight:   Wt Readings from Last 1 Encounters:   06/10/22 75 lb 9.9 oz (34.3 kg) Mental Status:  oriented, alert, thought processes intact, and able to concentrate and follow conversation    IV Access:  - None    Nursing Mobility/ADLs:  Walking   Dependent  Transfer  Dependent  Bathing  Dependent  Dressing  Dependent  Toileting  Dependent  Feeding  Assisted  Med Admin  Assisted  Med Delivery   whole and prefers mixed with [facility] Documentation and Therapy:  Incision 01/11/17 Back Mid;Upper (Active)   Number of days: 1975       Puncture 06/08/22 Back (Active)   Wound Assessment Clean;Dry; Intact 06/08/22 1430   Drainage Amount None 06/08/22 1430   Dressing/Treatment Adhesive bandage 06/08/22 1430   Number of days: 2       Incision 06/08/22 Back Medial (Active)   Dressing Status Clean;Dry; Intact 06/10/22 0800   Dressing/Treatment Band-Aid/adhesive bandage 06/09/22 2115   Incision Length (cm) 1 06/08/22 1645   Incision Width (cm) 0.5 cm 06/08/22 1645   Incision Depth (cm) 0.01 cm 06/08/22 1645   Closure Surgical glue 06/10/22 0800   Incision Assessment Dry 06/09/22 2115   Drainage Amount None 06/09/22 2115   Odor None 06/09/22 2115   Number of days: 1       Incision 06/10/19 Back (Active)   Number of days: 1095       Incision 08/05/19 Abdomen Left; Lower (Active)   Number of days: 1040       Incision 08/08/19 Back Medial (Active)   Number of days: 1037       Incision 10/15/21 Abdomen Medial;Upper (Active)   Number of days: 237        Elimination:  Continence: Bowel: Yes  Bladder: Yes  Urinary Catheter: None   Colostomy/Ileostomy/Ileal Conduit: No       Date of Last BM: 6/9/22    Intake/Output Summary (Last 24 hours) at 6/10/2022 0845  Last data filed at 6/10/2022 0400  Gross per 24 hour   Intake 710 ml   Output 0 ml   Net 710 ml     I/O last 3 completed shifts: In: 200 [P.O.:770]  Out: 0     Safety Concerns:      At Risk for Falls    Impairments/Disabilities:      Vision and Hearing    Nutrition Therapy:  Current Nutrition Therapy:   - Oral Diet:  General and easy to chew    Routes of Feeding: Oral  Liquids: No Restrictions  Daily Fluid Restriction: no  Last Modified Barium Swallow with Video (Video Swallowing Test): not done    Treatments at the Time of Hospital Discharge:   Respiratory Treatments: N/A  Oxygen Therapy:  is on oxygen at 2 L/min per nasal cannula. Ventilator:    - No ventilator support    Rehab Therapies: Physical Therapy and Occupational Therapy  Weight Bearing Status/Restrictions: No weight bearing restrictions  Other Medical Equipment (for information only, NOT a DME order):  wheelchair, walker, bath bench, bedside commode, and hospital bed  Other Treatments: N/A    Patient's personal belongings (please select all that are sent with patient):  Glasses    RN SIGNATURE:  Electronically signed by Lionel Hall RN on 6/10/22 at 1:26 PM EDT    CASE MANAGEMENT/SOCIAL WORK SECTION    Inpatient Status Date: ***    Readmission Risk Assessment Score:  Readmission Risk              Risk of Unplanned Readmission:  13           Discharging to Facility/ Agency   Name: [facility]  sub acute rehab  [address]  Phone: [phone] [address] [place] (if applicable)   Name:  Address:  Dialysis Schedule:  Phone:  Fax:    / signature:  Electronically signed by Uriel Odell RN on 6/10/2022 at 8:47 AM        PHYSICIAN SECTION    Prognosis: {Prognosis:6663285499}    Condition at Discharge: [address] Patient Condition:[id number]}    Rehab Potential (if transferring to Rehab): {Prognosis:5721453358}    Recommended Labs or Other Treatments After Discharge: ***    Physician Certification: I certify the above information and transfer of Vee Hargrove  is necessary for the continuing treatment of the diagnosis listed and that she requires {Admit to Appropriate Level of Care:61085} for {GREATER/LESS:438682395} 30 days.      Update Admission H&P: {CHP DME Changes in STWPH:598675654}    PHYSICIAN SIGNATURE:  Electronically signed by Dina Reyna DO on 6/10/22 at 12:08 PM EDT

## 2022-06-10 NOTE — PROGRESS NOTES
Pt has weak croupy cough. Pt refuses mucinex because she can not swallow it. Spoke with Dr Donna Arcos. Ok'd prn order for Robitussin.  SOCORRO

## 2022-06-10 NOTE — PROGRESS NOTES
Hospitalist Progress Note      PCP: Hector Shah MD    Date of Admission: 6/6/2022        Hospital Course:  *80 y.o. female presented with BACK PAIN, SON SAYS MOM DID NOT FALL, HOWEVER SHE MOVED OR TURNED OVER IN BED AND FELT SEVERE BACK ALMENDAREZ, FOUND TO HAVE COMPRESSION FRACTURES L3 compression fx** kyphoplasty on 6.8, went into a fib* dig discontinued per card.   ELIQUIS RESTARTED   **        Subjective: *TO [facility] TODAY, NO COMPLAINTS          Medications:  Reviewed    Infusion Medications    sodium chloride      sodium chloride       Scheduled Medications    apixaban  2.5 mg Oral BID    sodium chloride flush  5-40 mL IntraVENous 2 times per day    docusate sodium  100 mg Oral Nightly    pantoprazole  40 mg Oral QAM AC    albuterol  2.5 mg Nebulization 4x daily    polyvinyl alcohol  1 drop Left Eye TID    carbidopa-levodopa  1 tablet Oral BID    furosemide  20 mg Oral Daily    metoprolol tartrate  25 mg Oral BID    sodium chloride flush  10 mL IntraVENous 2 times per day     PRN Meds: guaiFENesin-dextromethorphan, perflutren lipid microspheres, sodium chloride flush, sodium chloride, ondansetron **OR** ondansetron, trimethobenzamide, traMADol, sodium chloride flush, sodium chloride, potassium chloride **OR** potassium alternative oral replacement **OR** potassium chloride, magnesium hydroxide, acetaminophen **OR** acetaminophen      Intake/Output Summary (Last 24 hours) at 6/10/2022 1744  Last data filed at 6/10/2022 1404  Gross per 24 hour   Intake 680 ml   Output 250 ml   Net 430 ml       Exam:    /66   Pulse 76   Temp 97.6 °F (36.4 °C) (Temporal)   Resp 16   Ht 5' 1\" (1.549 m)   Wt 75 lb 9.9 oz (34.3 kg)   SpO2 95%   BMI 14.29 kg/m²     Gen: *well developed  HEENT: NC/AT, moist mucous membranes, no oropharyngeal erythema or exudate  Neck: supple, trachea midline, no anterior cervical or SC LAD  Heart:  Normal s1/s2, RRR,  Lungs:  CTA* bilaterally,  Abd: bowel sounds present, soft, nontender, nondistended, no masses  Extrem:  No clubbing, cyanosis,  *pos* edema  Skin: no rashes or lesions  Psych: A & O x3  Neuro: grossly intact, moves all four extremities. Peripheral Pulses: *2+                  Labs:   Recent Labs     06/09/22  0550 06/10/22  0512   WBC 10.2 7.1   HGB 9.1* 8.1*   HCT 29.6* 26.4*    170     Recent Labs     06/08/22  0618 06/09/22  0550 06/10/22  0512    139 137   K 3.7 4.3 4.2    102 103   CO2 26 24 25   BUN 20 20 23   CREATININE 0.8 0.9 1.1*   CALCIUM 9.1 8.8 8.8     No results for input(s): AST, ALT, BILIDIR, BILITOT, ALKPHOS in the last 72 hours. No results for input(s): INR in the last 72 hours. No results for input(s): [address] in the last 72 hours. No results for input(s): AST, ALT, ALB, BILIDIR, BILITOT, ALKPHOS in the last 72 hours. No results for input(s): LACTA in the last 72 hours.   No results found for: Maria Dolores Lin  No results found for: AMMONIA    Assessment:    Active Hospital Problems    Diagnosis Date Noted    [facility] [K07.584V]      Priority: Medium    Lumbar compression fracture, closed, initial encounter ([facility] [S32.000A] 08/07/2019   PAF  MOIST COUGH  EDEMA OF LEGS   PARKINSONS   HTN   S/P KYPHOPLASTY    Plan:  *DC TO [facility] TODAY    Electronically signed by Ruba Weeks DO on 6/10/2022 at 5:44 PM Emilee Verde

## 2022-06-10 NOTE — CARE COORDINATION
Patient remains on telemetry. C/O croupy cough, Robitussin ordered. O2 continues at 2L with pox 97%. Await cardiology consult . Discharge plan remains to [address] Ambulance form in envelope in soft chart. Hens started and will need completed after discharge order written. Covid needed day of discharge. WILIAN completed. CM/SW will continue to follow.  Covid negative, Hens completed ambulance and hens in envelope in soft chart

## 2022-06-10 NOTE — PLAN OF CARE
Problem: Discharge Planning  Goal: Discharge to home or other facility with appropriate resources  Outcome: Progressing     Problem: Safety - Adult  Goal: Free from fall injury  Outcome: Progressing     Problem: Skin/Tissue Integrity  Goal: Absence of new skin breakdown  Description: 1. Monitor for areas of redness and/or skin breakdown  2. Assess vascular access sites hourly  3. Every 4-6 hours minimum:  Change oxygen saturation probe site  4. Every 4-6 hours:  If on nasal continuous positive airway pressure, respiratory therapy assess nares and determine need for appliance change or resting period.   Outcome: Progressing     Problem: Chronic Conditions and Co-morbidities  Goal: Patient's chronic conditions and co-morbidity symptoms are monitored and maintained or improved  Outcome: Progressing     Problem: Cardiovascular - Adult  Goal: Maintains optimal cardiac output and hemodynamic stability  Outcome: Progressing     Problem: Metabolic/Fluid and Electrolytes - Adult  Goal: Hemodynamic stability and optimal renal function maintained  Outcome: Progressing     Problem: Pain  Goal: Verbalizes/displays adequate comfort level or baseline comfort level  Outcome: Progressing

## 2022-06-13 NOTE — DISCHARGE SUMMARY
Hospitalist Discharge Summary    Patient ID: Yosvany Randall   Patient : 1928  Patient's PCP: Srikanth Blackwell MD    Admit Date: 2022   Admitting Physician: Jeffery Mahoney DO    Discharge Date:  2022   Discharge Physician: Jeffery Mahoney DO   Discharge Condition: Stable  Discharge Disposition: Skilled Facility      Discharge Diagnoses: Active Hospital Problems    Diagnosis Date Noted    [facility] [S32.030A]      Priority: Medium    Lumbar compression fracture, closed, initial encounter ([facility] [S32.000A] 2019   PAF  MOIST COUGH  EDEMA OF LEGS   PARKINSONS   HTN   S/P KYPHOPLASTY        Hospital course in brief:  80 y. o. female presented with BACK PAIN, SON SAYS MOM DID NOT FALL, HOWEVER SHE MOVED OR TURNED OVER IN BED AND FELT SEVERE BACK ALMENDAREZ, FOUND TO HAVE COMPRESSION FRACTURES L3 compression fx** kyphoplasty on , went into a fib* dig discontinued per card.  ELIQUIS RESTARTED   **      PHYSICAL EXAM:    /66   Pulse 69   Temp 97.6 °F (36.4 °C) (Temporal)   Resp 16   Ht 5' 1\" (1.549 m)   Wt 75 lb 9.9 oz (34.3 kg)   SpO2 95%   BMI 14.29 kg/m²   Gen: *well developed  HEENT: NC/AT, moist mucous membranes, no oropharyngeal erythema or exudate  Neck: supple, trachea midline, no anterior cervical or SC LAD  Heart:  Normal s1/s2, RRR,  Lungs:  CTA* bilaterally,  Abd: bowel sounds present, soft, nontender, nondistended, no masses  Extrem:  No clubbing, cyanosis,  *pos* edema  Skin: no rashes or lesions  Psych: A & O x3  Neuro: grossly intact, moves all four extremities.    Peripheral Pulses: *2+         Prior to Admission medications    Medication Sig Start Date End Date Taking?  Authorizing Provider   albuterol (PROVENTIL) (2.5 MG/3ML) 0.083% nebulizer solution Take 3 mLs by nebulization 4 times daily 6/10/22  Yes Vadim Tam DO   guaiFENesin-dextromethorphan (ROBITUSSIN DM) 100-10 MG/5ML syrup Take 5 mLs by mouth every 4 hours as needed for Cough 6/10/22 6/20/22 Yes Geovanni Hernandez DO   traMADol (ULTRAM) 50 MG tablet Take 1 tablet by mouth every 6 hours as needed for Pain for up to 3 days. 6/10/22 6/13/22 Yes Geovanni Hernandez DO   doxycycline hyclate (VIBRAMYCIN) 100 mg capsule Take 100 mg by mouth 2 times daily   Yes Historical Provider, MD   furosemide (LASIX) 20 MG tablet Take 1 tablet by mouth daily 5/11/22   Monroe Christine MD   digoxin 62.5 MCG TABS Take 62.5 mcg by mouth daily 10/20/21   Harsha Marques MD   apixaban (ELIQUIS) 2.5 MG TABS tablet Take by mouth 2 times daily    Historical Provider, MD   acetaminophen (APAP EXTRA STRENGTH) 500 MG tablet Take 2 tablets by mouth 2 times daily 8/31/20   Sidney Colmenares DO   metoprolol tartrate (LOPRESSOR) 25 MG tablet Take 1 tablet by mouth 2 times daily 8/9/19   Gila Coe MD   Artificial Tear Solution (SOOTHE XP) SOLN Place 1 drop into the left eye 3 times daily    Historical Provider, MD   Cholecalciferol (VITAMIN D3) 1000 UNITS TABS Take 1 tablet by mouth every morning     Historical Provider, MD   carbidopa-levodopa (SINEMET)  MG per tablet Take 1 tablet by mouth 2 times daily     Historical Provider, MD       Consults:   IP CONSULT TO NEUROSURGERY  IP CONSULT TO HOSPITALIST  IP CONSULT TO SOCIAL WORK  IP CONSULT TO CARDIOLOGY            Discharge Instructions / Follow up:    Future Appointments   Date Time Provider Cristiana Sandoval   7/7/2022 11:45 AM Federica Mead PA-C [facility]       Continued appropriate risk factor modification of blood pressure, diabetes and serum lipids will remain essential to reducing risk of future atherosclerotic development    Activity: activity as tolerated    Significant labs:  CBC:   No results for input(s): WBC, RBC, HGB, HCT, MCV, RDW, PLT in the last 72 hours. BMP: No results for input(s): NA, K, CL, CO2, BUN, CREATININE, CA, MG, PHOS in the last 72 hours.   LFT:  No results for input(s): PROT, ALB, ALKPHOS, size.   Mitral Valve  Mild thickening of the mitral valve leaflets. Mild to moderate mitral regurgitation is present. Tricuspid Valve  Severe tricuspid regurgitation. RVSP is 46 mmHg. Aortic Valve  The aortic valve appears mild to moderately sclerotic. Mild aortic regurgitation is noted. No hemodynamically significant aortic stenosis is present. Conclusions   Summary  Ejection fraction is visually estimated at 60 to 65%. The left atrium is severely dilated. Mildly dilated right ventricle with normal systolic function. Markedly enlarged right atrium size. Mild thickening of the mitral valve leaflets. Mild to moderate mitral regurgitation is present. The aortic valve appears mild to moderately sclerotic. Mild aortic regurgitation is noted. No hemodynamically significant aortic stenosis is present. Severe tricuspid regurgitation. RVSP is 46 mmHg.    Signature   ----------------------------------------------------------------  Electronically signed by Citlalli Blackman MD(Interpreting  physician) on 06/10/2022 12:40 PM  ----------------------------------------------------------------  M-Mode/2D Measurements & Calculations   LV Diastolic    LV Systolic Dimension: 2.2   AV Cusp Separation: 1.7 cmLA  Dimension: 3.4  cm                           Dimension: 3.9 cmAO Root  cm              LV Volume Diastolic: 95.8 ml Dimension: 2.7 cm  LV FS:35.3 %    LV Volume Systolic: 19.9 ml  LV PW           LV EDV/LV EDV Index: 75.9  Diastolic: 0.9  [place]/11 LX/R^0TV ESV/LV ESV  cm              Index: 16.6 ml/15ml/ m^2     RV Diastolic Dimension: 3.9  Septum          EF Calculated: 65.8 %        cm  Diastolic: 0.9  LV Mass Index: 74 l/min*m^2  cm  CO: 4.11 l/min                               LA volume/Index: 88.4 ml  CI: 3.61        LVOT: 2 cm                   /77.57ml/m^2  l/m*m^2                                      RA Area: 30.1 cm^2  LV Mass: 84.88  g                                            IVC Expiration: 2.8 cm Doppler Measurements & Calculations    AV Peak Velocity: 0.94 m/s   LVOT Peak Velocity: 0.96 m/s   AV Peak Gradient: 3.5 mmHg   LVOT Mean Velocity: 0.54 m/s   AV Mean Velocity: 0.63 m/s   LVOT Peak Gradient: 3.7 mmHgLVOT Mean   AV Mean Gradient: 1.8 mmHg   Gradient: 1.5 mmHg   AV VTI: 18.7 cm              Estimated RVSP: 45.8 mmHg   AV Area (Continuity):3.06    Estimated RAP:3 mmHg   cm^2   AV Deceleration Time: 2528.1   msec                         TR Velocity:3.27 m/s   LVOT VTI: 18.2 cm            TR Gradient:42.82 mmHg                                PV Peak Velocity: 0.81 m/s   Estimated PASP: 45.82 mmHg   PV Peak Gradient: 2.64 mmHg                                PV Mean Velocity: 0.5 m/s                                PV Mean Gradient: 1.3 mmHg  [email or web address]? [id number]    XR CHEST INSPIRATION AND EXPIRATION    Result Date: 6/7/2022  EXAMINATION: ONE XRAY VIEW OF THE CHEST IN INSPIRATION 6/7/2022 6:42 pm COMPARISON: Chest x-ray 06/09/2019 HISTORY: ORDERING SYSTEM PROVIDED HISTORY: MOIST COUGH TECHNOLOGIST PROVIDED HISTORY: Reason for exam:->MOIST COUGH What reading provider will be dictating this exam?->CRC FINDINGS: Chronic lung changes are stable. No acute infiltrate is identified. On expiration, there is bibasilar compressive atelectasis noted. Pleural spaces are clear. The heart is enlarged. Ovoid gas density over the superior mediastinum is seen, which correlates with previously seen dilated patulous esophagus. Diffusely decreased bone mineralization is noted with multiple prior vertebroplasties seen. No acute cardiopulmonary process identified.      CT THORACIC SPINE WO CONTRAST    Result Date: 6/6/2022  EXAMINATION: CT OF THE THORACIC SPINE WITHOUT CONTRAST; CT OF THE LUMBAR SPINE WITHOUT CONTRAST  6/6/2022 2:18 pm: TECHNIQUE: CT of the thoracic spine was performed without the administration of intravenous contrast. Multiplanar reformatted images are provided for review. Automated exposure control, iterative reconstruction, and/or weight based adjustment of the mA/kV was utilized to reduce the radiation dose to as low as reasonably achievable.; CT of the lumbar spine was performed without the administration of intravenous contrast. Multiplanar reformatted images are provided for review. Adjustment of mA and/or kV according to patient size was utilized. Automated exposure control, iterative reconstruction, and/or weight based adjustment of the mA/kV was utilized to reduce the radiation dose to as low as reasonably achievable. COMPARISON: MRI of the thoracic and lumbar spine, 08/02/2021. HISTORY: ORDERING SYSTEM PROVIDED HISTORY: back pain TECHNOLOGIST PROVIDED HISTORY: Reason for exam:->back pain What reading provider will be dictating this exam?->CRC FINDINGS: CT OF THE THORACIC SPINE: BONES/ALIGNMENT: The bones are demineralized. No acute fracture is seen. There are chronic compression fracture deformities of the T8, T9, T10, T11 and T12 vertebral bodies, with mild exaggeration of the thoracic kyphosis. Kyphoplasty changes are noted at T8, T9, T11 and T12 levels. DEGENERATIVE CHANGES: Chronic retropulsion fracture fragments along the superior endplate of Q60 results in mild central canal stenosis. Mild to moderate neural foraminal stenoses are noted, most notably, (moderate) at the bilateral T10-11, right T11-12 and bilateral T12-L1 levels. SOFT TISSUES: No paraspinal mass is seen. CT OF THE LUMBAR SPINE: BONES/ALIGNMENT: Compared to the previous MRI from 08/02/2021, there is interval development of a fracture in the L3 vertebral body with approximately 50% loss of height. This fracture may be acute or subacute. There is mild progressive loss of height in the L1 vertebral body. Chronic compression fracture deformity with kyphoplasty changes are noted at L2.  Mild chronic compression based adjustment of the mA/kV was utilized to reduce the radiation dose to as low as reasonably achievable.; CT of the lumbar spine was performed without the administration of intravenous contrast. Multiplanar reformatted images are provided for review. Adjustment of mA and/or kV according to patient size was utilized. Automated exposure control, iterative reconstruction, and/or weight based adjustment of the mA/kV was utilized to reduce the radiation dose to as low as reasonably achievable. COMPARISON: MRI of the thoracic and lumbar spine, 08/02/2021. HISTORY: ORDERING SYSTEM PROVIDED HISTORY: back pain TECHNOLOGIST PROVIDED HISTORY: Reason for exam:->back pain What reading provider will be dictating this exam?->CRC FINDINGS: CT OF THE THORACIC SPINE: BONES/ALIGNMENT: The bones are demineralized. No acute fracture is seen. There are chronic compression fracture deformities of the T8, T9, T10, T11 and T12 vertebral bodies, with mild exaggeration of the thoracic kyphosis. Kyphoplasty changes are noted at T8, T9, T11 and T12 levels. DEGENERATIVE CHANGES: Chronic retropulsion fracture fragments along the superior endplate of O81 results in mild central canal stenosis. Mild to moderate neural foraminal stenoses are noted, most notably, (moderate) at the bilateral T10-11, right T11-12 and bilateral T12-L1 levels. SOFT TISSUES: No paraspinal mass is seen. CT OF THE LUMBAR SPINE: BONES/ALIGNMENT: Compared to the previous MRI from 08/02/2021, there is interval development of a fracture in the L3 vertebral body with approximately 50% loss of height. This fracture may be acute or subacute. There is mild progressive loss of height in the L1 vertebral body. Chronic compression fracture deformity with kyphoplasty changes are noted at L2. Mild chronic compression fracture deformity at L4. the bones are markedly demineralized. SOFT TISSUES: No paraspinal mass identified.  L1-L2: No significant disc herniation appreciated by CT.  Mild facet hypertrophy. No significant central canal or lateral recess stenosis. Mild neural foraminal stenoses. L2-L3: Minimal disc bulge. Mild facet hypertrophy. No significant central canal or lateral recess stenosis. Mild neural foraminal stenoses. L3-L4: Small disc bulge. Mild facet and ligamentous hypertrophy. No significant central canal stenosis. Mild lateral recess and neural foraminal stenoses. L4-L5: A disc bulge indents the ventral thecal sac. Due to a relatively capacious canal, no significant central canal stenosis is seen. Mild lateral recess stenoses. No significant neural foraminal stenosis. L5-S1: Prominent loss of disc height with a small disc bulge. No significant central canal or lateral recess stenosis. Mild to moderate neural foraminal stenoses. CT OF THE THORACIC SPINE: 1. No acute fracture. 2. Chronic compression fracture deformities in the lower thoracic spine, resulting in an exaggerated thoracic kyphosis. Multilevel kyphoplasty changes. 3. Markedly demineralized bones. CT OF THE LUMBAR SPINE: 1. Age-indeterminate, possibly acute or subacute compression fracture deformity of the L3 vertebral body with approximately 50% loss of height. 2. Chronic compression fracture deformities at L1, L2 and L4. 3. Mild progressive loss of height in the L1 vertebral body since the MRI from 08/02/2021.  RECOMMENDATIONS: Unavailable     MRI THORACIC SPINE WO CONTRAST    Result Date: 6/7/2022  EXAMINATION: MRI OF THE THORACIC SPINE WITHOUT AND WITH CONTRAST; MRI OF THE LUMBAR SPINE WITHOUT CONTRAST  6/7/2022 8:19 am TECHNIQUE: Multiplanar multisequence MRI of the thoracic spine was performed without and with the administration of intravenous contrast.; Multiplanar multisequence MRI of the lumbar spine was performed without the administration of intravenous contrast. COMPARISON: CT T and L-spine 06/06/2022 HISTORY: ORDERING SYSTEM PROVIDED HISTORY: compression fracture FINDINGS: THORACIC SPINE: BONES/ALIGNMENT: There is exaggerated kyphotic curvature of the lower thoracic spine. Remote compression fracture deformities and cement augmentation changes involving T11, T12, as well as T8 and T9.  6 mm retropulsion of the superior endplate of P68 resulting in mild-to-moderate canal narrowing. SPINAL CORD: No abnormal cord signal is seen. SOFT TISSUES: No paraspinal mass identified. DEGENERATIVE CHANGES: Mild-to-moderate canal narrowing at T11-12 related to retropulsion of the T12 superior endplate. No significant canal stenosis. Moderate bilateral foraminal stenosis at T12-L1. LUMBAR SPINE: BONES/ALIGNMENT: Acute to subacute compression fracture deformities of L1, L3 and L4. Moderate height loss involving L1 and 3, mild height loss involving L4. No significant retropulsion. Remote fracture deformity and cement augmentation changes involving L2. SPINAL CORD:  The conus terminates normally. SOFT TISSUES: No paraspinal mass identified. L1-L2: There is no significant disc protrusion, spinal canal stenosis or neural foraminal narrowing. L2-L3: There is no significant disc protrusion, spinal canal stenosis or neural foraminal narrowing. L3-L4: There is no significant disc protrusion, spinal canal stenosis or neural foraminal narrowing. L4-L5: There is no significant disc protrusion, spinal canal stenosis or neural foraminal narrowing. L5-S1: There is no significant disc protrusion, spinal canal stenosis or neural foraminal narrowing. 1. Acute to subacute compression fracture deformities of L1, L3 and L4. No significant retropulsion. 2. Multiple remote compression fracture deformities and cement augmentation changes throughout the thoracic spine.      MRI LUMBAR SPINE WO CONTRAST    Result Date: 6/7/2022  EXAMINATION: MRI OF THE THORACIC SPINE WITHOUT AND WITH CONTRAST; MRI OF THE LUMBAR SPINE WITHOUT CONTRAST  6/7/2022 8:19 am TECHNIQUE: Multiplanar multisequence MRI of the thoracic spine was performed without and with the administration of intravenous contrast.; Multiplanar multisequence MRI of the lumbar spine was performed without the administration of intravenous contrast. COMPARISON: CT T and L-spine 06/06/2022 HISTORY: ORDERING SYSTEM PROVIDED HISTORY: compression fracture FINDINGS: THORACIC SPINE: BONES/ALIGNMENT: There is exaggerated kyphotic curvature of the lower thoracic spine. Remote compression fracture deformities and cement augmentation changes involving T11, T12, as well as T8 and T9.  6 mm retropulsion of the superior endplate of W76 resulting in mild-to-moderate canal narrowing. SPINAL CORD: No abnormal cord signal is seen. SOFT TISSUES: No paraspinal mass identified. DEGENERATIVE CHANGES: Mild-to-moderate canal narrowing at T11-12 related to retropulsion of the T12 superior endplate. No significant canal stenosis. Moderate bilateral foraminal stenosis at T12-L1. LUMBAR SPINE: BONES/ALIGNMENT: Acute to subacute compression fracture deformities of L1, L3 and L4. Moderate height loss involving L1 and 3, mild height loss involving L4. No significant retropulsion. Remote fracture deformity and cement augmentation changes involving L2. SPINAL CORD:  The conus terminates normally. SOFT TISSUES: No paraspinal mass identified. L1-L2: There is no significant disc protrusion, spinal canal stenosis or neural foraminal narrowing. L2-L3: There is no significant disc protrusion, spinal canal stenosis or neural foraminal narrowing. L3-L4: There is no significant disc protrusion, spinal canal stenosis or neural foraminal narrowing. L4-L5: There is no significant disc protrusion, spinal canal stenosis or neural foraminal narrowing. L5-S1: There is no significant disc protrusion, spinal canal stenosis or neural foraminal narrowing. 1. Acute to subacute compression fracture deformities of L1, L3 and L4. No significant retropulsion.  2. Multiple remote compression fracture deformities and cement augmentation changes throughout the thoracic spine. FLUORO FOR SURGICAL PROCEDURES    Result Date: 2022  EXAMINATION: SPOT FLUOROSCOPIC IMAGES 2022 9:59 am TECHNIQUE: Fluoroscopy was provided by the radiology department for procedure. Radiologist was not present during examination. FLUOROSCOPY DOSE AND TYPE OR TIME AND EXPOSURES: 57 seconds fluoroscopy time (7.38 mGy) was used for this examination. COMPARISON: 2022 lumbar spine CT. HISTORY: ORDERING SYSTEM PROVIDED HISTORY:  lumbar multi-level kyphoplasty TECHNOLOGIST PROVIDED HISTORY: Reason for exam:-> lumbar multi-level kyphoplasty What reading provider will be dictating this exam?->CRC Intraprocedural imaging. FINDINGS: 5 spot images of the lumbar spine were obtained. Intraprocedural fluoroscopic spot images as above. See separate procedure report for more information. US DUP LOWER EXTREMITIES BILATERAL VENOUS    Result Date: 2022  Patient MRN:  [id number] : 1928 Age: 80 years Gender: Female Order Date:  2022 10:42 AM EXAM: US DUP LOWER EXTREMITIES BILATERAL VENOUS NUMBER OF IMAGES:  52 INDICATION:  EDEMA LEGS, AND SHE IS SEDENTARY EDEMA LEGS, AND SHE IS SEDENTARY What reading provider will be dictating this exam?->[facility] COMPARISON: None Within the visualized vessels, there is no evidence for deep venous thrombosis There is good compressibility, there is good augmentation, there is good color flow.      Within the visualized vessels there is no evidence for deep venous thrombosis       Discharge Medications:      Medication List      START taking these medications    albuterol (2.5 MG/3ML) 0.083% nebulizer solution  Commonly known as: PROVENTIL  Take 3 mLs by nebulization 4 times daily     guaiFENesin-dextromethorphan 100-10 MG/5ML syrup  Commonly known as: ROBITUSSIN DM  Take 5 mLs by mouth every 4 hours as needed for Cough        CONTINUE taking these medications    acetaminophen 500 MG tablet  Commonly known as: APAP Extra Strength  Take 2 tablets by mouth 2 times daily     carbidopa-levodopa  MG per tablet  Commonly known as: SINEMET     Digoxin 62.5 MCG Tabs  Take 62.5 mcg by mouth daily     doxycycline hyclate 100 mg capsule  Commonly known as: VIBRAMYCIN  Notes to patient: Stop after 9/16 AM dose     Eliquis 2.5 MG Tabs tablet  Generic drug: apixaban     furosemide 20 MG tablet  Commonly known as: LASIX  Take 1 tablet by mouth daily     metoprolol tartrate 25 MG tablet  Commonly known as: LOPRESSOR  Take 1 tablet by mouth 2 times daily     Soothe XP Soln     traMADol 50 MG tablet  Commonly known as: ULTRAM  Take 1 tablet by mouth every 6 hours as needed for Pain for up to 3 days. vitamin D3 25 MCG (1000 UT) Tabs tablet  Commonly known as: CHOLECALCIFEROL           Where to Get Your Medications      You can get these medications from any pharmacy    Bring a paper prescription for each of these medications  · traMADol 50 MG tablet     Information about where to get these medications is not yet available    Ask your nurse or doctor about these medications  · albuterol (2.5 MG/3ML) 0.083% nebulizer solution  · guaiFENesin-dextromethorphan 100-10 MG/5ML syrup         Time Spent on discharge is more than 45 minutes in the examination, evaluation, counseling and review of medications and discharge plan.    +++++++++++++++++++++++++++++++++++++++++++++++++  Charley Anderson DO  [address], [place]  +++++++++++++++++++++++++++++++++++++++++++++++++  NOTE: This report was transcribed using voice recognition software. Every effort was made to ensure accuracy; however, inadvertent computerized transcription errors may be present.

## 2022-06-13 NOTE — PROGRESS NOTES
Physician Progress Note      PATIENTCarelizabeth Gilbert  LYNNETTE #:                  [id number]  :                       1928  ADMIT DATE:       2022 11:12 AM  [address] DATE:        6/10/2022 8:30 PM  RESPONDING  PROVIDER #:        Brenda RIOS        QUERY TEXT:    Stage of Chronic Kidney Disease: Please provide further specificity, if known. Clinical indicators include: ckd, bun, creatinine  Options provided:  -- Chronic kidney disease stage 1  -- Chronic kidney disease stage 2  -- Chronic kidney disease stage 3  -- Chronic kidney disease stage 3a  -- Chronic kidney disease stage 3b  -- Chronic kidney disease stage 4  -- Chronic kidney disease stage 5  -- Chronic kidney disease stage 5, requiring dialysis  -- End stage renal disease  -- Other - I will add my own diagnosis  -- Disagree - Not applicable / Not valid  -- Disagree - Clinically Unable to determine / Unknown        PROVIDER RESPONSE TEXT:    The patient has chronic kidney disease stage 3a.       Electronically signed by:  John Kahn 2022 8:59 AM

## 2022-06-23 NOTE — PROGRESS NOTES
24 hour chart check complete.  Rojelio Desai RN Routine Visit Note: Patient was seated in chair and agreeable to PTA visit. She reports feeling strong, and being able to do more. Denies pain or falls.    Skill/education provided: Skilled instruction and cues for dynamic balance and lower extremity strength interventions on balance foam. Educated on pain management including ice and rest.    Patient/caregiver response: Patient became fatigued and complained of L ankle discomfort. Otherweise, patient did very well and tolerated progression.    Plan for next visit: Continue progressing standing balance, outdoor gait, and lower extremity strength.    Other pertinent info: n/a

## 2025-02-13 NOTE — PROGRESS NOTES
Hospitalist Progress Note      PCP: Jeferson Ann MD    Date of Admission: 6/6/2022        Hospital Course:  *80 y.o. female presented with BACK PAIN, SON SAYS MOM DID NOT FALL, HOWEVER SHE MOVED OR TURNED OVER IN BED AND FELT SEVERE BACK ALMENDAREZ, FOUND TO HAVE COMPRESSION FRACTURES L3 compression fx**        Subjective: * in OR          Medications:  Reviewed    Infusion Medications    sodium chloride       Scheduled Medications    docusate sodium  100 mg Oral Nightly    pantoprazole  40 mg Oral QAM AC    vancomycin  750 mg IntraVENous On Call to OR    albuterol  2.5 mg Nebulization 4x daily    polyvinyl alcohol  1 drop Left Eye TID    carbidopa-levodopa  1 tablet Oral BID    digoxin  62.5 mcg Oral Daily    furosemide  20 mg Oral Daily    metoprolol tartrate  25 mg Oral BID    sodium chloride flush  10 mL IntraVENous 2 times per day     PRN Meds: Lidocaine-EPINEPHrine, bupivacaine (PF), trimethobenzamide, traMADol, sodium chloride flush, sodium chloride, potassium chloride **OR** potassium alternative oral replacement **OR** potassium chloride, ondansetron **OR** ondansetron, magnesium hydroxide, acetaminophen **OR** acetaminophen      Intake/Output Summary (Last 24 hours) at 6/8/2022 1403  Last data filed at 6/8/2022 1343  Gross per 24 hour   Intake    Output 5 ml   Net -5 ml       Exam:    /66   Pulse 60   Temp 97.6 °F (36.4 °C) (Temporal)   Resp 16   Wt 88 lb (39.9 kg)   SpO2 94%   BMI 16.63 kg/m²      in OR        Labs:   Recent Labs     06/06/22  1228 06/07/22  0538   WBC 9.9 7.0   HGB 9.9* 9.4*   HCT 31.0* 30.0*    198     Recent Labs     06/06/22  1228 06/07/22  0538 06/08/22  0618    138 141   K 4.5 3.8 3.7    100 101   CO2 27 24 26   BUN 28* 23 20   CREATININE 1.0 0.9 0.8   CALCIUM 9.2 9.2 9.1     No results for input(s): AST, ALT, BILIDIR, BILITOT, ALKPHOS in the last 72 hours.   Recent Labs     06/07/22  1628   INR 1.2     No results for input(s): CKTOTAL, Pt's son Lucas crain and he states he knows he's not on HIPAA but pt was beside him also talking. Her other son Delvis that usually helps is in the hospital at time so that's why (Donna)  is over at the house helping pt sort out all of her medications. He was asking for a sooner appt but I did not see a sooner appt avail. He states for instance he went to pharmacy and they told him pt is on too many medications and pt is having a hard time remembering to take some of them. He states he did not see any test strips at her house.     # [phone] - son / Donna  # [phone]   TROPONINI in the last 72 hours. No results for input(s): AST, ALT, ALB, BILIDIR, BILITOT, ALKPHOS in the last 72 hours. No results for input(s): LACTA in the last 72 hours. No results found for: Dorthey Harms  No results found for: AMMONIA    Assessment:    Active Hospital Problems    Diagnosis Date Noted    Compression [facility] third lumbar vertebra [facility] [S32.030A]      Priority: Medium    Lumbar compression fracture, closed, initial encounter [facility] [S32.000A] 08/07/2019   MOIST COUGH  EDEMA OF LEGS   PARKINSONS   HTN   PAF        PLAN:  CXR  NEUR SURGERY CONSULT  SINEMET  LOPRESSOR DIG   US OF LEGS PENDING        DVT Prophylaxis: SCD  Diet: ADULT DIET; Regular;  Low Sodium (2 gm)  Diet NPO  Code Status: DNR-CCA     PT/OT Eval Status: *ORDERED     Dispo - *SNF        Electronically signed by Theo Aguilar DO on 6/8/2022 at 2:03 PM [facility]

## (undated) DEVICE — INTENDED FOR TISSUE SEPARATION, AND OTHER PROCEDURES THAT REQUIRE A SHARP SURGICAL BLADE TO PUNCTURE OR CUT.: Brand: BARD-PARKER ® STAINLESS STEEL BLADES

## (undated) DEVICE — DRAPE,REIN 53X77,STERILE: Brand: MEDLINE

## (undated) DEVICE — SWAB SPEC COLL SHFT L5.25IN POLYUR FOAM TIP SFT DBL MEDIA

## (undated) DEVICE — PACK,LAPAROTOMY,NO GOWNS: Brand: MEDLINE

## (undated) DEVICE — TRAP,MUCUS SPECIMEN,40CC: Brand: MEDLINE

## (undated) DEVICE — 3M™ IOBAN™ 2 ANTIMICROBIAL INCISE DRAPE 6650EZ: Brand: IOBAN™ 2

## (undated) DEVICE — GOWN,SIRUS,FABRNF,L,20/CS: Brand: MEDLINE

## (undated) DEVICE — ARM DRAPE

## (undated) DEVICE — MARKER,SKIN,WI/RULER AND LABELS: Brand: MEDLINE

## (undated) DEVICE — ADHESIVE SKIN CLOSURE TOP 36 CC HI VISC DERMBND MINI

## (undated) DEVICE — TOWEL,OR,DSP,ST,BLUE,DLX,10/PK,8PK/CS: Brand: MEDLINE

## (undated) DEVICE — GAUZE,SPONGE,4"X4",16PLY,XRAY,STRL,LF: Brand: MEDLINE

## (undated) DEVICE — COUNTER NDL 30 COUNT DBL MAG

## (undated) DEVICE — DRAPE C ARM UNIV W41XL74IN CLR PLAS XR VELC CLSR POLY STRP

## (undated) DEVICE — SYRINGE A08A KYPHX XPANDER INFLATION: Brand: KYPHON®  INFLATION SYRINGE

## (undated) DEVICE — SKIN AFFIX SURG ADHESIVE 72/CS 0.55ML: Brand: MEDLINE

## (undated) DEVICE — CLOTH SURG PREP PREOPERATIVE CHLORHEXIDINE GLUC 2% READYPREP

## (undated) DEVICE — SOLUTION IV IRRIG POUR BRL 0.9% SODIUM CHL 2F7124

## (undated) DEVICE — LABEL MED 4 IN SURG PANEL W/ PEN STRL

## (undated) DEVICE — GLOVE ORANGE PI 8   MSG9080

## (undated) DEVICE — INTRODUCER T15K ONE STEP OID BEVEL: Brand: KYPHON® ONE-STEP™ OSTEO INTRODUCER™ SYSTEM

## (undated) DEVICE — SYRINGE 20ML LL S/C 50

## (undated) DEVICE — CONTROL SYRINGE LUER-LOCK TIP: Brand: MONOJECT

## (undated) DEVICE — SPONGE LAP W18XL18IN WHT COT 4 PLY FLD STRUNG RADPQ DISP ST

## (undated) DEVICE — CONTAINER VACUTAINER ANAER CULTURE SWAB

## (undated) DEVICE — GOWN,SIRUS,FABRNF,2XL,18/CS: Brand: MEDLINE

## (undated) DEVICE — JACKSON TABLE POSITIONER KIT: Brand: MEDLINE INDUSTRIES, INC.

## (undated) DEVICE — BONE BIOPSY DEVICE F05A BBD SIZE 3: Brand: MEDTRONIC REUSABLE INSTRUMENTS

## (undated) DEVICE — SHEET, T, LAPAROTOMY, STERILE: Brand: MEDLINE

## (undated) DEVICE — GLOVE SURG 8.5 PF POLYMER WHT STRL SIGN LTX ESSENTIAL LTX

## (undated) DEVICE — BONE FILLER DEVICE F04B SIZE 3

## (undated) DEVICE — 4-PORT MANIFOLD: Brand: NEPTUNE 2

## (undated) DEVICE — KIT,ANTI FOG,W/SPONGE & FLUID,SOFT PACK: Brand: MEDLINE

## (undated) DEVICE — HYPODERMIC SAFETY NEEDLE: Brand: MAGELLAN

## (undated) DEVICE — GLOVE SURG SZ 85 STD WHT LTX SYN POLYMER BEAD REINF ANTI RL

## (undated) DEVICE — APPLICATOR PREP 26ML 0.7% IOD POVACRYLEX 74% ISO ALC ST

## (undated) DEVICE — SPONGE,DISSECTOR,ROUND CHERRY,XR,ST,5/PK: Brand: MEDLINE

## (undated) DEVICE — TOWEL,OR,DSP,ST,BLUE,STD,6/PK,12PK/CS: Brand: MEDLINE

## (undated) DEVICE — CHLORAPREP 26ML ORANGE

## (undated) DEVICE — PACK PROCEDURE SURG GEN CUST

## (undated) DEVICE — GOWN,SIRUS,FABRNF,XL,20/CS: Brand: MEDLINE

## (undated) DEVICE — BLADELESS OBTURATOR: Brand: WECK VISTA

## (undated) DEVICE — DRAPE THER FLUID WARMING 66X44 IN FLAT SLUSH DBL DISC ORS

## (undated) DEVICE — BAG SPECIMEN BIOHAZARD 6IN X 9IN

## (undated) DEVICE — STANDARD HYPODERMIC NEEDLE,POLYPROPYLENE HUB: Brand: MONOJECT

## (undated) DEVICE — PERMANENT CAUTERY HOOK: Brand: ENDOWRIST

## (undated) DEVICE — ROUND TIP SCISSORS: Brand: ENDOWRIST

## (undated) DEVICE — DOUBLE BASIN SET: Brand: MEDLINE INDUSTRIES, INC.

## (undated) DEVICE — CANNULA SEAL

## (undated) DEVICE — INSUFFLATION TUBING SET WITH FILTER, FUNNEL CONNECTOR AND LUER LOCK: Brand: JOSNOE MEDICAL INC

## (undated) DEVICE — INSTRUMENT CLAMP TOWEL LARGE REUSABLE

## (undated) DEVICE — BLADE CLIPPER GEN PURP NS

## (undated) DEVICE — INSUFFLATION NEEDLE TO ESTABLISH PNEUMOPERITONEUM.: Brand: INSUFFLATION NEEDLE

## (undated) DEVICE — SOCK SPEC L9IN WHT UNIV W/ STD PRT FOR FLD MGMT

## (undated) DEVICE — TAMP K08A XPAN INFLAT BONE  SIZE 20/3-RB: Brand: KYPHON XPANDER™ INFLATABLE BONE TAMP

## (undated) DEVICE — SUTURE VCRL SZ 1 L27IN ABSRB VLT L70MM XLH 1/2 CIR J583G

## (undated) DEVICE — DRESSING FOAM W6.3XL7.9IN TAN SACR SELF ADH MEPILEX BORD

## (undated) DEVICE — IBT KIT KPX153PB-A FF X2 15/3 1 STP: Brand: KYPHON® ONE-STEP™ SYSTEM, TROCAR & DIAMOND AND BFD

## (undated) DEVICE — DRAPE 24X23IN RADIOLOGY CASS ADHES STRIP

## (undated) DEVICE — SYRINGE MED 10ML LUERLOCK TIP W/O SFTY DISP

## (undated) DEVICE — PATIENT RETURN ELECTRODE, SINGLE-USE, CONTACT QUALITY MONITORING, ADULT, WITH 9FT CORD, FOR PATIENTS WEIGING OVER 33LBS. (15KG): Brand: MEGADYNE

## (undated) DEVICE — C-ARM: Brand: UNBRANDED

## (undated) DEVICE — Z DISCONTINUED NO SUB IDED DRAIN PENROSE L12IN 0.25IN USED TO PROMOTE DRNAGE IN OPN

## (undated) DEVICE — CLIP INT M L POLYMER LOK LIG HEM O LOK

## (undated) DEVICE — PROGRASP FORCEPS: Brand: ENDOWRIST

## (undated) DEVICE — ADHESIVE SKIN CLSR 0.7ML TOP DERMBND ADV

## (undated) DEVICE — WARMER SCP 2 ANTIFOG LAP DISP

## (undated) DEVICE — MEDIA CONTRAST RX ISOVUE-300 61% 30ML VIALS

## (undated) DEVICE — MEDIUM-LARGE CLIP APPLIER: Brand: ENDOWRIST

## (undated) DEVICE — ELECTRODE PT RET AD L9FT HI MOIST COND ADH HYDRGEL CORDED

## (undated) DEVICE — ANCHOR TISSUE RETRIEVAL SYSTEM, BAG SIZE 125 ML, PORT SIZE 8 MM: Brand: ANCHOR TISSUE RETRIEVAL SYSTEM

## (undated) DEVICE — COLUMN DRAPE

## (undated) DEVICE — DRAPE,LAP,CHOLE,W/TROUGHS,STERILE: Brand: MEDLINE